# Patient Record
Sex: MALE | Race: WHITE | Employment: FULL TIME | URBAN - METROPOLITAN AREA
[De-identification: names, ages, dates, MRNs, and addresses within clinical notes are randomized per-mention and may not be internally consistent; named-entity substitution may affect disease eponyms.]

---

## 2017-01-04 ENCOUNTER — CARE COORDINATION (OUTPATIENT)
Dept: INTERNAL MEDICINE | Age: 72
End: 2017-01-04

## 2017-02-03 RX ORDER — RAMIPRIL 5 MG/1
CAPSULE ORAL
Qty: 90 CAPSULE | Refills: 3 | Status: SHIPPED | OUTPATIENT
Start: 2017-02-03 | End: 2017-12-12 | Stop reason: SDUPTHER

## 2017-02-03 RX ORDER — ATORVASTATIN CALCIUM 80 MG/1
TABLET, FILM COATED ORAL
Qty: 90 TABLET | Refills: 3 | Status: SHIPPED | OUTPATIENT
Start: 2017-02-03 | End: 2017-12-12 | Stop reason: SDUPTHER

## 2017-02-28 ENCOUNTER — CARE COORDINATION (OUTPATIENT)
Dept: INTERNAL MEDICINE | Age: 72
End: 2017-02-28

## 2017-04-05 ENCOUNTER — CARE COORDINATION (OUTPATIENT)
Dept: INTERNAL MEDICINE | Age: 72
End: 2017-04-05

## 2017-05-30 ENCOUNTER — CARE COORDINATION (OUTPATIENT)
Dept: INTERNAL MEDICINE | Age: 72
End: 2017-05-30

## 2017-06-12 ENCOUNTER — OFFICE VISIT (OUTPATIENT)
Dept: INTERNAL MEDICINE | Age: 72
End: 2017-06-12

## 2017-06-12 VITALS
DIASTOLIC BLOOD PRESSURE: 70 MMHG | SYSTOLIC BLOOD PRESSURE: 130 MMHG | WEIGHT: 201 LBS | BODY MASS INDEX: 26.64 KG/M2 | HEIGHT: 73 IN

## 2017-06-12 DIAGNOSIS — K63.5 COLON POLYPS: ICD-10-CM

## 2017-06-12 DIAGNOSIS — Z12.5 PROSTATE CANCER SCREENING: ICD-10-CM

## 2017-06-12 DIAGNOSIS — I25.10 CORONARY ARTERY DISEASE INVOLVING NATIVE CORONARY ARTERY OF NATIVE HEART WITHOUT ANGINA PECTORIS: ICD-10-CM

## 2017-06-12 DIAGNOSIS — I10 ESSENTIAL HYPERTENSION, BENIGN: Primary | ICD-10-CM

## 2017-06-12 DIAGNOSIS — L57.0 ACTINIC KERATOSIS: ICD-10-CM

## 2017-06-12 DIAGNOSIS — E78.2 MIXED HYPERLIPIDEMIA: ICD-10-CM

## 2017-06-12 DIAGNOSIS — R73.9 HYPERGLYCEMIA: ICD-10-CM

## 2017-06-12 PROCEDURE — 99214 OFFICE O/P EST MOD 30 MIN: CPT | Performed by: HOSPITALIST

## 2017-06-14 LAB
A/G RATIO: 1.6 (ref 1.1–2.2)
ALBUMIN SERPL-MCNC: 4.2 G/DL (ref 3.4–5)
ALP BLD-CCNC: 102 U/L (ref 40–129)
ALT SERPL-CCNC: 29 U/L (ref 10–40)
ANION GAP SERPL CALCULATED.3IONS-SCNC: 10 MMOL/L (ref 3–16)
AST SERPL-CCNC: 20 U/L (ref 15–37)
BASOPHILS ABSOLUTE: 0 K/UL (ref 0–0.2)
BASOPHILS RELATIVE PERCENT: 0.4 %
BILIRUB SERPL-MCNC: 0.5 MG/DL (ref 0–1)
BUN BLDV-MCNC: 15 MG/DL (ref 7–20)
CALCIUM SERPL-MCNC: 9.7 MG/DL (ref 8.3–10.6)
CHLORIDE BLD-SCNC: 99 MMOL/L (ref 99–110)
CHOLESTEROL, TOTAL: 217 MG/DL (ref 0–199)
CO2: 30 MMOL/L (ref 21–32)
CREAT SERPL-MCNC: 0.7 MG/DL (ref 0.8–1.3)
EOSINOPHILS ABSOLUTE: 0 K/UL (ref 0–0.6)
EOSINOPHILS RELATIVE PERCENT: 0.1 %
GFR AFRICAN AMERICAN: >60
GFR NON-AFRICAN AMERICAN: >60
GLOBULIN: 2.7 G/DL
GLUCOSE BLD-MCNC: 165 MG/DL (ref 70–99)
HCT VFR BLD CALC: 43.1 % (ref 40.5–52.5)
HDLC SERPL-MCNC: 46 MG/DL (ref 40–60)
HEMOGLOBIN: 14 G/DL (ref 13.5–17.5)
LDL CHOLESTEROL CALCULATED: ABNORMAL MG/DL
LDL CHOLESTEROL DIRECT: 111 MG/DL
LYMPHOCYTES ABSOLUTE: 2.2 K/UL (ref 1–5.1)
LYMPHOCYTES RELATIVE PERCENT: 34.7 %
MCH RBC QN AUTO: 30.2 PG (ref 26–34)
MCHC RBC AUTO-ENTMCNC: 32.4 G/DL (ref 31–36)
MCV RBC AUTO: 93.1 FL (ref 80–100)
MONOCYTES ABSOLUTE: 0.5 K/UL (ref 0–1.3)
MONOCYTES RELATIVE PERCENT: 7.1 %
NEUTROPHILS ABSOLUTE: 3.7 K/UL (ref 1.7–7.7)
NEUTROPHILS RELATIVE PERCENT: 57.7 %
PDW BLD-RTO: 14.1 % (ref 12.4–15.4)
PLATELET # BLD: 273 K/UL (ref 135–450)
PMV BLD AUTO: 9.2 FL (ref 5–10.5)
POTASSIUM SERPL-SCNC: 5.5 MMOL/L (ref 3.5–5.1)
PROSTATE SPECIFIC ANTIGEN: 0.65 NG/ML (ref 0–4)
RBC # BLD: 4.63 M/UL (ref 4.2–5.9)
SODIUM BLD-SCNC: 139 MMOL/L (ref 136–145)
TOTAL PROTEIN: 6.9 G/DL (ref 6.4–8.2)
TRIGL SERPL-MCNC: 377 MG/DL (ref 0–150)
VLDLC SERPL CALC-MCNC: ABNORMAL MG/DL
WBC # BLD: 6.3 K/UL (ref 4–11)

## 2017-06-15 LAB
ESTIMATED AVERAGE GLUCOSE: 191.5 MG/DL
HBA1C MFR BLD: 8.3 %

## 2017-06-18 ENCOUNTER — TELEPHONE (OUTPATIENT)
Dept: INTERNAL MEDICINE | Age: 72
End: 2017-06-18

## 2017-06-18 DIAGNOSIS — E11.9 TYPE 2 DIABETES MELLITUS WITHOUT COMPLICATION, WITHOUT LONG-TERM CURRENT USE OF INSULIN (HCC): Primary | ICD-10-CM

## 2017-06-19 ENCOUNTER — CARE COORDINATION (OUTPATIENT)
Dept: INTERNAL MEDICINE | Age: 72
End: 2017-06-19

## 2017-06-26 ENCOUNTER — CARE COORDINATION (OUTPATIENT)
Dept: INTERNAL MEDICINE | Age: 72
End: 2017-06-26

## 2017-06-28 ENCOUNTER — OFFICE VISIT (OUTPATIENT)
Dept: CARDIOLOGY CLINIC | Age: 72
End: 2017-06-28

## 2017-06-28 VITALS
HEART RATE: 84 BPM | WEIGHT: 195.4 LBS | BODY MASS INDEX: 25.78 KG/M2 | DIASTOLIC BLOOD PRESSURE: 60 MMHG | SYSTOLIC BLOOD PRESSURE: 140 MMHG

## 2017-06-28 DIAGNOSIS — I25.10 CAD IN NATIVE ARTERY: Primary | ICD-10-CM

## 2017-06-28 DIAGNOSIS — I25.2 MI, OLD: ICD-10-CM

## 2017-06-28 DIAGNOSIS — I10 ESSENTIAL HYPERTENSION: ICD-10-CM

## 2017-06-28 PROCEDURE — 99214 OFFICE O/P EST MOD 30 MIN: CPT | Performed by: INTERNAL MEDICINE

## 2017-07-20 ENCOUNTER — CARE COORDINATION (OUTPATIENT)
Dept: INTERNAL MEDICINE | Age: 72
End: 2017-07-20

## 2017-07-20 ENCOUNTER — CARE COORDINATOR VISIT (OUTPATIENT)
Dept: CARE COORDINATION | Age: 72
End: 2017-07-20

## 2017-08-21 ENCOUNTER — CARE COORDINATION (OUTPATIENT)
Dept: INTERNAL MEDICINE | Age: 72
End: 2017-08-21

## 2017-09-20 ENCOUNTER — OFFICE VISIT (OUTPATIENT)
Dept: DERMATOLOGY | Age: 72
End: 2017-09-20

## 2017-09-20 DIAGNOSIS — L82.1 SK (SEBORRHEIC KERATOSIS): Primary | ICD-10-CM

## 2017-09-20 DIAGNOSIS — Z86.006 HISTORY OF MELANOMA IN SITU: ICD-10-CM

## 2017-09-20 DIAGNOSIS — L57.0 AK (ACTINIC KERATOSIS): ICD-10-CM

## 2017-09-20 PROCEDURE — 99213 OFFICE O/P EST LOW 20 MIN: CPT | Performed by: DERMATOLOGY

## 2017-09-29 ENCOUNTER — CARE COORDINATION (OUTPATIENT)
Dept: INTERNAL MEDICINE | Age: 72
End: 2017-09-29

## 2017-10-06 NOTE — PATIENT INSTRUCTIONS
Anxiety Disorder: Care Instructions  Your Care Instructions  Anxiety is a normal reaction to stress. Difficult situations can cause you to have symptoms such as sweaty palms and a nervous feeling. In an anxiety disorder, the symptoms are far more severe. Constant worry, muscle tension, trouble sleeping, nausea and diarrhea, and other symptoms can make normal daily activities difficult or impossible. These symptoms may occur for no reason, and they can affect your work, school, or social life. Medicines, counseling, and self-care can all help. Follow-up care is a key part of your treatment and safety. Be sure to make and go to all appointments, and call your doctor if you are having problems. It's also a good idea to know your test results and keep a list of the medicines you take. How can you care for yourself at home? · Take medicines exactly as directed. Call your doctor if you think you are having a problem with your medicine. · Go to your counseling sessions and follow-up appointments. · Recognize and accept your anxiety. Then, when you are in a situation that makes you anxious, say to yourself, \"This is not an emergency. I feel uncomfortable, but I am not in danger. I can keep going even if I feel anxious. \"  · Be kind to your body:  ¨ Relieve tension with exercise or a massage. ¨ Get enough rest.  ¨ Avoid alcohol, caffeine, nicotine, and illegal drugs. They can increase your anxiety level and cause sleep problems. ¨ Learn and do relaxation techniques. See below for more about these techniques. · Engage your mind. Get out and do something you enjoy. Go to a funny movie, or take a walk or hike. Plan your day. Having too much or too little to do can make you anxious. · Keep a record of your symptoms. Discuss your fears with a good friend or family member, or join a support group for people with similar problems. Talking to others sometimes relieves stress.   · Get involved in social groups, or tape while you drive a car. When should you call for help? Call 911 anytime you think you may need emergency care. For example, call if:  · You feel you cannot stop from hurting yourself or someone else. Keep the numbers for these national suicide hotlines: 5-850-450-TALK (3-520.687.1339) and 4-425-BDXGONZ (0-115.639.1413). If you or someone you know talks about suicide or feeling hopeless, get help right away. Watch closely for changes in your health, and be sure to contact your doctor if:  · You have anxiety or fear that affects your life. · You have symptoms of anxiety that are new or different from those you had before. Where can you learn more? Go to https://[email or web address].ChosenList.com. org and sign in to your [employer] account. Enter P754 in the EDITD box to learn more about \"Anxiety Disorder: Care Instructions. \"     If you do not have an account, please click on the \"Sign Up Now\" link. Current as of: July 26, 2016  Content Version: 11.3  © 2409-1578 [employer]. Care instructions adapted under license by [facility] ([facility]). If you have questions about a medical condition or this instruction, always ask your healthcare professional. [address] any warranty or liability for your use of this information.

## 2017-10-06 NOTE — CARE COORDINATION
be more proactive with preventive check ups  Patient is able to discuss self-management of condition(s):  Pt demonstrates adherence to medications  Pt demonstrates understanding of self-monitoring  Patient is able to identify Red Flags:  Alert to potential adverse drug reactions(s) or side effects and actions to take should they arise  Discuss target symptoms and actions to take should they arise  Identify problems that require immediate PCP or specialist visit  Patient demonstrates understanding of access to PCP/Specialist:  Understands about scheduling routine Follow Up appointments   Understands about sick day appointment options for worsening of symptoms/progression (Same Day, E Visits)       Exercise 3x per week (30 min per time)   On track (9/29/2017)             Care Coordination Goal:  Exercise  Goal: Type: walking     Barriers:  lack of motivation  Plan for overcoming my barriers: Pt stated he has just started walking. Confidence: 7/10       Nutrition Plan   On track (9/29/2017)             I will learn more about a Diabetic Diet and carb counting. Barriers: stress and lack of education  Plan for overcoming my barriers: Diabeitc Ed with the [address] and Dietitian. Confidence: 8/10  Anticipated Goal Completion Date: 09/19/17            Prior to Admission medications    Medication Sig Start Date End Date Taking? Authorizing Provider   glucose blood VI test strips (ASCENSIA AUTODISC VI;ONE TOUCH ULTRA TEST VI) strip 1 each by In Vitro route daily As needed.  7/26/17   Adrianna Alexandre MD   metFORMIN (GLUCOPHAGE) 500 MG tablet Take 1 tablet by mouth 2 times daily (with meals) 6/18/17   Adrianna Alexandre MD   atorvastatin (LIPITOR) 80 MG tablet TAKE 1 TABLET DAILY 2/3/17   Margarito Pettit MD   metoprolol tartrate (LOPRESSOR) 25 MG tablet TAKE ONE-HALF TABLET BY MOUTH TWICE DAILY 2/3/17   Margarito Pettit MD   ramipril (ALTACE) 5 MG capsule TAKE 1 CAPSULE BY MOUTH DAILY 2/3/17   Margarito Pettit MD   nitroGLYCERIN

## 2017-10-14 DIAGNOSIS — E11.9 TYPE 2 DIABETES MELLITUS WITHOUT COMPLICATION, WITHOUT LONG-TERM CURRENT USE OF INSULIN (HCC): ICD-10-CM

## 2017-11-22 ENCOUNTER — CARE COORDINATION (OUTPATIENT)
Dept: CARE COORDINATION | Age: 72
End: 2017-11-22

## 2017-12-01 NOTE — CARE COORDINATION
Phone Note:  The pt's wife stated the pt was out of town. The wife stated she would have the pt call the nurse back next week. RNCC called to check on the pt's BS's.
(What steps will patient take to achieve goal?): Pt will be more proactive with preventive check ups  Patient is able to discuss self-management of condition(s):  Pt demonstrates adherence to medications  Pt demonstrates understanding of self-monitoring  Patient is able to identify Red Flags:  Alert to potential adverse drug reactions(s) or side effects and actions to take should they arise  Discuss target symptoms and actions to take should they arise  Identify problems that require immediate PCP or specialist visit  Patient demonstrates understanding of access to PCP/Specialist:  Understands about scheduling routine Follow Up appointments   Understands about sick day appointment options for worsening of symptoms/progression (Same Day, E Visits)       Exercise 3x per week (30 min per time)   On track (11/22/2017)             Care Coordination Goal:  Exercise  Goal: Type: walking     Barriers:  lack of motivation  Plan for overcoming my barriers: Pt stated he has just started walking. Confidence: 7/10       Nutrition Plan   On track (11/22/2017)             I will learn more about a Diabetic Diet and carb counting. Barriers: stress and lack of education  Plan for overcoming my barriers: Diabeitc Ed with the [facility] and Dietitian. Confidence: 8/10  Anticipated Goal Completion Date: 09/19/17            Prior to Admission medications    Medication Sig Start Date End Date Taking? Authorizing Provider   metFORMIN (GLUCOPHAGE) 500 MG tablet TAKE 1 TABLET BY MOUTH 2 TIMES DAILY (WITH MEALS) 10/16/17   Mirza Campuzano MD   glucose blood VI test strips (ASCENSIA AUTODISC VI;ONE TOUCH ULTRA TEST VI) strip 1 each by In Vitro route daily As needed.  7/26/17   Mirza Campuzano MD   atorvastatin (LIPITOR) 80 MG tablet TAKE 1 TABLET DAILY 2/3/17   Edson Urbano MD   metoprolol tartrate (LOPRESSOR) 25 MG tablet TAKE ONE-HALF TABLET BY MOUTH TWICE DAILY 2/3/17   Edson Urbano MD   ramipril (ALTACE) 5 MG capsule TAKE 1 CAPSULE BY

## 2017-12-14 RX ORDER — RAMIPRIL 5 MG/1
CAPSULE ORAL
Qty: 90 CAPSULE | Refills: 3 | Status: SHIPPED | OUTPATIENT
Start: 2017-12-14 | End: 2019-01-03 | Stop reason: SDUPTHER

## 2017-12-14 RX ORDER — ATORVASTATIN CALCIUM 80 MG/1
TABLET, FILM COATED ORAL
Qty: 90 TABLET | Refills: 3 | Status: SHIPPED | OUTPATIENT
Start: 2017-12-14 | End: 2019-01-03 | Stop reason: SDUPTHER

## 2017-12-20 ENCOUNTER — OFFICE VISIT (OUTPATIENT)
Dept: CARDIOLOGY CLINIC | Age: 72
End: 2017-12-20

## 2017-12-20 VITALS
HEART RATE: 72 BPM | DIASTOLIC BLOOD PRESSURE: 70 MMHG | SYSTOLIC BLOOD PRESSURE: 150 MMHG | BODY MASS INDEX: 25.07 KG/M2 | WEIGHT: 190 LBS

## 2017-12-20 DIAGNOSIS — I10 ESSENTIAL HYPERTENSION: ICD-10-CM

## 2017-12-20 DIAGNOSIS — I25.10 CAD IN NATIVE ARTERY: Primary | ICD-10-CM

## 2017-12-20 DIAGNOSIS — I25.2 MI, OLD: ICD-10-CM

## 2017-12-20 PROCEDURE — G8484 FLU IMMUNIZE NO ADMIN: HCPCS | Performed by: INTERNAL MEDICINE

## 2017-12-20 PROCEDURE — 3017F COLORECTAL CA SCREEN DOC REV: CPT | Performed by: INTERNAL MEDICINE

## 2017-12-20 PROCEDURE — 1123F ACP DISCUSS/DSCN MKR DOCD: CPT | Performed by: INTERNAL MEDICINE

## 2017-12-20 PROCEDURE — 4040F PNEUMOC VAC/ADMIN/RCVD: CPT | Performed by: INTERNAL MEDICINE

## 2017-12-20 PROCEDURE — 99214 OFFICE O/P EST MOD 30 MIN: CPT | Performed by: INTERNAL MEDICINE

## 2017-12-20 PROCEDURE — 1036F TOBACCO NON-USER: CPT | Performed by: INTERNAL MEDICINE

## 2017-12-20 PROCEDURE — G8417 CALC BMI ABV UP PARAM F/U: HCPCS | Performed by: INTERNAL MEDICINE

## 2017-12-20 PROCEDURE — G8598 ASA/ANTIPLAT THER USED: HCPCS | Performed by: INTERNAL MEDICINE

## 2017-12-20 PROCEDURE — G8427 DOCREV CUR MEDS BY ELIG CLIN: HCPCS | Performed by: INTERNAL MEDICINE

## 2017-12-20 NOTE — PROGRESS NOTES
Subjective:      Patient ID: Corky Rogers is a 67 y.o. male. HPI Renato Mitchell is here today for 6 month follow up CAD/HTN/MI. Continues to walk 3mi for 3-4x per wk. No angina. No sob. No pnd or orthopnea. No edema. Wt down. Feeling good. Past Medical History:   Diagnosis Date    CAD (coronary artery disease)     Hyperlipidemia     Hypertension     Keratosis, seborrheic     Lower GI bleed     Myocardial infarction     Sensorineural hearing loss of right ear     Shingles      Past Surgical History:   Procedure Laterality Date    CORONARY ANGIOPLASTY WITH STENT PLACEMENT      UPPER GASTROINTESTINAL ENDOSCOPY      received clipping for ania rice tear       Social History     Social History    Marital status:      Spouse name: N/A    Number of children: 2    Years of education: N/A     Occupational History          retired     Social History Main Topics    Smoking status: Never Smoker    Smokeless tobacco: Former User    Alcohol use No    Drug use: No    Sexual activity: Not on file     Other Topics Concern    Not on file     Social History Narrative    No narrative on file     FH reviewed    Review of Systems   Constitutional: Negative for activity change and fatigue. Respiratory: Negative for apnea, cough, choking, chest tightness and shortness of breath. Cardiovascular: Negative for chest pain, palpitations and leg swelling. No PND or orthopnea. No tachycardia. Gastrointestinal: Negative for abdominal distention. Musculoskeletal: Negative for myalgias. Neurological: Negative for dizziness and syncope. Psychiatric/Behavioral: Negative for behavioral problems, confusion and agitation. Other systems reviewed negative as done      Vitals:    12/20/17 1415   BP: (!) 150/70   Pulse:          Objective:   Physical Exam   Constitutional: He is oriented to person, place, and time. He appears well-developed and well-nourished. No distress.    HENT: Head: Normocephalic and atraumatic. Eyes: Conjunctivae and EOM are normal. Right eye exhibits no discharge. Left eye exhibits no discharge. Neck: Normal range of motion. Neck supple. No JVD present. Cardiovascular: Normal rate, regular rhythm, S1 normal and S2 normal.  Exam reveals no gallop. No murmur heard. Pulses:       Radial pulses are 2+ on the right side, and 2+ on the left side. Pulmonary/Chest: Breath sounds normal. No respiratory distress. He has no wheezes. He has no rales. Abdominal: Soft. Bowel sounds are normal. No tenderness. Musculoskeletal: Normal range of motion. He exhibits no edema. Neurological: He is alert and oriented to person, place, and time. Skin: Skin is warm and dry. Psychiatric: He has a normal mood and affect. His behavior is normal. Thought content normal.       Assessment:      1. CAD in native artery     2. Essential hypertension     3. MI, old               Plan:      CV stable. No chest pain. Continues to exercise. BP is good at home 110-130/70s. Follows bp at home. Reviewed previous cardiac records and testing including myoview 11/14. Continue to monitor. Follow up 6 months. Lipids per PCP. Stress myoview recommended but wishes to put off again.

## 2017-12-27 ENCOUNTER — CARE COORDINATION (OUTPATIENT)
Dept: CARE COORDINATION | Age: 72
End: 2017-12-27

## 2018-01-29 ENCOUNTER — CARE COORDINATION (OUTPATIENT)
Dept: CARE COORDINATION | Age: 73
End: 2018-01-29

## 2018-01-30 NOTE — CARE COORDINATION
Ambulatory Care Coordination Note  1/29/2018  CM Risk Score: 2  Precious Mortality Risk Score: 3.69    ACC: Madeline Breaux RN     Hx: Hyperlipidemia, HTN, CAD, MI, Resp Failure, GI Bleed with Hemorrhagic Shock, Hyperglycemia       Summary Note: The pt stated he has been feeling good. The pt denied any chest pain, SOB or palpitations. The pt stated he walks 2.5 miles daily. The pt stated he did not get a Flu vaccine this year. The pt stated the last time he got a Flu vaccine he felt bad for weeks. The pt stated he had fatigue, dizziness and palpitations. The [facility] discuss the experience further with the pt and the pt remember the fatigue, dizzines and palpitations started before he had his GI bleed. The pt stated he realizes now the GI bleed had caused his symptoms. The pt stated he was taken off Plavix and his Aspirin was decreased to 81 mg at that time. Plan:  The [facility] encouraged the pt to consider a Flu vaccine. The pt stated he will be sure to get a Flu vaccine for next year. RNCC reviewed the signs and symptoms of Flu, when to report the symptoms and the treatment. Handout mailed to the pt: Influenza (Flu): Care Instructions      Care Coordination Interventions    Program Enrollment:  Rising Risk  Referral from Primary Care Provider:  Yes  Suggested Interventions and Community Resources  Diabetes Education: In Process  Fall Risk Prevention: In Process  Life Skills Coaching: In Process  Zone Management Tools:   In Process  Other Services or Interventions:  Influenza education         Goals Addressed             Most Recent     Care Coordination Self Management   On track (1/29/2018)             CC Self Management Goal: Monitor and Maintain his own health  Patient Goal (What steps will patient take to achieve goal?): Pt will be more proactive with preventive check ups  Patient is able to discuss self-management of condition(s):  Pt demonstrates adherence to medications  Pt demonstrates understanding of self-monitoring  Patient is able to identify Red Flags:  Alert to potential adverse drug reactions(s) or side effects and actions to take should they arise  Discuss target symptoms and actions to take should they arise  Identify problems that require immediate PCP or specialist visit  Patient demonstrates understanding of access to PCP/Specialist:  Understands about scheduling routine Follow Up appointments   Understands about sick day appointment options for worsening of symptoms/progression (Same Day, E Visits)       Exercise 3x per week (30 min per time)   On track (1/29/2018)             Care Coordination Goal:  Exercise  Goal: Type: walking     Barriers:  lack of motivation  Plan for overcoming my barriers: Pt stated he has just started walking. Confidence: 7/10            Prior to Admission medications    Medication Sig Start Date End Date Taking? Authorizing Provider   ramipril (ALTACE) 5 MG capsule TAKE 1 CAPSULE EVERY DAY 12/14/17   Dionna Mathur MD   atorvastatin (LIPITOR) 80 MG tablet TAKE 1 TABLET EVERY DAY 12/14/17   Dionna Mathur MD   metoprolol tartrate (LOPRESSOR) 25 MG tablet TAKE 1/2 TABLET TWICE DAILY 12/14/17   Dionna Mathur MD   metFORMIN (GLUCOPHAGE) 500 MG tablet TAKE 1 TABLET BY MOUTH 2 TIMES DAILY (WITH MEALS) 10/16/17   Hollie Boswell MD   glucose blood VI test strips (ASCENSIA AUTODISC VI;ONE TOUCH ULTRA TEST VI) strip 1 each by In Vitro route daily As needed.  7/26/17   Hollie Boswell MD   nitroGLYCERIN (NITROSTAT) 0.4 MG SL tablet Place 1 tablet under the tongue every 5 minutes as needed (PRN) 12/21/16   Dionna Mathur MD   esomeprazole (NEXIUM) 20 MG capsule Take 20 mg by mouth every morning (before breakfast)    Historical Provider, MD   aspirin 81 MG EC tablet 81 mg Take 2 tablets daily    Historical Provider, MD       Future Appointments  Date Time Provider Monisha Yeung   6/20/2018 2:00 PM MD Sebastien Paniagua   9/26/2018 9:15 AM MD Doreen Del Real

## 2018-02-07 DIAGNOSIS — E11.9 TYPE 2 DIABETES MELLITUS WITHOUT COMPLICATION, WITHOUT LONG-TERM CURRENT USE OF INSULIN (HCC): ICD-10-CM

## 2018-03-09 ENCOUNTER — CARE COORDINATION (OUTPATIENT)
Dept: CARE COORDINATION | Age: 73
End: 2018-03-09

## 2018-03-09 NOTE — CARE COORDINATION
Ambulatory Care Coordination Note  3/9/2018  CM Risk Score: 2  Precious Mortality Risk Score: 3.69    ACC: Allie Carver RN     Hx: Hyperlipidemia, HTN, CAD, MI, Resp Failure, GI Bleed with Hemorrhagic Shock, Hyperglycemia    Summary Note: The pt denied any chest pain, SOB or swelling in his lower extremities. The pt stated he does not check his blood sugars every day but at least every 2-3 days. The pt stated his FBS's have been running 106-126. The pt stated he goes for a walk 3 times a week and walks on his treadmill twice a week. The [address] informed the pt that his last A1C was in 06/2017 before he started on the Metformin. The pt stated his B/P has been running 120-126/60 and his HR 58. Plan:  RNCC reviewed glucose monitoring and the importance of checking daily. The [address] informed the pt that the PCP likes to follow up with the pt every 3-4 months. The [address] informed the pt he needed to have his A1C checked. The [address] explained that the A1C would let the PCP know if the Metformin is working well. The pt will make an appointment. The [address] will watch to make sure the pt follows up with the PCP. Care Coordination Interventions    Program Enrollment:  Rising Risk  Referral from Primary Care Provider:  Yes  Suggested Interventions and Community Resources  Diabetes Education: In Process  Fall Risk Prevention: In Process  Life Skills Coaching: In Process  Zone Management Tools:   In Process  Other Services or Interventions:  Influenza education         Goals Addressed             Most Recent     Care Coordination Self Management   Improving (3/9/2018)             CC Self Management Goal: Monitor and Maintain his own health  Patient Goal (What steps will patient take to achieve goal?): Pt will be more proactive with preventive check ups  Patient is able to discuss self-management of condition(s):  Pt demonstrates adherence to medications  Pt demonstrates understanding of self-monitoring  Patient is Provider, MD   aspirin 81 MG EC tablet 81 mg Take 2 tablets daily    Historical Provider, MD       Future Appointments  Date Time Provider Monisha Cheni   6/20/2018 2:00 PM MD Sebastien Sandra   9/26/2018 9:15 AM MD Adenike Sue     ,   Diabetes Assessment    Medic Alert ID:  No  Meal Planning:  None   How often do you test your blood sugar?:  Daily   Do you have barriers with adherence to non-pharmacologic self-management interventions?  (Nutrition/Exercise/Self-Monitoring):  No   Have you ever had to go to the ED for symptoms of low blood sugar?:  No       No patient-reported symptoms       and   General Assessment    Do you have any symptoms that are causing concern?:  No

## 2018-04-26 ENCOUNTER — OFFICE VISIT (OUTPATIENT)
Dept: INTERNAL MEDICINE | Age: 73
End: 2018-04-26

## 2018-04-26 VITALS
HEIGHT: 73 IN | BODY MASS INDEX: 26.11 KG/M2 | SYSTOLIC BLOOD PRESSURE: 118 MMHG | WEIGHT: 197 LBS | DIASTOLIC BLOOD PRESSURE: 70 MMHG

## 2018-04-26 DIAGNOSIS — Z12.5 PROSTATE CANCER SCREENING: ICD-10-CM

## 2018-04-26 DIAGNOSIS — I10 ESSENTIAL HYPERTENSION, BENIGN: ICD-10-CM

## 2018-04-26 DIAGNOSIS — Z23 NEED FOR PROPHYLACTIC VACCINATION AGAINST STREPTOCOCCUS PNEUMONIAE (PNEUMOCOCCUS): ICD-10-CM

## 2018-04-26 DIAGNOSIS — Z12.11 COLON CANCER SCREENING: ICD-10-CM

## 2018-04-26 DIAGNOSIS — Z23 NEED FOR PROPHYLACTIC VACCINATION AND INOCULATION AGAINST VARICELLA: ICD-10-CM

## 2018-04-26 DIAGNOSIS — E78.2 MIXED HYPERLIPIDEMIA: ICD-10-CM

## 2018-04-26 DIAGNOSIS — Z00.00 ROUTINE GENERAL MEDICAL EXAMINATION AT A HEALTH CARE FACILITY: Primary | ICD-10-CM

## 2018-04-26 DIAGNOSIS — E11.9 DIABETES MELLITUS TYPE 2 IN NONOBESE (HCC): ICD-10-CM

## 2018-04-26 PROCEDURE — 4040F PNEUMOC VAC/ADMIN/RCVD: CPT | Performed by: HOSPITALIST

## 2018-04-26 PROCEDURE — 1123F ACP DISCUSS/DSCN MKR DOCD: CPT | Performed by: HOSPITALIST

## 2018-04-26 PROCEDURE — 3017F COLORECTAL CA SCREEN DOC REV: CPT | Performed by: HOSPITALIST

## 2018-04-26 PROCEDURE — G0009 ADMIN PNEUMOCOCCAL VACCINE: HCPCS | Performed by: HOSPITALIST

## 2018-04-26 PROCEDURE — G0438 PPPS, INITIAL VISIT: HCPCS | Performed by: HOSPITALIST

## 2018-04-26 PROCEDURE — G8598 ASA/ANTIPLAT THER USED: HCPCS | Performed by: HOSPITALIST

## 2018-04-26 PROCEDURE — 99214 OFFICE O/P EST MOD 30 MIN: CPT | Performed by: HOSPITALIST

## 2018-04-26 PROCEDURE — 1036F TOBACCO NON-USER: CPT | Performed by: HOSPITALIST

## 2018-04-26 PROCEDURE — 2022F DILAT RTA XM EVC RTNOPTHY: CPT | Performed by: HOSPITALIST

## 2018-04-26 PROCEDURE — G8427 DOCREV CUR MEDS BY ELIG CLIN: HCPCS | Performed by: HOSPITALIST

## 2018-04-26 PROCEDURE — 3046F HEMOGLOBIN A1C LEVEL >9.0%: CPT | Performed by: HOSPITALIST

## 2018-04-26 PROCEDURE — G8417 CALC BMI ABV UP PARAM F/U: HCPCS | Performed by: HOSPITALIST

## 2018-04-26 PROCEDURE — 90732 PPSV23 VACC 2 YRS+ SUBQ/IM: CPT | Performed by: HOSPITALIST

## 2018-04-26 ASSESSMENT — ANXIETY QUESTIONNAIRES: GAD7 TOTAL SCORE: 0

## 2018-04-26 ASSESSMENT — PATIENT HEALTH QUESTIONNAIRE - PHQ9: SUM OF ALL RESPONSES TO PHQ QUESTIONS 1-9: 0

## 2018-04-27 ENCOUNTER — CARE COORDINATION (OUTPATIENT)
Dept: CARE COORDINATION | Age: 73
End: 2018-04-27

## 2018-04-27 DIAGNOSIS — I10 ESSENTIAL HYPERTENSION, BENIGN: ICD-10-CM

## 2018-04-27 DIAGNOSIS — Z12.5 PROSTATE CANCER SCREENING: ICD-10-CM

## 2018-04-27 DIAGNOSIS — Z00.00 ROUTINE GENERAL MEDICAL EXAMINATION AT A HEALTH CARE FACILITY: ICD-10-CM

## 2018-04-27 DIAGNOSIS — E11.9 DIABETES MELLITUS TYPE 2 IN NONOBESE (HCC): ICD-10-CM

## 2018-04-27 LAB
A/G RATIO: 2 (ref 1.1–2.2)
ALBUMIN SERPL-MCNC: 4.3 G/DL (ref 3.4–5)
ALP BLD-CCNC: 93 U/L (ref 40–129)
ALT SERPL-CCNC: 32 U/L (ref 10–40)
ANION GAP SERPL CALCULATED.3IONS-SCNC: 12 MMOL/L (ref 3–16)
AST SERPL-CCNC: 20 U/L (ref 15–37)
BASOPHILS ABSOLUTE: 0 K/UL (ref 0–0.2)
BASOPHILS RELATIVE PERCENT: 0.2 %
BILIRUB SERPL-MCNC: 0.5 MG/DL (ref 0–1)
BUN BLDV-MCNC: 17 MG/DL (ref 7–20)
CALCIUM SERPL-MCNC: 9.3 MG/DL (ref 8.3–10.6)
CHLORIDE BLD-SCNC: 99 MMOL/L (ref 99–110)
CHOLESTEROL, TOTAL: 167 MG/DL (ref 0–199)
CO2: 30 MMOL/L (ref 21–32)
CREAT SERPL-MCNC: 0.7 MG/DL (ref 0.8–1.3)
CREATININE URINE: 60.6 MG/DL (ref 39–259)
EOSINOPHILS ABSOLUTE: 0 K/UL (ref 0–0.6)
EOSINOPHILS RELATIVE PERCENT: 0 %
GFR AFRICAN AMERICAN: >60
GFR NON-AFRICAN AMERICAN: >60
GLOBULIN: 2.1 G/DL
GLUCOSE BLD-MCNC: 132 MG/DL (ref 70–99)
HCT VFR BLD CALC: 42.1 % (ref 40.5–52.5)
HDLC SERPL-MCNC: 50 MG/DL (ref 40–60)
HEMOGLOBIN: 14.1 G/DL (ref 13.5–17.5)
LDL CHOLESTEROL CALCULATED: 82 MG/DL
LYMPHOCYTES ABSOLUTE: 2.6 K/UL (ref 1–5.1)
LYMPHOCYTES RELATIVE PERCENT: 25.4 %
MCH RBC QN AUTO: 31.6 PG (ref 26–34)
MCHC RBC AUTO-ENTMCNC: 33.4 G/DL (ref 31–36)
MCV RBC AUTO: 94.4 FL (ref 80–100)
MICROALBUMIN UR-MCNC: <1.2 MG/DL
MICROALBUMIN/CREAT UR-RTO: NORMAL MG/G (ref 0–30)
MONOCYTES ABSOLUTE: 0.8 K/UL (ref 0–1.3)
MONOCYTES RELATIVE PERCENT: 8.2 %
NEUTROPHILS ABSOLUTE: 6.7 K/UL (ref 1.7–7.7)
NEUTROPHILS RELATIVE PERCENT: 66.2 %
PDW BLD-RTO: 13.9 % (ref 12.4–15.4)
PLATELET # BLD: 257 K/UL (ref 135–450)
PMV BLD AUTO: 8.7 FL (ref 5–10.5)
POTASSIUM SERPL-SCNC: 4.6 MMOL/L (ref 3.5–5.1)
PROSTATE SPECIFIC ANTIGEN: 0.55 NG/ML (ref 0–4)
RBC # BLD: 4.45 M/UL (ref 4.2–5.9)
SODIUM BLD-SCNC: 141 MMOL/L (ref 136–145)
TOTAL PROTEIN: 6.4 G/DL (ref 6.4–8.2)
TRIGL SERPL-MCNC: 177 MG/DL (ref 0–150)
VLDLC SERPL CALC-MCNC: 35 MG/DL
WBC # BLD: 10.1 K/UL (ref 4–11)

## 2018-04-28 LAB
ESTIMATED AVERAGE GLUCOSE: 142.7 MG/DL
HBA1C MFR BLD: 6.6 %

## 2018-06-04 DIAGNOSIS — E11.9 TYPE 2 DIABETES MELLITUS WITHOUT COMPLICATION, WITHOUT LONG-TERM CURRENT USE OF INSULIN (HCC): ICD-10-CM

## 2018-06-22 ENCOUNTER — CARE COORDINATION (OUTPATIENT)
Dept: CARE COORDINATION | Age: 73
End: 2018-06-22

## 2018-07-02 DIAGNOSIS — E11.9 TYPE 2 DIABETES MELLITUS WITHOUT COMPLICATION, WITHOUT LONG-TERM CURRENT USE OF INSULIN (HCC): ICD-10-CM

## 2018-07-25 ENCOUNTER — CARE COORDINATION (OUTPATIENT)
Dept: CARE COORDINATION | Age: 73
End: 2018-07-25

## 2018-08-06 ENCOUNTER — OFFICE VISIT (OUTPATIENT)
Dept: CARDIOLOGY CLINIC | Age: 73
End: 2018-08-06

## 2018-08-06 VITALS
BODY MASS INDEX: 25.86 KG/M2 | OXYGEN SATURATION: 95 % | SYSTOLIC BLOOD PRESSURE: 138 MMHG | DIASTOLIC BLOOD PRESSURE: 70 MMHG | HEART RATE: 77 BPM | WEIGHT: 196 LBS

## 2018-08-06 DIAGNOSIS — I25.10 CAD IN NATIVE ARTERY: Primary | ICD-10-CM

## 2018-08-06 DIAGNOSIS — I25.2 MI, OLD: ICD-10-CM

## 2018-08-06 DIAGNOSIS — I10 ESSENTIAL HYPERTENSION: ICD-10-CM

## 2018-08-06 PROCEDURE — 1036F TOBACCO NON-USER: CPT | Performed by: INTERNAL MEDICINE

## 2018-08-06 PROCEDURE — 4040F PNEUMOC VAC/ADMIN/RCVD: CPT | Performed by: INTERNAL MEDICINE

## 2018-08-06 PROCEDURE — G8417 CALC BMI ABV UP PARAM F/U: HCPCS | Performed by: INTERNAL MEDICINE

## 2018-08-06 PROCEDURE — G8598 ASA/ANTIPLAT THER USED: HCPCS | Performed by: INTERNAL MEDICINE

## 2018-08-06 PROCEDURE — 99214 OFFICE O/P EST MOD 30 MIN: CPT | Performed by: INTERNAL MEDICINE

## 2018-08-06 PROCEDURE — 3017F COLORECTAL CA SCREEN DOC REV: CPT | Performed by: INTERNAL MEDICINE

## 2018-08-06 PROCEDURE — 1123F ACP DISCUSS/DSCN MKR DOCD: CPT | Performed by: INTERNAL MEDICINE

## 2018-08-06 PROCEDURE — 1101F PT FALLS ASSESS-DOCD LE1/YR: CPT | Performed by: INTERNAL MEDICINE

## 2018-08-06 PROCEDURE — G8427 DOCREV CUR MEDS BY ELIG CLIN: HCPCS | Performed by: INTERNAL MEDICINE

## 2018-08-07 RX ORDER — NITROGLYCERIN 0.4 MG/1
0.4 TABLET SUBLINGUAL EVERY 5 MIN PRN
Qty: 25 TABLET | Refills: 5 | Status: SHIPPED | OUTPATIENT
Start: 2018-08-07 | End: 2021-12-29 | Stop reason: SDUPTHER

## 2018-08-09 RX ORDER — NITROGLYCERIN 0.4 MG/1
TABLET SUBLINGUAL
Qty: 25 TABLET | Refills: 2 | Status: SHIPPED | OUTPATIENT
Start: 2018-08-09 | End: 2020-09-28

## 2018-09-05 ENCOUNTER — CARE COORDINATION (OUTPATIENT)
Dept: CARE COORDINATION | Age: 73
End: 2018-09-05

## 2018-09-06 NOTE — CARE COORDINATION
of education  Plan for overcoming my barriers: Provide education and encouragement  Confidence: 7/10  Anticipated Goal Completion Date: 08/27/18         COMPLETED: Nutrition Plan   On track (7/25/2018)             I will learn more about a Diabetic Diet and carb counting. Barriers: stress and lack of education  Plan for overcoming my barriers: Diabeitc Ed with the [address] and Dietitian. Confidence: 8/10  Anticipated Goal Completion Date: 08/19/18            Prior to Admission medications    Medication Sig Start Date End Date Taking? Authorizing Provider   nitroGLYCERIN (NITROSTAT) 0.4 MG SL tablet PLACE 1 TABLET UNDER THE TONGUE EVERY 5 MINUTES AS NEEDED 8/9/18   Carlos A Andersen MD   nitroGLYCERIN (NITROSTAT) 0.4 MG SL tablet Place 1 tablet under the tongue every 5 minutes as needed (PRN) 8/7/18   Carlos A Andersen MD   metFORMIN (GLUCOPHAGE) 500 MG tablet Take 1 tablet by mouth 2 times daily (with meals) 7/2/18   Maida Ortiz MD   ramipril (ALTACE) 5 MG capsule TAKE 1 CAPSULE EVERY DAY 12/14/17   Carlos A Andersen MD   atorvastatin (LIPITOR) 80 MG tablet TAKE 1 TABLET EVERY DAY 12/14/17   Carlos A Andersen MD   metoprolol tartrate (LOPRESSOR) 25 MG tablet TAKE 1/2 TABLET TWICE DAILY 12/14/17   Carlos A Andersen MD   esomeprazole (NEXIUM) 20 MG capsule Take 20 mg by mouth every morning (before breakfast)    Historical Provider, MD   aspirin 81 MG EC tablet 81 mg Take 2 tablets daily    Historical Provider, MD       Future Appointments  Date Time Provider Monisha Yeung   9/26/2018 9:15 AM MD Kristina Lyon   2/11/2019 2:00 PM MD Sebastien Segovia [facility]     ,   Diabetes Assessment    Medic Alert ID:  No  Meal Planning:  None   How often do you test your blood sugar?:  Daily   Do you have barriers with adherence to non-pharmacologic self-management interventions?  (Nutrition/Exercise/Self-Monitoring):  No   Have you ever had to go to the ED for symptoms of low blood sugar?:  No           and   General Assessment    Do you have any symptoms that are causing concern?:  No

## 2018-09-25 DIAGNOSIS — E11.9 TYPE 2 DIABETES MELLITUS WITHOUT COMPLICATION, WITHOUT LONG-TERM CURRENT USE OF INSULIN (HCC): ICD-10-CM

## 2018-09-26 ENCOUNTER — OFFICE VISIT (OUTPATIENT)
Dept: DERMATOLOGY | Age: 73
End: 2018-09-26
Payer: MEDICARE

## 2018-09-26 DIAGNOSIS — B35.1 ONYCHOMYCOSIS: ICD-10-CM

## 2018-09-26 DIAGNOSIS — L82.1 SK (SEBORRHEIC KERATOSIS): Primary | ICD-10-CM

## 2018-09-26 DIAGNOSIS — Z86.006 HISTORY OF MELANOMA IN SITU: ICD-10-CM

## 2018-09-26 PROCEDURE — G8427 DOCREV CUR MEDS BY ELIG CLIN: HCPCS | Performed by: DERMATOLOGY

## 2018-09-26 PROCEDURE — 4040F PNEUMOC VAC/ADMIN/RCVD: CPT | Performed by: DERMATOLOGY

## 2018-09-26 PROCEDURE — 1101F PT FALLS ASSESS-DOCD LE1/YR: CPT | Performed by: DERMATOLOGY

## 2018-09-26 PROCEDURE — 99213 OFFICE O/P EST LOW 20 MIN: CPT | Performed by: DERMATOLOGY

## 2018-09-26 PROCEDURE — 3017F COLORECTAL CA SCREEN DOC REV: CPT | Performed by: DERMATOLOGY

## 2018-09-26 PROCEDURE — 1123F ACP DISCUSS/DSCN MKR DOCD: CPT | Performed by: DERMATOLOGY

## 2018-09-26 PROCEDURE — 1036F TOBACCO NON-USER: CPT | Performed by: DERMATOLOGY

## 2018-09-26 PROCEDURE — G8598 ASA/ANTIPLAT THER USED: HCPCS | Performed by: DERMATOLOGY

## 2018-09-26 PROCEDURE — G8417 CALC BMI ABV UP PARAM F/U: HCPCS | Performed by: DERMATOLOGY

## 2018-09-26 NOTE — PROGRESS NOTES
[facility]  Roberto Conklin MD  [phone]      Nathan German  1945    68 y.o. male     Date of Visit: 9/26/2018    Chief Complaint: skin lesions    History of Present Illness:    1. He presents today for multiple growths on the sides of the cheeks and also on the back. 2.  He also complains of chronic toenail dystrophynot bothersome. 3.  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 He denies any signs of recurrence. Review of Systems:  Skin: No new or changing moles. Past Medical History, Family History, Surgical History, Medications and Allergies reviewed.     Past Medical History:   Diagnosis Date    CAD (coronary artery disease)     Hyperlipidemia     Hypertension     Keratosis, seborrheic     Lower GI bleed     Myocardial infarction (HCC)     Sensorineural hearing loss of right ear     Shingles      Past Surgical History:   Procedure Laterality Date    CORONARY ANGIOPLASTY WITH STENT PLACEMENT      UPPER GASTROINTESTINAL ENDOSCOPY      received clipping for ania rice tear       Allergies   Allergen Reactions    Codeine     Sulfa Antibiotics Nausea And Vomiting     Outpatient Prescriptions Marked as Taking for the 9/26/18 encounter (Office Visit) with Carmelo Merritt MD   Medication Sig Dispense Refill    metFORMIN (GLUCOPHAGE) 500 MG tablet TAKE 1 TABLET TWICE DAILY WITH MEALS 180 tablet 0    nitroGLYCERIN (NITROSTAT) 0.4 MG SL tablet PLACE 1 TABLET UNDER THE TONGUE EVERY 5 MINUTES AS NEEDED 25 tablet 2    nitroGLYCERIN (NITROSTAT) 0.4 MG SL tablet Place 1 tablet under the tongue every 5 minutes as needed (PRN) 25 tablet 5    ramipril (ALTACE) 5 MG capsule TAKE 1 CAPSULE EVERY DAY 90 capsule 3    atorvastatin (LIPITOR) 80 MG tablet TAKE 1 TABLET EVERY DAY 90 tablet 3    metoprolol tartrate (LOPRESSOR) 25 MG tablet TAKE 1/2 TABLET TWICE DAILY 90 tablet 3    esomeprazole (NEXIUM) 20 MG capsule Take 20 mg by mouth every morning (before breakfast)      aspirin 81 MG EC tablet 81 mg Take 2 tablets daily         Physical Examination       The following were examined and determined to be normal: Psych/Neuro, Scalp/hair, Head/face, Conjunctivae/eyelids, Gums/teeth/lips, Neck, Breast/axilla/chest, Abdomen, Back, RUE, LUE, RLE and LLE. The following were examined and determined to be abnormal: Nails/digits. Well-appearing. 1.  Back, shoulders, focally on the abdomen and lateral cheeks are multiple stuck on appearing verrucous brown papules and plaques. 2.  All toenails with onycholysis and subungual hyperkeratosis. 3.  Left lateral cheek with a linear surgical scar. Assessment and Plan     1. SK (seborrheic keratosis) - multiple     Reassurance. 2. Onychomycosis - asymptomatic and not bothersome    Reassurance. 3. 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 He was encouraged to perform monthly self skin exams and to call with any new or concerning lesions. Sun protective behaviors were encouraged. Follow up for full skin exam in 1 year. Return in about 1 year (around 9/26/2019).

## 2019-01-04 RX ORDER — RAMIPRIL 5 MG/1
CAPSULE ORAL
Qty: 90 CAPSULE | Refills: 0 | Status: SHIPPED | OUTPATIENT
Start: 2019-01-04 | End: 2019-04-16 | Stop reason: SDUPTHER

## 2019-01-04 RX ORDER — ATORVASTATIN CALCIUM 80 MG/1
TABLET, FILM COATED ORAL
Qty: 90 TABLET | Refills: 0 | Status: SHIPPED | OUTPATIENT
Start: 2019-01-04 | End: 2019-04-16 | Stop reason: SDUPTHER

## 2019-01-11 DIAGNOSIS — E11.9 TYPE 2 DIABETES MELLITUS WITHOUT COMPLICATION, WITHOUT LONG-TERM CURRENT USE OF INSULIN (HCC): ICD-10-CM

## 2019-02-20 DIAGNOSIS — E11.9 TYPE 2 DIABETES MELLITUS WITHOUT COMPLICATION, WITHOUT LONG-TERM CURRENT USE OF INSULIN (HCC): ICD-10-CM

## 2019-03-11 ENCOUNTER — OFFICE VISIT (OUTPATIENT)
Dept: CARDIOLOGY CLINIC | Age: 74
End: 2019-03-11
Payer: MEDICARE

## 2019-03-11 VITALS
HEIGHT: 73 IN | SYSTOLIC BLOOD PRESSURE: 120 MMHG | HEART RATE: 70 BPM | BODY MASS INDEX: 25.45 KG/M2 | WEIGHT: 192 LBS | OXYGEN SATURATION: 95 % | DIASTOLIC BLOOD PRESSURE: 64 MMHG

## 2019-03-11 DIAGNOSIS — I25.10 CAD IN NATIVE ARTERY: Primary | ICD-10-CM

## 2019-03-11 DIAGNOSIS — I25.2 MI, OLD: ICD-10-CM

## 2019-03-11 DIAGNOSIS — I10 ESSENTIAL HYPERTENSION: ICD-10-CM

## 2019-03-11 PROCEDURE — G8427 DOCREV CUR MEDS BY ELIG CLIN: HCPCS | Performed by: INTERNAL MEDICINE

## 2019-03-11 PROCEDURE — 1036F TOBACCO NON-USER: CPT | Performed by: INTERNAL MEDICINE

## 2019-03-11 PROCEDURE — 1101F PT FALLS ASSESS-DOCD LE1/YR: CPT | Performed by: INTERNAL MEDICINE

## 2019-03-11 PROCEDURE — 3017F COLORECTAL CA SCREEN DOC REV: CPT | Performed by: INTERNAL MEDICINE

## 2019-03-11 PROCEDURE — G8417 CALC BMI ABV UP PARAM F/U: HCPCS | Performed by: INTERNAL MEDICINE

## 2019-03-11 PROCEDURE — 4040F PNEUMOC VAC/ADMIN/RCVD: CPT | Performed by: INTERNAL MEDICINE

## 2019-03-11 PROCEDURE — 1123F ACP DISCUSS/DSCN MKR DOCD: CPT | Performed by: INTERNAL MEDICINE

## 2019-03-11 PROCEDURE — G8484 FLU IMMUNIZE NO ADMIN: HCPCS | Performed by: INTERNAL MEDICINE

## 2019-03-11 PROCEDURE — 99214 OFFICE O/P EST MOD 30 MIN: CPT | Performed by: INTERNAL MEDICINE

## 2019-03-11 PROCEDURE — G8598 ASA/ANTIPLAT THER USED: HCPCS | Performed by: INTERNAL MEDICINE

## 2019-04-19 RX ORDER — RAMIPRIL 5 MG/1
CAPSULE ORAL
Qty: 90 CAPSULE | Refills: 3 | Status: SHIPPED | OUTPATIENT
Start: 2019-04-19 | End: 2019-04-23 | Stop reason: SDUPTHER

## 2019-04-19 RX ORDER — ATORVASTATIN CALCIUM 80 MG/1
TABLET, FILM COATED ORAL
Qty: 90 TABLET | Refills: 3 | Status: SHIPPED | OUTPATIENT
Start: 2019-04-19 | End: 2020-03-04

## 2019-04-23 RX ORDER — RAMIPRIL 5 MG/1
CAPSULE ORAL
Qty: 90 CAPSULE | Refills: 1 | Status: SHIPPED | OUTPATIENT
Start: 2019-04-23 | End: 2020-06-11

## 2019-08-28 DIAGNOSIS — E11.9 TYPE 2 DIABETES MELLITUS WITHOUT COMPLICATION, WITHOUT LONG-TERM CURRENT USE OF INSULIN (HCC): ICD-10-CM

## 2019-09-11 ENCOUNTER — OFFICE VISIT (OUTPATIENT)
Dept: CARDIOLOGY CLINIC | Age: 74
End: 2019-09-11
Payer: MEDICARE

## 2019-09-11 VITALS
BODY MASS INDEX: 25.6 KG/M2 | WEIGHT: 194 LBS | HEART RATE: 68 BPM | DIASTOLIC BLOOD PRESSURE: 62 MMHG | SYSTOLIC BLOOD PRESSURE: 125 MMHG

## 2019-09-11 DIAGNOSIS — I25.10 CAD IN NATIVE ARTERY: Primary | ICD-10-CM

## 2019-09-11 DIAGNOSIS — I10 ESSENTIAL HYPERTENSION: ICD-10-CM

## 2019-09-11 DIAGNOSIS — I25.2 MI, OLD: ICD-10-CM

## 2019-09-11 PROCEDURE — G8598 ASA/ANTIPLAT THER USED: HCPCS | Performed by: INTERNAL MEDICINE

## 2019-09-11 PROCEDURE — 3017F COLORECTAL CA SCREEN DOC REV: CPT | Performed by: INTERNAL MEDICINE

## 2019-09-11 PROCEDURE — G8427 DOCREV CUR MEDS BY ELIG CLIN: HCPCS | Performed by: INTERNAL MEDICINE

## 2019-09-11 PROCEDURE — 99214 OFFICE O/P EST MOD 30 MIN: CPT | Performed by: INTERNAL MEDICINE

## 2019-09-11 PROCEDURE — 1123F ACP DISCUSS/DSCN MKR DOCD: CPT | Performed by: INTERNAL MEDICINE

## 2019-09-11 PROCEDURE — 4040F PNEUMOC VAC/ADMIN/RCVD: CPT | Performed by: INTERNAL MEDICINE

## 2019-09-11 PROCEDURE — 1036F TOBACCO NON-USER: CPT | Performed by: INTERNAL MEDICINE

## 2019-09-11 PROCEDURE — G8417 CALC BMI ABV UP PARAM F/U: HCPCS | Performed by: INTERNAL MEDICINE

## 2019-09-30 ENCOUNTER — OFFICE VISIT (OUTPATIENT)
Dept: DERMATOLOGY | Age: 74
End: 2019-09-30
Payer: MEDICARE

## 2019-09-30 DIAGNOSIS — Z86.006 HISTORY OF MELANOMA IN SITU: ICD-10-CM

## 2019-09-30 DIAGNOSIS — L72.0 EPIDERMOID CYST: ICD-10-CM

## 2019-09-30 DIAGNOSIS — L82.1 SK (SEBORRHEIC KERATOSIS): Primary | ICD-10-CM

## 2019-09-30 PROCEDURE — G8598 ASA/ANTIPLAT THER USED: HCPCS | Performed by: DERMATOLOGY

## 2019-09-30 PROCEDURE — 1123F ACP DISCUSS/DSCN MKR DOCD: CPT | Performed by: DERMATOLOGY

## 2019-09-30 PROCEDURE — G8417 CALC BMI ABV UP PARAM F/U: HCPCS | Performed by: DERMATOLOGY

## 2019-09-30 PROCEDURE — 3017F COLORECTAL CA SCREEN DOC REV: CPT | Performed by: DERMATOLOGY

## 2019-09-30 PROCEDURE — 4040F PNEUMOC VAC/ADMIN/RCVD: CPT | Performed by: DERMATOLOGY

## 2019-09-30 PROCEDURE — G8427 DOCREV CUR MEDS BY ELIG CLIN: HCPCS | Performed by: DERMATOLOGY

## 2019-09-30 PROCEDURE — 1036F TOBACCO NON-USER: CPT | Performed by: DERMATOLOGY

## 2019-09-30 PROCEDURE — 99213 OFFICE O/P EST LOW 20 MIN: CPT | Performed by: DERMATOLOGY

## 2019-09-30 NOTE — PROGRESS NOTES
[facility]  Suzan Hidalgo MD  [phone]      Sha Rivera  1945    76 y.o. male     Date of Visit: 9/30/2019    Chief Complaint: skin lesions    History of Present Illness:    1. He complains of several growths on the hands and back. 2.  He complains of a painless white lesion on the R clavicular region. 3.  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 He denies any signs of recurrence. Review of Systems:  Skin: No new or changing moles. Past Medical History, Family History, Surgical History, Medications and Allergies reviewed.     Past Medical History:   Diagnosis Date    CAD (coronary artery disease)     Hyperlipidemia     Hypertension     Keratosis, seborrheic     Lower GI bleed     Myocardial infarction (HCC)     Sensorineural hearing loss of right ear     Shingles      Past Surgical History:   Procedure Laterality Date    CORONARY ANGIOPLASTY WITH STENT PLACEMENT      UPPER GASTROINTESTINAL ENDOSCOPY      received clipping for ania rice tear       Allergies   Allergen Reactions    Codeine     Sulfa Antibiotics Nausea And Vomiting     Outpatient Medications Marked as Taking for the 9/30/19 encounter (Office Visit) with Aparna Ochoa MD   Medication Sig Dispense Refill    metFORMIN (GLUCOPHAGE) 500 MG tablet TAKE 1 TABLET TWICE DAILY WITH MEALS 180 tablet 1    ramipril (ALTACE) 5 MG capsule TAKE 1 CAPSULE EVERY DAY 90 capsule 1    atorvastatin (LIPITOR) 80 MG tablet TAKE 1 TABLET EVERY DAY 90 tablet 3    metoprolol tartrate (LOPRESSOR) 25 MG tablet TAKE 1/2 TABLET TWICE DAILY 90 tablet 3    nitroGLYCERIN (NITROSTAT) 0.4 MG SL tablet PLACE 1 TABLET UNDER THE TONGUE EVERY 5 MINUTES AS NEEDED 25 tablet 2    nitroGLYCERIN (NITROSTAT) 0.4 MG SL tablet Place 1 tablet under the tongue every 5 minutes as needed (PRN) 25 tablet 5    esomeprazole (NEXIUM) 20 MG capsule Take 20 mg by mouth every morning (before breakfast)      aspirin 81 MG EC tablet 81 mg Take 2 tablets daily           Physical Examination       The following were examined and determined to be normal: Psych/Neuro, Scalp/hair, Head/face, Conjunctivae/eyelids, Gums/teeth/lips, Neck, Breast/axilla/chest, Abdomen, Back, RUE, LUE, RLE, LLE and Nails/digits. The following were examined and determined to be abnormal: None. Well appearing. 1.  Dorsum of the hands - few round verrucous tan grey papules. Clavicular region, back: stuck-on appearing tan-brown verrucous papules and plaques. 2.  Right medial clavicular region - oval shaped white papule. 3.  Left cheek - curvelinear surgical scar. Assessment and Plan     1. SK (seborrheic keratosis)     Reassurance. 2. Epidermoid cyst - small and asymptomatic    Reassurance. 3. 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 Sun protective behaviors and self skin examinations were encouraged. Call for any new or concerning lesions. Return in about 1 year (around 9/30/2020).

## 2020-03-04 NOTE — TELEPHONE ENCOUNTER
Requested Prescriptions     Pending Prescriptions Disp Refills    metoprolol tartrate (LOPRESSOR) 25 MG tablet [Pharmacy Med Name: METOPROLOL TARTRATE 25 MG Tablet] 90 tablet 3     Sig: TAKE 1/2 TABLET TWICE DAILY    atorvastatin (LIPITOR) 80 MG tablet [Pharmacy Med Name: ATORVASTATIN CALCIUM 80 MG Tablet] 90 tablet 3     Sig: TAKE 1 TABLET EVERY DAY                   Last Office Visit: 9/11/19    Next Office Visit: 3/11/20

## 2020-03-10 RX ORDER — ATORVASTATIN CALCIUM 80 MG/1
TABLET, FILM COATED ORAL
Qty: 90 TABLET | Refills: 3 | Status: SHIPPED | OUTPATIENT
Start: 2020-03-10 | End: 2021-02-10

## 2020-03-11 ENCOUNTER — OFFICE VISIT (OUTPATIENT)
Dept: CARDIOLOGY CLINIC | Age: 75
End: 2020-03-11
Payer: MEDICARE

## 2020-03-11 VITALS
HEART RATE: 68 BPM | BODY MASS INDEX: 26.25 KG/M2 | SYSTOLIC BLOOD PRESSURE: 130 MMHG | DIASTOLIC BLOOD PRESSURE: 80 MMHG | WEIGHT: 199 LBS

## 2020-03-11 PROCEDURE — 99214 OFFICE O/P EST MOD 30 MIN: CPT | Performed by: INTERNAL MEDICINE

## 2020-03-11 PROCEDURE — 3017F COLORECTAL CA SCREEN DOC REV: CPT | Performed by: INTERNAL MEDICINE

## 2020-03-11 PROCEDURE — 1123F ACP DISCUSS/DSCN MKR DOCD: CPT | Performed by: INTERNAL MEDICINE

## 2020-03-11 PROCEDURE — G8427 DOCREV CUR MEDS BY ELIG CLIN: HCPCS | Performed by: INTERNAL MEDICINE

## 2020-03-11 PROCEDURE — G8417 CALC BMI ABV UP PARAM F/U: HCPCS | Performed by: INTERNAL MEDICINE

## 2020-03-11 PROCEDURE — 1036F TOBACCO NON-USER: CPT | Performed by: INTERNAL MEDICINE

## 2020-03-11 PROCEDURE — G8484 FLU IMMUNIZE NO ADMIN: HCPCS | Performed by: INTERNAL MEDICINE

## 2020-03-11 PROCEDURE — 4040F PNEUMOC VAC/ADMIN/RCVD: CPT | Performed by: INTERNAL MEDICINE

## 2020-03-11 NOTE — PROGRESS NOTES
Subjective:      Patient ID: Chase Yan is a 76 y.o. male. HPI Cheryl Harrell is here today for 6 month follow up CAD/HTN/MI. Continues to walk Very active. No angina. No sob. No pnd or orthopnea. No edema. Wt down. No tachy/syncope. BP good. Walking 5 miles a day 3x per wk.   Feeling good       Past Medical History:   Diagnosis Date    CAD (coronary artery disease)     Hyperlipidemia     Hypertension     Keratosis, seborrheic     Lower GI bleed     Myocardial infarction (HCC)     Sensorineural hearing loss of right ear     Shingles      Past Surgical History:   Procedure Laterality Date    CORONARY ANGIOPLASTY WITH STENT PLACEMENT      UPPER GASTROINTESTINAL ENDOSCOPY      received clipping for ania rice tear       Social History     Socioeconomic History    Marital status:      Spouse name: Not on file    Number of children: 2    Years of education: Not on file    Highest education level: Not on file   Occupational History    Occupation:      Comment: retired   Social Needs    Financial resource strain: Not on file    Food insecurity     Worry: Not on file     Inability: Not on file   [employer] needs     Medical: Not on file     Non-medical: Not on file   Tobacco Use    Smoking status: Never Smoker    Smokeless tobacco: Former User   Substance and Sexual Activity    Alcohol use: No    Drug use: No    Sexual activity: Not on file   Lifestyle    Physical activity     Days per week: Not on file     Minutes per session: Not on file    Stress: Not on file   Relationships    Social connections     Talks on phone: Not on file     Gets together: Not on file     Attends Mosque service: Not on file     Active member of club or organization: Not on file     Attends meetings of clubs or organizations: Not on file     Relationship status: Not on file    Intimate partner violence     Fear of current or ex partner: Not on file     Emotionally abused: Not on file

## 2020-06-12 RX ORDER — RAMIPRIL 5 MG/1
CAPSULE ORAL
Qty: 90 CAPSULE | Refills: 3 | Status: SHIPPED | OUTPATIENT
Start: 2020-06-12 | End: 2021-04-21

## 2020-09-15 ENCOUNTER — OFFICE VISIT (OUTPATIENT)
Dept: CARDIOLOGY CLINIC | Age: 75
End: 2020-09-15
Payer: MEDICARE

## 2020-09-15 VITALS
TEMPERATURE: 97.7 F | HEIGHT: 73 IN | HEART RATE: 91 BPM | WEIGHT: 202 LBS | SYSTOLIC BLOOD PRESSURE: 126 MMHG | BODY MASS INDEX: 26.77 KG/M2 | DIASTOLIC BLOOD PRESSURE: 78 MMHG

## 2020-09-15 PROCEDURE — G8417 CALC BMI ABV UP PARAM F/U: HCPCS | Performed by: INTERNAL MEDICINE

## 2020-09-15 PROCEDURE — 1123F ACP DISCUSS/DSCN MKR DOCD: CPT | Performed by: INTERNAL MEDICINE

## 2020-09-15 PROCEDURE — 99214 OFFICE O/P EST MOD 30 MIN: CPT | Performed by: INTERNAL MEDICINE

## 2020-09-15 PROCEDURE — 1036F TOBACCO NON-USER: CPT | Performed by: INTERNAL MEDICINE

## 2020-09-15 PROCEDURE — 3017F COLORECTAL CA SCREEN DOC REV: CPT | Performed by: INTERNAL MEDICINE

## 2020-09-15 PROCEDURE — G8427 DOCREV CUR MEDS BY ELIG CLIN: HCPCS | Performed by: INTERNAL MEDICINE

## 2020-09-15 PROCEDURE — 4040F PNEUMOC VAC/ADMIN/RCVD: CPT | Performed by: INTERNAL MEDICINE

## 2020-09-15 NOTE — PROGRESS NOTES
Subjective:      Patient ID: Dean Schwab is a 76 y.o. male. HPI Ramesh Verma is here today for follow up CAD/HTN/MI. Continues to walk 5x per wk. Very active. No angina. No sob. No pnd or orthopnea. No edema. Wt down. No tachy/syncope. BP good. Walking 5 miles a day.   Feeling good       Past Medical History:   Diagnosis Date    CAD (coronary artery disease)     Hyperlipidemia     Hypertension     Keratosis, seborrheic     Lower GI bleed     Myocardial infarction (HCC)     Sensorineural hearing loss of right ear     Shingles      Past Surgical History:   Procedure Laterality Date    CORONARY ANGIOPLASTY WITH STENT PLACEMENT      UPPER GASTROINTESTINAL ENDOSCOPY      received clipping for ania rice tear       Social History     Socioeconomic History    Marital status:      Spouse name: Not on file    Number of children: 2    Years of education: Not on file    Highest education level: Not on file   Occupational History    Occupation: spray painter     Comment: retired   Social Needs    Financial resource strain: Not on file    Food insecurity     Worry: Not on file     Inability: Not on file   [employer] needs     Medical: Not on file     Non-medical: Not on file   Tobacco Use    Smoking status: Never Smoker    Smokeless tobacco: Former User   Substance and Sexual Activity    Alcohol use: No    Drug use: No    Sexual activity: Not on file   Lifestyle    Physical activity     Days per week: Not on file     Minutes per session: Not on file    Stress: Not on file   Relationships    Social connections     Talks on phone: Not on file     Gets together: Not on file     Attends [employer] service: Not on file     Active member of club or organization: Not on file     Attends meetings of clubs or organizations: Not on file     Relationship status: Not on file    Intimate partner violence     Fear of current or ex partner: Not on file     Emotionally abused: Not on file old               Plan:      CV stable. No chest pain. Continues to exercise. BP is good. No exertional sx. Reviewed previous cardiac records and testing including myoview 11/14. Continue to monitor. Follow up 6 months. Lipids per PCP. Continues wishes to be  followed clinically.

## 2020-09-28 ENCOUNTER — OFFICE VISIT (OUTPATIENT)
Dept: DERMATOLOGY | Age: 75
End: 2020-09-28
Payer: MEDICARE

## 2020-09-28 VITALS — TEMPERATURE: 98.4 F

## 2020-09-28 PROCEDURE — 99213 OFFICE O/P EST LOW 20 MIN: CPT | Performed by: DERMATOLOGY

## 2020-09-28 PROCEDURE — 1123F ACP DISCUSS/DSCN MKR DOCD: CPT | Performed by: DERMATOLOGY

## 2020-09-28 PROCEDURE — 1036F TOBACCO NON-USER: CPT | Performed by: DERMATOLOGY

## 2020-09-28 PROCEDURE — 3017F COLORECTAL CA SCREEN DOC REV: CPT | Performed by: DERMATOLOGY

## 2020-09-28 PROCEDURE — G8427 DOCREV CUR MEDS BY ELIG CLIN: HCPCS | Performed by: DERMATOLOGY

## 2020-09-28 PROCEDURE — 4040F PNEUMOC VAC/ADMIN/RCVD: CPT | Performed by: DERMATOLOGY

## 2020-09-28 PROCEDURE — G8417 CALC BMI ABV UP PARAM F/U: HCPCS | Performed by: DERMATOLOGY

## 2020-09-28 NOTE — PROGRESS NOTES
[facility]  Js Huff MD  [phone]      Karan Avendañoantony  1945    76 y.o. male     Date of Visit: 9/28/2020    Chief Complaint: skin lesions    History of Present Illness:    1. He complains of multiple asymptomatic growths on the back. 2.  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  He denies any signs of recurrence. Review of Systems:  Skin: No new or changing moles. Past Medical History, Family History, Surgical History, Medications and Allergies reviewed.     Past Medical History:   Diagnosis Date    CAD (coronary artery disease)     Hyperlipidemia     Hypertension     Keratosis, seborrheic     Lower GI bleed     Myocardial infarction (HCC)     Sensorineural hearing loss of right ear     Shingles      Past Surgical History:   Procedure Laterality Date    CORONARY ANGIOPLASTY WITH STENT PLACEMENT      UPPER GASTROINTESTINAL ENDOSCOPY      received clipping for ania rice tear       Allergies   Allergen Reactions    Codeine     Sulfa Antibiotics Nausea And Vomiting     Outpatient Medications Marked as Taking for the 9/28/20 encounter (Office Visit) with Todd Hooker MD   Medication Sig Dispense Refill    metFORMIN (GLUCOPHAGE) 500 MG tablet TAKE 1 TABLET TWICE DAILY WITH MEALS 180 tablet 0    ramipril (ALTACE) 5 MG capsule TAKE 1 CAPSULE EVERY DAY 90 capsule 3    metoprolol tartrate (LOPRESSOR) 25 MG tablet TAKE 1/2 TABLET TWICE DAILY 90 tablet 3    atorvastatin (LIPITOR) 80 MG tablet TAKE 1 TABLET EVERY DAY 90 tablet 3    nitroGLYCERIN (NITROSTAT) 0.4 MG SL tablet Place 1 tablet under the tongue every 5 minutes as needed (PRN) 25 tablet 5    esomeprazole (NEXIUM) 20 MG capsule Take 20 mg by mouth every morning (before breakfast)      aspirin 81 MG EC tablet 81 mg Take 2 tablets daily         Physical Examination       The following were examined and determined to be normal: Psych/Neuro, Scalp/hair, Head/face, Conjunctivae/eyelids, Gums/teeth/lips, Neck, Breast/axilla/chest, Abdomen, Back, RUE, LUE, RLE, LLE and Nails/digits. The following were examined and determined to be abnormal: None. Well-appearing. 1.  Supraclavicular region and back with multiple stuck on appearing verrucous brown papules and plaques. 2.  Left cheek with a linear surgical scar. Assessment and Plan     1. SK (seborrheic keratosis) - multiple     Reassurance. 2. 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 Sun protective behaviors and self skin examinations were encouraged. Call for any new or concerning lesions. Return in about 1 year (around 9/28/2021).

## 2020-11-24 ENCOUNTER — HOSPITAL ENCOUNTER (EMERGENCY)
Age: 75
Discharge: HOME | End: 2020-11-24
Attending: EMERGENCY MEDICINE
Payer: MEDICARE

## 2020-11-24 VITALS
TEMPERATURE: 97.9 F | SYSTOLIC BLOOD PRESSURE: 135 MMHG | HEART RATE: 76 BPM | BODY MASS INDEX: 24.78 KG/M2 | DIASTOLIC BLOOD PRESSURE: 72 MMHG | RESPIRATION RATE: 18 BRPM | HEIGHT: 73 IN | WEIGHT: 187 LBS | OXYGEN SATURATION: 99 %

## 2020-11-24 LAB
RAPID INFLUENZA  B AGN: NEGATIVE
RAPID INFLUENZA A AGN: NEGATIVE

## 2020-11-24 PROCEDURE — U0003 INFECTIOUS AGENT DETECTION BY NUCLEIC ACID (DNA OR RNA); SEVERE ACUTE RESPIRATORY SYNDROME CORONAVIRUS 2 (SARS-COV-2) (CORONAVIRUS DISEASE [COVID-19]), AMPLIFIED PROBE TECHNIQUE, MAKING USE OF HIGH THROUGHPUT TECHNOLOGIES AS DESCRIBED BY CMS-2020-01-R: HCPCS

## 2020-11-24 PROCEDURE — 87804 INFLUENZA ASSAY W/OPTIC: CPT

## 2020-11-24 PROCEDURE — 99283 EMERGENCY DEPT VISIT LOW MDM: CPT

## 2020-11-24 PROCEDURE — U0002 COVID-19 LAB TEST NON-CDC: HCPCS

## 2020-11-24 NOTE — ED NOTES
Pt is concerned he has COVID-19. Pt states he has lost his sense of taste and smell x a week. Pt reports that he went to a testing facility yesterday, but the wait was too long so he left. Pt states he may have been exposed to [place] via [employer], but doesn't know for sure. He reports being cautious when he is out due to the virus. Vital signs are stable. Pt ambulates to room with easy WOB and unlabored respirations.       Angela Velasco RN  11/24/20 [id number]

## 2020-11-24 NOTE — ED PROVIDER NOTES
[facility]          ATTENDING PHYSICIAN NOTE       Date of evaluation: 11/24/2020    Chief Complaint     Concern For COVID-19 (Pt reports loss of taste and smell x a week, concerned he has Covid)      History of Present Illness     Teresa Ruiz is a 76 y.o. male who presents not feeling well, loss of taste. Patient reports feeling somewhat off about 1 week ago with occasional aches and decreased energy. About 3 days ago he noticed that his taste was not working well and thinks maybe had some fevers yesterday. He has had decreased energy and is not walking as often for the past week. No nausea or vomiting. No loss of smell. No shortness of breath. No productive cough or sore throat. No known exposures, the patient reports he is around his wife that is really the only people he is frequently around. He has no known Covid exposures. No abdominal pain. Review of Systems     Review of Systems negative for shortness of breath. Questionable fevers yesterday. No nausea or vomiting. No chest pain. Positive for loss of taste, intact sense of smell. Past Medical, Surgical, Family, and Social History     He has a past medical history of CAD (coronary artery disease), Hyperlipidemia, Hypertension, Keratosis, seborrheic, Lower GI bleed, Myocardial infarction ([address] Sensorineural hearing loss of right ear, and Shingles. He has a past surgical history that includes Coronary angioplasty with stent and Upper gastrointestinal endoscopy. His family history includes Cancer in his brother and brother; Diabetes in his father; Heart Disease in his brother, brother, maternal grandfather, and mother; Heart Failure in his father and mother; Hypertension in his mother. He reports that he has never smoked. He has quit using smokeless tobacco. He reports that he does not drink alcohol or use drugs.     Medications     Previous Medications    ASPIRIN 81 MG EC TABLET    81 mg Take 2 tablets daily    ATORVASTATIN (LIPITOR) 80 MG TABLET    TAKE 1 TABLET EVERY DAY    ESOMEPRAZOLE (NEXIUM) 20 MG CAPSULE    Take 20 mg by mouth every morning (before breakfast)    METFORMIN (GLUCOPHAGE) 500 MG TABLET    TAKE 1 TABLET TWICE DAILY WITH MEALS (LAST REFILL, APPOINTMENT IS NEEDED)    METOPROLOL TARTRATE (LOPRESSOR) 25 MG TABLET    TAKE 1/2 TABLET TWICE DAILY    NITROGLYCERIN (NITROSTAT) 0.4 MG SL TABLET    Place 1 tablet under the tongue every 5 minutes as needed (PRN)    RAMIPRIL (ALTACE) 5 MG CAPSULE    TAKE 1 CAPSULE EVERY DAY       Allergies     He is allergic to codeine and sulfa antibiotics. Physical Exam     INITIAL VITALS: BP: (!) 168/62, Temp: 97.9 °F (36.6 °C), Pulse: 76, Resp: 18, SpO2: 98 %   Physical Exam  General--sitting up in bed, alert, no distress  HEENT--no signs of trauma, pupils are equal round react to light, EOMI, no facial asymmetry,  Neck--trachea midline, good range of motion  Chest--equal and clear breath sounds bilaterally  Heart--regular rate and rhythm, no appreciable murmurs  Abdomen--soft, nontender, bowel sounds are present  Extremities--good pulses throughout, no unusual swelling  Neuro--awake, alert, speech is clear, no facial asymmetry, no focal weakness  Psych--appropriate affect and mood  Diagnostic Results         RADIOLOGY:  No orders to display       LABS:   No results found for this visit on 11/24/20. RECENT VITALS:  BP: 135/72,Temp: 97.9 °F (36.6 °C), Pulse: 76, Resp: 18, SpO2: 99 %     Procedures         ED Course     Nursing Notes, Past Medical Hx, Past Surgical Hx, Social Hx,Allergies, and Family Hx were reviewed. patient was given the following medications:  No orders of the defined types were placed in this encounter. CONSULTS:  None    MEDICAL DECISIONMAKING / ASSESSMENT / Mabel Jones is a 76 y.o. male comes in with the above-mentioned symptoms with concern for Covid virus.   He is awake, alert, not hypoxic, not febrile, looks otherwise well. The patient does not have any admission criteria at this time, we will test him for Covid as well as influenza, he did not get a flu shot this year. The patient knows to return for any sudden change in symptoms such as worsening shortness of breath. Otherwise he knows to go home, wear his mask, wash his hands and avoid contact with other people till he has his test results back. Patient understands this, and is stable for discharge. .        Clinical Impression     Loss of sense of taste    Viral syndrome    Suspicion for Covid virus    Disposition     PATIENT REFERRED TO:  No follow-up provider specified.     DISCHARGE MEDICATIONS:  New Prescriptions    No medications on file       [address], MD  11/24/20 [id number]

## 2020-11-24 NOTE — ED NOTES
Pt discharged to home. Pt verbalized understanding of discharged instructions. Pt left ED ambulatory without incident.         Shereen Dangelo RN  11/24/20 [id number]

## 2020-11-25 ENCOUNTER — CARE COORDINATION (OUTPATIENT)
Dept: CARE COORDINATION | Age: 75
End: 2020-11-25

## 2020-11-25 ENCOUNTER — TELEPHONE (OUTPATIENT)
Dept: INTERNAL MEDICINE CLINIC | Age: 75
End: 2020-11-25

## 2020-11-25 LAB — SARS-COV-2, PCR: DETECTED

## 2020-11-25 RX ORDER — AMOXICILLIN 500 MG/1
500 CAPSULE ORAL 3 TIMES DAILY
Qty: 21 CAPSULE | Refills: 0 | Status: SHIPPED | OUTPATIENT
Start: 2020-11-25 | End: 2020-12-02

## 2020-11-25 NOTE — CARE COORDINATION
Patient contacted regarding [id number] diagnosis\". Discussed COVID-19 related testing which was available at this time. Test results were positive. Patient informed of results, if available? Yes    Care Transition Nurse/ Ambulatory Care Manager contacted the patient by telephone to perform post discharge assessment. Call within 2 business days of discharge: Yes. Verified name and  with patient as identifiers. Provided introduction to self, and explanation of the CTN/ACM role, and reason for call due to risk factors for infection and/or exposure to COVID-19. Symptoms reviewed with patient who verbalized the following symptoms: fever, fatigue and nausea. Due to no new or worsening symptoms encounter was not routed to provider for escalation. Discussed follow-up appointments. If no appointment was previously scheduled, appointment scheduling offered: yes  [facility] follow up appointment(s):   Future Appointments   Date Time Provider Monisha Yeung   3/19/2021 10:15 AM MD Sebastien Andrade   2021 10:15 AM MD Kasandra Enamorado [facility]     Non-[facility] follow up appointment(s): none    Non-face-to-face services provided:  Reviewed and followed up on pending diagnostic tests and treatments-for COVID 19  Education of patient/family/caregiver/guardian to support self-management-for COVID 19     Advance Care Planning:   Does patient have an Advance Directive:  not on file; education provided. Patient has following risk factors of: had a heart attack 15 years ago. CTN/ACM reviewed discharge instructions, medical action plan and red flags such as increased shortness of breath, increasing fever and signs of decompensation with patient who verbalized understanding. Discussed exposure protocols and quarantine with CDC Guidelines What to do if you are sick with coronavirus disease .  Patient was given an opportunity for questions and concerns.  The patient agrees to contact the [facility] exposure line [phone], local [facility] department [place] [facility]: ([phone]) and PCP office for questions related to their healthcare. CTN/ACM provided contact information for future needs. Reviewed and educated patient on any new and changed medications related to discharge diagnosis     Patient/family/caregiver given information for [facility] Loop and agrees to enroll no  Patient's preferred e-mail: declined   Patient's preferred phone number: declined  Based on Loop alert triggers, patient will be contacted by nurse care manager for worsening symptoms. Plan for follow-up call in 3-5 days based on severity of symptoms and risk factors.

## 2020-11-25 NOTE — TELEPHONE ENCOUNTER
The patient is reporting positive COVID and has infected front lower teeth. He was asking for an antibiotic. Patient was advised Dr. Moni Reyes will be out of office

## 2020-11-27 ENCOUNTER — CARE COORDINATION (OUTPATIENT)
Dept: CARE COORDINATION | Age: 75
End: 2020-11-27

## 2020-11-27 NOTE — CARE COORDINATION
Follow up call made to patient. States he is feeling better. His PCP is out of town but was able to have an antibiotic called in for infected teeth. Patient reported that his wife Felicita Chadwick tested positive for COVID 19 as well. COVID 19 education reinforced. Patient states no further needs at this time.

## 2020-12-04 ENCOUNTER — CARE COORDINATION (OUTPATIENT)
Dept: CARE COORDINATION | Age: 75
End: 2020-12-04

## 2020-12-04 NOTE — CARE COORDINATION
Patient contacted regarding COVID-19 risk and screening. Discussed COVID-19 related testing which was available at this time. Test results were positive. Patient informed of results, if available? Yes. Care Transition Nurse/ Ambulatory Care Manager contacted the patient by telephone to perform follow-up assessment. Verified name and  with patient as identifiers. Patient has following risk factors of: no known risk factors. Symptoms reviewed with patient who verbalized the following symptoms: patient states he is syptom free at this time. Due to no new or worsening symptoms encounter was not routed to provider for escalation. Education provided regarding infection prevention, and signs and symptoms of COVID-19 and when to seek medical attention with patient who verbalized understanding. Discussed exposure protocols and quarantine from [address] you at higher risk for severe illness  and given an opportunity for questions and concerns. The patient agrees to contact the COVID-19 hotline [phone] or PCP office for questions related to their healthcare. CTN/ACM provided contact information for future reference. From CDC: Are you at higher risk for severe illness?  Wash your hands often.  Avoid close contact (6 feet, which is about two arm lengths) with people who are sick.  Put distance between yourself and other people if COVID-19 is spreading in your community.  Clean and disinfect frequently touched surfaces.  Avoid all cruise travel and non-essential air travel.  Call your healthcare professional if you have concerns about COVID-19 and your underlying condition or if you are sick. For more information on steps you can take to protect yourself, see CDC's How to [place] for follow-up call in 3-5 days based on severity of symptoms and risk factors.

## 2020-12-15 ENCOUNTER — OFFICE VISIT (OUTPATIENT)
Dept: PRIMARY CARE CLINIC | Age: 75
End: 2020-12-15
Payer: MEDICARE

## 2020-12-15 PROCEDURE — G8428 CUR MEDS NOT DOCUMENT: HCPCS | Performed by: NURSE PRACTITIONER

## 2020-12-15 PROCEDURE — G8420 CALC BMI NORM PARAMETERS: HCPCS | Performed by: NURSE PRACTITIONER

## 2020-12-15 PROCEDURE — 99211 OFF/OP EST MAY X REQ PHY/QHP: CPT | Performed by: NURSE PRACTITIONER

## 2020-12-15 NOTE — PROGRESS NOTES
Moises Maldonado received a viral test for COVID-19. They were educated on isolation and quarantine as appropriate. For any symptoms, they were directed to seek care from their PCP, given contact information to establish with a doctor, directed to an urgent care or the emergency room.

## 2020-12-17 LAB — SARS-COV-2, NAA: NOT DETECTED

## 2020-12-18 ENCOUNTER — TELEPHONE (OUTPATIENT)
Dept: INTERNAL MEDICINE CLINIC | Age: 75
End: 2020-12-18

## 2021-01-07 DIAGNOSIS — E11.9 TYPE 2 DIABETES MELLITUS WITHOUT COMPLICATION, WITHOUT LONG-TERM CURRENT USE OF INSULIN (HCC): ICD-10-CM

## 2021-01-22 ENCOUNTER — OFFICE VISIT (OUTPATIENT)
Dept: INTERNAL MEDICINE CLINIC | Age: 76
End: 2021-01-22
Payer: MEDICARE

## 2021-01-22 VITALS
TEMPERATURE: 98 F | SYSTOLIC BLOOD PRESSURE: 177 MMHG | BODY MASS INDEX: 26.9 KG/M2 | WEIGHT: 203 LBS | HEIGHT: 73 IN | DIASTOLIC BLOOD PRESSURE: 80 MMHG

## 2021-01-22 DIAGNOSIS — Z11.59 ENCOUNTER FOR HCV SCREENING TEST FOR LOW RISK PATIENT: ICD-10-CM

## 2021-01-22 DIAGNOSIS — Z00.00 ROUTINE GENERAL MEDICAL EXAMINATION AT A HEALTH CARE FACILITY: Primary | ICD-10-CM

## 2021-01-22 DIAGNOSIS — I10 ESSENTIAL HYPERTENSION: ICD-10-CM

## 2021-01-22 DIAGNOSIS — E11.9 TYPE 2 DIABETES MELLITUS WITHOUT COMPLICATION, WITHOUT LONG-TERM CURRENT USE OF INSULIN (HCC): ICD-10-CM

## 2021-01-22 DIAGNOSIS — I25.10 CORONARY ARTERY DISEASE DUE TO LIPID RICH PLAQUE: ICD-10-CM

## 2021-01-22 DIAGNOSIS — E78.5 DYSLIPIDEMIA: ICD-10-CM

## 2021-01-22 DIAGNOSIS — I25.83 CORONARY ARTERY DISEASE DUE TO LIPID RICH PLAQUE: ICD-10-CM

## 2021-01-22 LAB
A/G RATIO: 1.7 (ref 1.1–2.2)
ALBUMIN SERPL-MCNC: 4.3 G/DL (ref 3.4–5)
ALP BLD-CCNC: 131 U/L (ref 40–129)
ALT SERPL-CCNC: 17 U/L (ref 10–40)
ANION GAP SERPL CALCULATED.3IONS-SCNC: 10 MMOL/L (ref 3–16)
AST SERPL-CCNC: 24 U/L (ref 15–37)
BASOPHILS ABSOLUTE: 0 K/UL (ref 0–0.2)
BASOPHILS RELATIVE PERCENT: 0.7 %
BILIRUB SERPL-MCNC: 0.3 MG/DL (ref 0–1)
BUN BLDV-MCNC: 18 MG/DL (ref 7–20)
CALCIUM SERPL-MCNC: 9.7 MG/DL (ref 8.3–10.6)
CHLORIDE BLD-SCNC: 98 MMOL/L (ref 99–110)
CHOLESTEROL, TOTAL: 192 MG/DL (ref 0–199)
CO2: 27 MMOL/L (ref 21–32)
CREAT SERPL-MCNC: 0.8 MG/DL (ref 0.8–1.3)
CREATININE URINE: 135.9 MG/DL (ref 39–259)
EOSINOPHILS ABSOLUTE: 0 K/UL (ref 0–0.6)
EOSINOPHILS RELATIVE PERCENT: 0 %
GFR AFRICAN AMERICAN: >60
GFR NON-AFRICAN AMERICAN: >60
GLOBULIN: 2.5 G/DL
GLUCOSE BLD-MCNC: 143 MG/DL (ref 70–99)
HCT VFR BLD CALC: 34.8 % (ref 40.5–52.5)
HDLC SERPL-MCNC: 40 MG/DL (ref 40–60)
HEMOGLOBIN: 11.2 G/DL (ref 13.5–17.5)
HEPATITIS C ANTIBODY INTERPRETATION: NORMAL
LDL CHOLESTEROL CALCULATED: 96 MG/DL
LYMPHOCYTES ABSOLUTE: 1.8 K/UL (ref 1–5.1)
LYMPHOCYTES RELATIVE PERCENT: 34.4 %
MCH RBC QN AUTO: 26.1 PG (ref 26–34)
MCHC RBC AUTO-ENTMCNC: 32.1 G/DL (ref 31–36)
MCV RBC AUTO: 81.3 FL (ref 80–100)
MICROALBUMIN UR-MCNC: <1.2 MG/DL
MICROALBUMIN/CREAT UR-RTO: NORMAL MG/G (ref 0–30)
MONOCYTES ABSOLUTE: 0.5 K/UL (ref 0–1.3)
MONOCYTES RELATIVE PERCENT: 9.7 %
NEUTROPHILS ABSOLUTE: 2.9 K/UL (ref 1.7–7.7)
NEUTROPHILS RELATIVE PERCENT: 55.2 %
PDW BLD-RTO: 17.3 % (ref 12.4–15.4)
PLATELET # BLD: 304 K/UL (ref 135–450)
PMV BLD AUTO: 9.2 FL (ref 5–10.5)
POTASSIUM SERPL-SCNC: 5.2 MMOL/L (ref 3.5–5.1)
RBC # BLD: 4.27 M/UL (ref 4.2–5.9)
SODIUM BLD-SCNC: 135 MMOL/L (ref 136–145)
TOTAL PROTEIN: 6.8 G/DL (ref 6.4–8.2)
TRIGL SERPL-MCNC: 278 MG/DL (ref 0–150)
TSH SERPL DL<=0.05 MIU/L-ACNC: 4 UIU/ML (ref 0.27–4.2)
VLDLC SERPL CALC-MCNC: 56 MG/DL
WBC # BLD: 5.2 K/UL (ref 4–11)

## 2021-01-22 PROCEDURE — 3017F COLORECTAL CA SCREEN DOC REV: CPT | Performed by: HOSPITALIST

## 2021-01-22 PROCEDURE — 4040F PNEUMOC VAC/ADMIN/RCVD: CPT | Performed by: HOSPITALIST

## 2021-01-22 PROCEDURE — G8484 FLU IMMUNIZE NO ADMIN: HCPCS | Performed by: HOSPITALIST

## 2021-01-22 PROCEDURE — G0439 PPPS, SUBSEQ VISIT: HCPCS | Performed by: HOSPITALIST

## 2021-01-22 PROCEDURE — 1123F ACP DISCUSS/DSCN MKR DOCD: CPT | Performed by: HOSPITALIST

## 2021-01-22 PROCEDURE — 3046F HEMOGLOBIN A1C LEVEL >9.0%: CPT | Performed by: HOSPITALIST

## 2021-01-22 ASSESSMENT — PATIENT HEALTH QUESTIONNAIRE - PHQ9
SUM OF ALL RESPONSES TO PHQ9 QUESTIONS 1 & 2: 0
SUM OF ALL RESPONSES TO PHQ QUESTIONS 1-9: 0
2. FEELING DOWN, DEPRESSED OR HOPELESS: 0

## 2021-01-22 ASSESSMENT — LIFESTYLE VARIABLES: HOW OFTEN DO YOU HAVE A DRINK CONTAINING ALCOHOL: 0

## 2021-01-22 NOTE — TELEPHONE ENCOUNTER
Had an appt 01/22/2021. Pt wife states Dr. Maude Adams was supposed to increase pt med Metformin but pharmacy was not updated during visit and per pt Dr. Maude Adams did not specify change in medication. Pharmacy has been changed to [facility].     Please advise

## 2021-01-22 NOTE — PROGRESS NOTES
Medicare Annual Wellness Visit  Name: Dorothy Medina Date: 2021   MRN: <[id number]> Sex: Male   Age: 76 y.o. Ethnicity: Non-/Non    : 1945 Race: Micki Zheng is here for Medicare AWV    Screenings for behavioral, psychosocial and functional/safety risks, and cognitive dysfunction are all negative except as indicated below. These results, as well as other patient data from the [address] form, are documented in Flowsheets linked to this Encounter. Allergies   Allergen Reactions    Codeine     Sulfa Antibiotics Nausea And Vomiting         Prior to Visit Medications    Medication Sig Taking?  Authorizing Provider   metFORMIN (GLUCOPHAGE) 500 MG tablet Take 1 tablet by mouth 2 times daily (with meals) Yes Yang Johnson MD   ramipril (ALTACE) 5 MG capsule TAKE 1 CAPSULE EVERY DAY Yes Trey Cai MD   metoprolol tartrate (LOPRESSOR) 25 MG tablet TAKE 1/2 TABLET TWICE DAILY Yes Trey Cai MD   atorvastatin (LIPITOR) 80 MG tablet TAKE 1 TABLET EVERY DAY Yes Trey Cai MD   nitroGLYCERIN (NITROSTAT) 0.4 MG SL tablet Place 1 tablet under the tongue every 5 minutes as needed (PRN) Yes Trey Cai MD   esomeprazole (NEXIUM) 20 MG capsule Take 20 mg by mouth every morning (before breakfast) Yes Historical Provider, MD   aspirin 81 MG EC tablet 81 mg Take 2 tablets daily Yes Historical Provider, MD         Past Medical History:   Diagnosis Date    CAD (coronary artery disease)     Hyperlipidemia     Hypertension     Keratosis, seborrheic     Lower GI bleed     Myocardial infarction ([address]     Sensorineural hearing loss of right ear     Shingles        Past Surgical History:   Procedure Laterality Date    CORONARY ANGIOPLASTY WITH STENT PLACEMENT      UPPER GASTROINTESTINAL ENDOSCOPY      received clipping for ania rice tear         Family History   Problem Relation Age of Onset    Heart Disease Mother     Heart Failure Mother  Hypertension Mother     Diabetes Father     Heart Failure Father     Cancer Brother     Heart Disease Brother     Heart Disease Maternal Grandfather     Cancer Brother     Heart Disease Brother     Anemia Neg Hx     Arrhythmia Neg Hx     Asthma Neg Hx     Clotting Disorder Neg Hx     Fainting Neg Hx     Heart Attack Neg Hx     Heart Surgery Neg Hx     High Cholesterol Neg Hx     Pacemaker Neg Hx        CareTeam (Including outside providers/suppliers regularly involved in providing care):   Patient Care Team:  Jolene Rahman MD as PCP - General (Internal Medicine)  Jolene Rahman MD as PCP - [facility] Empaneled Provider    Wt Readings from Last 3 Encounters:   01/22/21 203 lb (92.1 kg)   11/24/20 187 lb (84.8 kg)   09/15/20 202 lb (91.6 kg)     Vitals:    01/22/21 0921 01/22/21 0931   BP: (!) 166/78 (!) 177/80   Temp: 98 °F (36.7 °C)    Weight: 203 lb (92.1 kg)    Height: 6' 1\" (1.854 m)      Body mass index is 26.78 kg/m². Based upon direct observation of the patient, evaluation of cognition reveals recent and remote memory intact.     General Appearance: alert and oriented to person, place and time, well developed and well- nourished, in no acute distress  Skin: warm and dry, no rash or erythema  Head: normocephalic and atraumatic  Eyes: pupils equal, round, and reactive to light, extraocular eye movements intact, conjunctivae normal  ENT: tympanic membrane, external ear and ear canal normal bilaterally, nose without deformity, nasal mucosa and turbinates normal without polyps  Neck: supple and non-tender without mass, no thyromegaly or thyroid nodules, no cervical lymphadenopathy  Pulmonary/Chest: clear to auscultation bilaterally- no wheezes, rales or rhonchi, normal air movement, no respiratory distress  Cardiovascular: normal rate, regular rhythm, normal S1 and S2, no murmurs, rubs, clicks, or gallops, distal pulses intact, no carotid bruits Abdomen: soft, non-tender, non-distended, normal bowel sounds, no masses or organomegaly  Extremities: no cyanosis, clubbing or edema  Musculoskeletal: normal range of motion, no joint swelling, deformity or tenderness  Neurologic: reflexes normal and symmetric, no cranial nerve deficit, gait, coordination and speech normal    Patient's complete Health Risk Assessment and screening values have been reviewed and are found in Flowsheets. The following problems were reviewed today and where indicated follow up appointments were made and/or referrals ordered. Positive Risk Factor Screenings with Interventions:            General Health and ACP:  General  In general, how would you say your health is?: Good  In the past 7 days, have you experienced any of the following?  New or Increased Pain, New or Increased Fatigue, Loneliness, Social Isolation, Stress or Anger?: None of These  Do you get the social and emotional support that you need?: Yes  Do you have a Living Will?: (!) No  Advance Directives     Power of [facility] Will ACP-Advance Directive ACP-Power of     Not on File Not on File Not on File Not on File      General Health Risk Interventions:  · No Living Will: Advance Care Planning addressed with patient today   · Appropriate paperwork was provided    Health Habits/Nutrition:  Health Habits/Nutrition  Do you exercise for at least 20 minutes 2-3 times per week?: Yes  Have you lost any weight without trying in the past 3 months?: No  Do you eat fewer than 2 meals per day?: No  Have you seen a dentist within the past year?: (!) No  Body mass index: (!) 26.78  Health Habits/Nutrition Interventions:  · Dental exam overdue:  patient encouraged to make appointment with his/her dentist     Hearing/Vision:  No exam data present  Hearing/Vision  Do you or your family notice any trouble with your hearing?: (!) Yes Do you have difficulty driving, watching TV, or doing any of your daily activities because of your eyesight?: No  Have you had an eye exam within the past year?: (!) No  Hearing/Vision Interventions:  · Hearing concerns:  patient declines any further evaluation/treatment for hearing issues   Had traumatic hearing loss in his R ear. Hear very well with his left one      Personalized Preventive Plan   Current Health Maintenance Status  Immunization History   Administered Date(s) Administered    Pneumococcal Conjugate 13-valent ([id number]) 10/01/2015    Pneumococcal Polysaccharide ([id number]) 04/26/2018        Health Maintenance   Topic Date Due    Hepatitis C screen  1945    Diabetic foot exam  03/22/1955    Diabetic retinal exam  03/22/1955    DTaP/Tdap/Td vaccine (1 - Tdap) 03/22/1964    A1C test (Diabetic or Prediabetic)  04/27/2019    Lipid screen  04/27/2019    Potassium monitoring  04/27/2019    Creatinine monitoring  04/27/2019    Annual Wellness Visit (AWV)  06/19/2019    Flu vaccine (1) 09/01/2020    Colon cancer screen colonoscopy  06/17/2025    Shingles Vaccine  Completed    Pneumococcal 65+ years Vaccine  Completed    Hepatitis A vaccine  Aged Out    Hepatitis B vaccine  Aged Out    Hib vaccine  Aged Out    Meningococcal (ACWY) vaccine  Aged Out     Recommendations for [employer] Due: see orders and patient instructions/AVS.  . Recommended screening schedule for the next 5-10 years is provided to the patient in written form: see Patient Jeff Arshad was seen today for medicare awv. Diagnoses and all orders for this visit:    Routine general medical examination at a health care facility    Type 2 diabetes mellitus without complication, without long-term current use of insulin ([facility] [address]  -     Hemoglobin A1C; Future  -     Microalbumin / Creatinine Urine Ratio; Future  -     Lipid Panel; Future  -     Comprehensive Metabolic Panel;  Future -     metFORMIN (GLUCOPHAGE) 500 MG tablet; Take 1 tablet by mouth 2 times daily (with meals)    Essential hypertension  -     Lipid Panel; Future  -     TSH without Reflex; Future  -     CBC Auto Differential; Future  -     Comprehensive Metabolic Panel; Future  -     Continue current management  -     The patient reports to me that his systolic blood pressure usually under 140 mmHg when checked at home. Dyslipidemia  -     Lipid Panel; Future  -     Continue atorvastatin 80 mg nightly    Coronary artery disease due to lipid rich plaque        -     Stable; follows up with Dr. Dhaval Varghese, cardiology  No recent episodes of chest pain. Continue current medications, including ramipril 5 mg daily metoprolol tartrate 25 mg daily aspirin 81 mg daily and atorvastatin 80 mg nightly    Encounter for HCV screening test for low risk patient  -     HEPATITIS C ANTIBODY;  Future      Follow-up in 6 months    Jose Lopes MD

## 2021-01-22 NOTE — PATIENT INSTRUCTIONS
Personalized Preventive Plan for Pat Burkitt - 1/22/2021  Medicare offers a range of preventive health benefits. Some of the tests and screenings are paid in full while other may be subject to a deductible, co-insurance, and/or copay. Some of these benefits include a comprehensive review of your medical history including lifestyle, illnesses that may run in your family, and various assessments and screenings as appropriate. After reviewing your medical record and screening and assessments performed today your provider may have ordered immunizations, labs, imaging, and/or referrals for you. A list of these orders (if applicable) as well as your Preventive Care list are included within your After Visit Summary for your review. Other Preventive Recommendations:    · A preventive eye exam performed by an eye specialist is recommended every 1-2 years to screen for glaucoma; cataracts, macular degeneration, and other eye disorders. · A preventive dental visit is recommended every 6 months. · Try to get at least 150 minutes of exercise per week or 10,000 steps per day on a pedometer . · Order or download the FREE \"Exercise & Physical Activity: Your Everyday Guide\" from [employer]. Call 1-379.630.3249 or search [employer] online. · You need 9114-9466 mg of calcium and 3257-0113 IU of vitamin D per day. It is possible to meet your calcium requirement with diet alone, but a vitamin D supplement is usually necessary to meet this goal.  · When exposed to the sun, use a sunscreen that protects against both UVA and UVB radiation with an SPF of 30 or greater. Reapply every 2 to 3 hours or after sweating, drying off with a towel, or swimming. · Always wear a seat belt when traveling in a car. Always wear a helmet when riding a bicycle or motorcycle.

## 2021-01-23 ENCOUNTER — TELEPHONE (OUTPATIENT)
Dept: INTERNAL MEDICINE CLINIC | Age: 76
End: 2021-01-23

## 2021-01-23 DIAGNOSIS — E11.9 TYPE 2 DIABETES MELLITUS WITHOUT COMPLICATION, WITHOUT LONG-TERM CURRENT USE OF INSULIN (HCC): ICD-10-CM

## 2021-01-23 DIAGNOSIS — D64.9 NORMOCYTIC ANEMIA: Primary | ICD-10-CM

## 2021-01-23 LAB
ESTIMATED AVERAGE GLUCOSE: 194.4 MG/DL
HBA1C MFR BLD: 8.4 %

## 2021-01-25 DIAGNOSIS — D64.9 NORMOCYTIC ANEMIA: ICD-10-CM

## 2021-01-25 LAB
FERRITIN: 12.1 NG/ML (ref 30–400)
FOLATE: >20 NG/ML (ref 4.78–24.2)
IRON SATURATION: 9 % (ref 20–50)
IRON: 37 UG/DL (ref 59–158)
TOTAL IRON BINDING CAPACITY: 423 UG/DL (ref 260–445)
VITAMIN B-12: 612 PG/ML (ref 211–911)

## 2021-01-26 DIAGNOSIS — D50.8 OTHER IRON DEFICIENCY ANEMIA: Primary | ICD-10-CM

## 2021-01-26 RX ORDER — FERROUS SULFATE 325(65) MG
325 TABLET ORAL DAILY
Qty: 30 TABLET | Refills: 3 | Status: SHIPPED | OUTPATIENT
Start: 2021-01-26 | End: 2021-02-25 | Stop reason: SDUPTHER

## 2021-01-26 NOTE — PROGRESS NOTES
The patient was notified that he has iron deficiency anemia. Again, I encouraged him to make an appointment with his gastroenterologist, Bernardo Daugherty MD, for further evaluation and treatment. At this point, the patient is not interested in undergoing colonoscopy/EGD. Wants to proceed with iron sulfate supplementation for couple of months first.  Iron sulfate 325 mg orally daily has been prescribed.

## 2021-02-10 NOTE — TELEPHONE ENCOUNTER
Requested Prescriptions     Pending Prescriptions Disp Refills    atorvastatin (LIPITOR) 80 MG tablet [Pharmacy Med Name: ATORVASTATIN CALCIUM 80 MG Tablet] 90 tablet 3     Sig: TAKE 1 TABLET EVERY DAY    metoprolol tartrate (LOPRESSOR) 25 MG tablet [Pharmacy Med Name: METOPROLOL TARTRATE 25 MG Tablet] 90 tablet 3     Sig: TAKE 1/2 TABLET TWICE DAILY                  Last Office Visit: 9/15/2020     Next Office Visit: 3/19/2021

## 2021-02-12 RX ORDER — ATORVASTATIN CALCIUM 80 MG/1
TABLET, FILM COATED ORAL
Qty: 90 TABLET | Refills: 3 | Status: SHIPPED | OUTPATIENT
Start: 2021-02-12 | End: 2022-01-28

## 2021-02-25 ENCOUNTER — TELEPHONE (OUTPATIENT)
Dept: INTERNAL MEDICINE CLINIC | Age: 76
End: 2021-02-25

## 2021-02-25 DIAGNOSIS — D50.8 OTHER IRON DEFICIENCY ANEMIA: ICD-10-CM

## 2021-02-25 RX ORDER — FERROUS SULFATE 325(65) MG
325 TABLET ORAL DAILY
Qty: 30 TABLET | Refills: 0 | Status: SHIPPED | OUTPATIENT
Start: 2021-02-25 | End: 2021-04-21

## 2021-02-25 NOTE — TELEPHONE ENCOUNTER
Patients wife called Robyn Nava called since she hadn't received a call yet. Please call and advise.

## 2021-02-25 NOTE — TELEPHONE ENCOUNTER
Patient called wanting to know if you still want him to take the ferrous sulfate (STEPHANIE-VERO) 325 (65 Fe) MG tablet [[id number] ? He states he needs a refill but before he gets it he wants to know if you need him to get blood work or just stay on the medication? Please call to advise.

## 2021-03-25 ENCOUNTER — TELEPHONE (OUTPATIENT)
Dept: INTERNAL MEDICINE CLINIC | Age: 76
End: 2021-03-25

## 2021-03-25 DIAGNOSIS — D50.8 OTHER IRON DEFICIENCY ANEMIA: Primary | ICD-10-CM

## 2021-03-25 NOTE — TELEPHONE ENCOUNTER
Pt called and wanting to know if it is time to repeat his labs since he has been taking Iron tablets x 1 month. Per note repeat labs in 1 month. Orders are placed in Epic and Patient advised.

## 2021-03-26 DIAGNOSIS — D50.8 OTHER IRON DEFICIENCY ANEMIA: ICD-10-CM

## 2021-03-26 LAB
FERRITIN: 26 NG/ML (ref 30–400)
FOLATE: >20 NG/ML (ref 4.78–24.2)
IRON SATURATION: 20 % (ref 20–50)
IRON: 63 UG/DL (ref 59–158)
TOTAL IRON BINDING CAPACITY: 316 UG/DL (ref 260–445)
VITAMIN B-12: 526 PG/ML (ref 211–911)

## 2021-04-13 ENCOUNTER — OFFICE VISIT (OUTPATIENT)
Dept: CARDIOLOGY CLINIC | Age: 76
End: 2021-04-13
Payer: MEDICARE

## 2021-04-13 VITALS
HEART RATE: 69 BPM | BODY MASS INDEX: 25.63 KG/M2 | WEIGHT: 193.4 LBS | HEIGHT: 73 IN | SYSTOLIC BLOOD PRESSURE: 134 MMHG | DIASTOLIC BLOOD PRESSURE: 74 MMHG

## 2021-04-13 DIAGNOSIS — I25.10 CAD IN NATIVE ARTERY: Primary | ICD-10-CM

## 2021-04-13 DIAGNOSIS — I10 ESSENTIAL HYPERTENSION: ICD-10-CM

## 2021-04-13 DIAGNOSIS — I25.2 MI, OLD: ICD-10-CM

## 2021-04-13 PROCEDURE — 4040F PNEUMOC VAC/ADMIN/RCVD: CPT | Performed by: INTERNAL MEDICINE

## 2021-04-13 PROCEDURE — G8417 CALC BMI ABV UP PARAM F/U: HCPCS | Performed by: INTERNAL MEDICINE

## 2021-04-13 PROCEDURE — G8427 DOCREV CUR MEDS BY ELIG CLIN: HCPCS | Performed by: INTERNAL MEDICINE

## 2021-04-13 PROCEDURE — 99213 OFFICE O/P EST LOW 20 MIN: CPT | Performed by: INTERNAL MEDICINE

## 2021-04-13 PROCEDURE — 1123F ACP DISCUSS/DSCN MKR DOCD: CPT | Performed by: INTERNAL MEDICINE

## 2021-04-13 PROCEDURE — 1036F TOBACCO NON-USER: CPT | Performed by: INTERNAL MEDICINE

## 2021-04-13 NOTE — PROGRESS NOTES
Subjective:      Patient ID: Mo Saleem is a 68 y.o. male. HPI Shyann Coughlin is here today for follow up CAD/HTN/MI. Continues to walk 5x per wk. Very active. No angina. No sob. No pnd or orthopnea. No edema. Wt down. No tachy/syncope. BP good. Walking 5 miles a day.   Feeling good       Past Medical History:   Diagnosis Date    CAD (coronary artery disease)     Hyperlipidemia     Hypertension     Keratosis, seborrheic     Lower GI bleed     Myocardial infarction (HCC)     Sensorineural hearing loss of right ear     Shingles      Past Surgical History:   Procedure Laterality Date    CORONARY ANGIOPLASTY WITH STENT PLACEMENT      UPPER GASTROINTESTINAL ENDOSCOPY      received clipping for ania rice tear       Social History     Socioeconomic History    Marital status:      Spouse name: Not on file    Number of children: 2    Years of education: Not on file    Highest education level: Not on file   Occupational History    Occupation:      Comment: retired   Social Needs    Financial resource strain: Not on file    Food insecurity     Worry: Not on file     Inability: Not on file   [employer] needs     Medical: Not on file     Non-medical: Not on file   Tobacco Use    Smoking status: Never Smoker    Smokeless tobacco: Former User   Substance and Sexual Activity    Alcohol use: No    Drug use: No    Sexual activity: Not on file   Lifestyle    Physical activity     Days per week: Not on file     Minutes per session: Not on file    Stress: Not on file   Relationships    Social connections     Talks on phone: Not on file     Gets together: Not on file     Attends Evangelical service: Not on file     Active member of club or organization: Not on file     Attends meetings of clubs or organizations: Not on file     Relationship status: Not on file    Intimate partner violence     Fear of current or ex partner: Not on file     Emotionally abused: Not on file Physically abused: Not on file     Forced sexual activity: Not on file   Other Topics Concern    Not on file   Social History Narrative    Not on file     FH reviewed, denies FH cardiac issues    Vitals:    04/13/21 1335   BP: 134/74   Pulse: 69       Wt 202    Review of Systems   Constitutional: Negative for activity change and fatigue. Respiratory: Negative for apnea, cough, choking, chest tightness and shortness of breath. Cardiovascular: Negative for chest pain, palpitations and leg swelling. No PND or orthopnea. No tachycardia. Gastrointestinal: Negative for abdominal distention. Musculoskeletal: Negative for myalgias. Neurological: Negative for dizziness and syncope. Psychiatric/Behavioral: Negative for behavioral problems, confusion and agitation. All other systems reviewed negative as done    Objective:   Physical Exam   Constitutional: He is oriented to person, place, and time. He appears well-developed and well-nourished. No distress. HENT:   Head: Normocephalic and atraumatic. Eyes: Conjunctivae and EOM are normal. Right eye exhibits no discharge. Left eye exhibits no discharge. Neck: Normal range of motion. Neck supple. No JVD present. Cardiovascular: Normal rate, regular rhythm, S1 normal and S2 normal.  Exam reveals no gallop. No murmur heard. Pulses:       Radial pulses are 2+ on the right side, and 2+ on the left side. Pulmonary/Chest: Breath sounds normal. No respiratory distress. He has no wheezes. He has no rales. Abdominal: Soft. Bowel sounds are normal. No tenderness. Musculoskeletal: Normal range of motion. He exhibits no edema. Neurological: He is alert and oriented to person, place, and time. Skin: Skin is warm and dry. Psychiatric: He has a normal mood and affect. His behavior is normal. Thought content normal.       Assessment:       Diagnosis Orders   1. CAD in native artery     2.  Essential hypertension     3. MI, old               Plan: CV stable. No chest pain. Continues to exercise. BP is good. No exertional sx. Reviewed previous cardiac records and testing including myoview 11/14. Continue to monitor. Follow up 6 months. Lipids per PCP. Continues wishes to be  followed clinically.

## 2021-04-14 ENCOUNTER — IMMUNIZATION (OUTPATIENT)
Dept: FAMILY MEDICINE CLINIC | Age: 76
End: 2021-04-14
Payer: MEDICARE

## 2021-04-14 PROCEDURE — 0001A COVID-19, PFIZER VACCINE 30MCG/0.3ML DOSE: CPT | Performed by: FAMILY MEDICINE

## 2021-04-14 PROCEDURE — 91300 COVID-19, PFIZER VACCINE 30MCG/0.3ML DOSE: CPT | Performed by: FAMILY MEDICINE

## 2021-04-21 DIAGNOSIS — D50.8 OTHER IRON DEFICIENCY ANEMIA: ICD-10-CM

## 2021-04-21 RX ORDER — FERROUS SULFATE 325(65) MG
TABLET ORAL
Qty: 30 TABLET | Refills: 2 | Status: SHIPPED | OUTPATIENT
Start: 2021-04-21 | End: 2021-05-12 | Stop reason: SDUPTHER

## 2021-04-21 RX ORDER — RAMIPRIL 5 MG/1
CAPSULE ORAL
Qty: 90 CAPSULE | Refills: 3 | Status: SHIPPED | OUTPATIENT
Start: 2021-04-21 | End: 2022-04-15 | Stop reason: SDUPTHER

## 2021-04-21 NOTE — TELEPHONE ENCOUNTER
Requested Prescriptions     Pending Prescriptions Disp Refills    ramipril (ALTACE) 5 MG capsule [Pharmacy Med Name: RAMIPRIL 5 MG Capsule] 90 capsule 3     Sig: TAKE 1 CAPSULE EVERY DAY                Last Office Visit: 4/13/2021     Next Office Visit: 10/12/2021

## 2021-05-05 ENCOUNTER — IMMUNIZATION (OUTPATIENT)
Dept: FAMILY MEDICINE CLINIC | Age: 76
End: 2021-05-05
Payer: MEDICARE

## 2021-05-05 PROCEDURE — 0002A COVID-19, PFIZER VACCINE 30MCG/0.3ML DOSE: CPT | Performed by: FAMILY MEDICINE

## 2021-05-05 PROCEDURE — 91300 COVID-19, PFIZER VACCINE 30MCG/0.3ML DOSE: CPT | Performed by: FAMILY MEDICINE

## 2021-05-11 ENCOUNTER — TELEPHONE (OUTPATIENT)
Dept: INTERNAL MEDICINE CLINIC | Age: 76
End: 2021-05-11

## 2021-05-11 DIAGNOSIS — D50.8 OTHER IRON DEFICIENCY ANEMIA: ICD-10-CM

## 2021-05-11 NOTE — TELEPHONE ENCOUNTER
Patient wants to know if he should still take the ferrous sulfate (IRON 325) 325 (65 Fe) MG tablet [[id number], if so he will need a refill or does he need a blood test?     Please to advise.

## 2021-05-12 RX ORDER — FERROUS SULFATE 325(65) MG
TABLET ORAL
Qty: 30 TABLET | Refills: 2 | Status: SHIPPED | OUTPATIENT
Start: 2021-05-12

## 2021-09-27 ENCOUNTER — OFFICE VISIT (OUTPATIENT)
Dept: DERMATOLOGY | Age: 76
End: 2021-09-27
Payer: MEDICARE

## 2021-09-27 VITALS — TEMPERATURE: 98.1 F

## 2021-09-27 DIAGNOSIS — Z86.006 HISTORY OF MELANOMA IN SITU: ICD-10-CM

## 2021-09-27 DIAGNOSIS — L57.0 ACTINIC KERATOSIS: ICD-10-CM

## 2021-09-27 DIAGNOSIS — L72.0 EPIDERMOID CYST: ICD-10-CM

## 2021-09-27 DIAGNOSIS — L82.1 SK (SEBORRHEIC KERATOSIS): Primary | ICD-10-CM

## 2021-09-27 PROCEDURE — G8417 CALC BMI ABV UP PARAM F/U: HCPCS | Performed by: DERMATOLOGY

## 2021-09-27 PROCEDURE — 1036F TOBACCO NON-USER: CPT | Performed by: DERMATOLOGY

## 2021-09-27 PROCEDURE — 4040F PNEUMOC VAC/ADMIN/RCVD: CPT | Performed by: DERMATOLOGY

## 2021-09-27 PROCEDURE — 1123F ACP DISCUSS/DSCN MKR DOCD: CPT | Performed by: DERMATOLOGY

## 2021-09-27 PROCEDURE — G8427 DOCREV CUR MEDS BY ELIG CLIN: HCPCS | Performed by: DERMATOLOGY

## 2021-09-27 PROCEDURE — 99213 OFFICE O/P EST LOW 20 MIN: CPT | Performed by: DERMATOLOGY

## 2021-09-27 NOTE — PROGRESS NOTES
[facility]  Jonh Lopes MD  [phone]      Radha Chavez  1945    68 y.o. male     Date of Visit: 9/27/2021    Chief Complaint: skin lesions    History of Present Illness:    1. He complains of multiple asymptomatic growths on the back. 2.  He also has an asymptomatic lesion on the right upper chest.    3.  He has a painless scaly lesion of the right lower cheek. 4.  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       Review of Systems:  Gen: Feels well, good sense of health. Past Medical History, Family History, Surgical History, Medications and Allergies reviewed.     Past Medical History:   Diagnosis Date    CAD (coronary artery disease)     Hyperlipidemia     Hypertension     Keratosis, seborrheic     Lower GI bleed     Myocardial infarction (HCC)     Sensorineural hearing loss of right ear     Shingles      Past Surgical History:   Procedure Laterality Date    CORONARY ANGIOPLASTY WITH STENT PLACEMENT      UPPER GASTROINTESTINAL ENDOSCOPY      received clipping for ania rice tear       Allergies   Allergen Reactions    Codeine     Sulfa Antibiotics Nausea And Vomiting     Outpatient Medications Marked as Taking for the 9/27/21 encounter (Office Visit) with Bailey Velasquez MD   Medication Sig Dispense Refill    ferrous sulfate (IRON 325) 325 (65 Fe) MG tablet TAKE 1 TABLET BY MOUTH EVERY DAY 30 tablet 2    ramipril (ALTACE) 5 MG capsule TAKE 1 CAPSULE EVERY DAY 90 capsule 3    atorvastatin (LIPITOR) 80 MG tablet TAKE 1 TABLET EVERY DAY 90 tablet 3    metoprolol tartrate (LOPRESSOR) 25 MG tablet TAKE 1/2 TABLET TWICE DAILY 90 tablet 3    metFORMIN (GLUCOPHAGE) 1000 MG tablet Take 1 tablet by mouth 2 times daily (with meals) 180 tablet 3    nitroGLYCERIN (NITROSTAT) 0.4 MG SL tablet Place 1 tablet under the tongue every 5 minutes as needed (PRN) 25 tablet 5    esomeprazole (NEXIUM) 20 MG capsule Take 20 mg by mouth every morning (before breakfast)      aspirin 81 MG EC tablet 81 mg Take 2 tablets daily           Physical Examination       The following were examined and determined to be normal: Psych/Neuro, Scalp/hair, Conjunctivae/eyelids, Gums/teeth/lips, Neck, Breast/axilla/chest, Abdomen, Back, RUE, LUE, RLE, LLE and Nails/digits. The following were examined and determined to be abnormal: Head/face. Well appearing. 1.  Dorsum of the hands - few stuck on appearing verrucous grey papules. Upper chest and back with stuck-on appearing tan-brown verrucous papules and plaques. 2.  Right upper chest - oval shaped whitish nodule with overlying punctum. 3.  Right lower cheek - small skin colored scaly macule. 4.  Left lower cheek - well healed linear surgical scar. Assessment and Plan     1. SK (seborrheic keratosis) - multiple    Reassurance. 2. Epidermoid cyst - small and asymptomatic    Observe. 3. Actinic keratosis - few, small and asymptomatic    Observe. Counseling regarding sun protective behaviors was performed including sun avoidance, hats and use of at least SPF 30 sunscreen. 4. History of melanoma in situ - clear    Sun protective behaviors, including use of at least SPF 30 sunscreen, and self skin examinations were encouraged. Call for any new or concerning lesions. Return in about 1 year (around 9/27/2022).     --Vilma Hernández MD

## 2021-10-12 ENCOUNTER — OFFICE VISIT (OUTPATIENT)
Dept: CARDIOLOGY CLINIC | Age: 76
End: 2021-10-12
Payer: MEDICARE

## 2021-10-12 VITALS
DIASTOLIC BLOOD PRESSURE: 70 MMHG | WEIGHT: 196 LBS | HEART RATE: 72 BPM | SYSTOLIC BLOOD PRESSURE: 120 MMHG | BODY MASS INDEX: 25.86 KG/M2

## 2021-10-12 DIAGNOSIS — I25.10 CAD IN NATIVE ARTERY: Primary | ICD-10-CM

## 2021-10-12 DIAGNOSIS — I10 PRIMARY HYPERTENSION: ICD-10-CM

## 2021-10-12 DIAGNOSIS — I25.2 MI, OLD: ICD-10-CM

## 2021-10-12 PROCEDURE — 1123F ACP DISCUSS/DSCN MKR DOCD: CPT | Performed by: INTERNAL MEDICINE

## 2021-10-12 PROCEDURE — 99214 OFFICE O/P EST MOD 30 MIN: CPT | Performed by: INTERNAL MEDICINE

## 2021-10-12 PROCEDURE — G8427 DOCREV CUR MEDS BY ELIG CLIN: HCPCS | Performed by: INTERNAL MEDICINE

## 2021-10-12 PROCEDURE — G8417 CALC BMI ABV UP PARAM F/U: HCPCS | Performed by: INTERNAL MEDICINE

## 2021-10-12 PROCEDURE — G8484 FLU IMMUNIZE NO ADMIN: HCPCS | Performed by: INTERNAL MEDICINE

## 2021-10-12 PROCEDURE — 1036F TOBACCO NON-USER: CPT | Performed by: INTERNAL MEDICINE

## 2021-10-12 PROCEDURE — 4040F PNEUMOC VAC/ADMIN/RCVD: CPT | Performed by: INTERNAL MEDICINE

## 2021-10-12 NOTE — PROGRESS NOTES
Subjective:      Patient ID: Melanie Irving is a 68 y.o. male. HPI Darlene Rain is here today for follow up CAD/HTN/MI. Continues to walk 5x per wk. Very active. No angina. No sob. No pnd or orthopnea. No edema. Wt down. No tachy/syncope. BP good. Walking 5 miles a day. Feeling good       Past Medical History:   Diagnosis Date    CAD (coronary artery disease)     Hyperlipidemia     Hypertension     Keratosis, seborrheic     Lower GI bleed     Myocardial infarction (HCC)     Sensorineural hearing loss of right ear     Shingles      Past Surgical History:   Procedure Laterality Date    CORONARY ANGIOPLASTY WITH STENT PLACEMENT      UPPER GASTROINTESTINAL ENDOSCOPY      received clipping for ania rice tear       Social History     Socioeconomic History    Marital status:      Spouse name: Not on file    Number of children: 2    Years of education: Not on file    Highest education level: Not on file   Occupational History    Occupation:      Comment: retired   Tobacco Use    Smoking status: Never Smoker    Smokeless tobacco: Former User   Substance and Sexual Activity    Alcohol use: No    Drug use: No    Sexual activity: Not on file   Other Topics Concern    Not on file   Social History Narrative    Not on file     Social Determinants of Health     Financial Resource Strain:     Difficulty of Paying Living Expenses:    Food Insecurity:     Worried About Running Out of Food in the Last Year:     Ran Out of Food in the Last Year:    Transportation Needs:     Lack of Transportation (Medical):      Lack of Transportation (Non-Medical):    Physical Activity:     Days of Exercise per Week:     Minutes of Exercise per Session:    Stress:     Feeling of Stress :    Social Connections:     Frequency of Communication with Friends and Family:     Frequency of Social Gatherings with Friends and Family:     Attends Anglican Services:     Active Member of Clubs or Organizations:     Attends Club or Organization Meetings:     Marital Status:    Intimate Partner Violence:     Fear of Current or Ex-Partner:     Emotionally Abused:     Physically Abused:     Sexually Abused:      FH reviewed, denies FH cardiac issues    Vitals:    10/12/21 1314   BP: 120/70   Pulse: 72       Wt 196    Review of Systems   Constitutional: Negative for activity change and fatigue. Respiratory: Negative for apnea, cough, choking, chest tightness and shortness of breath. Cardiovascular: Negative for chest pain, palpitations and leg swelling. No PND or orthopnea. No tachycardia. Gastrointestinal: Negative for abdominal distention. Musculoskeletal: Negative for myalgias. Neurological: Negative for dizziness and syncope. Psychiatric/Behavioral: Negative for behavioral problems, confusion and agitation. All other systems reviewed negative as done    Objective:   Physical Exam   Constitutional: He is oriented to person, place, and time. He appears well-developed and well-nourished. No distress. HENT:   Head: Normocephalic and atraumatic. Eyes: Conjunctivae and EOM are normal. Right eye exhibits no discharge. Left eye exhibits no discharge. Neck: Normal range of motion. Neck supple. No JVD present. Cardiovascular: Normal rate, regular rhythm, S1 normal and S2 normal.  Exam reveals no gallop. No murmur heard. Pulses:       Radial pulses are 2+ on the right side, and 2+ on the left side. Pulmonary/Chest: Breath sounds normal. No respiratory distress. He has no wheezes. He has no rales. Abdominal: Soft. Bowel sounds are normal. No tenderness. Musculoskeletal: Normal range of motion. He exhibits no edema. Neurological: He is alert and oriented to person, place, and time. Skin: Skin is warm and dry. Psychiatric: He has a normal mood and affect. His behavior is normal. Thought content normal.       Assessment:       Diagnosis Orders   1.  CAD in native artery 2. Primary hypertension     3. MI, old             Plan:      CV stable. No chest pain. Continues to exercise. BP is good. No exertional sx. Will continue ASA/ramipril/lopressor/lipitor for CAD/HTN. Reviewed previous cardiac records and testing including myoview 11/14. Continue to monitor. Follow up 6 months. Lipids per PCP. Continues wishes to be  followed clinically.

## 2021-12-28 ENCOUNTER — TELEPHONE (OUTPATIENT)
Dept: CARDIOLOGY CLINIC | Age: 76
End: 2021-12-28

## 2021-12-29 DIAGNOSIS — E11.9 TYPE 2 DIABETES MELLITUS WITHOUT COMPLICATION, WITHOUT LONG-TERM CURRENT USE OF INSULIN (HCC): ICD-10-CM

## 2021-12-29 RX ORDER — NITROGLYCERIN 0.4 MG/1
0.4 TABLET SUBLINGUAL EVERY 5 MIN PRN
Qty: 25 TABLET | Refills: 5 | Status: SHIPPED | OUTPATIENT
Start: 2021-12-29

## 2022-01-28 RX ORDER — ATORVASTATIN CALCIUM 80 MG/1
TABLET, FILM COATED ORAL
Qty: 90 TABLET | Refills: 3 | Status: SHIPPED | OUTPATIENT
Start: 2022-01-28

## 2022-01-28 NOTE — TELEPHONE ENCOUNTER
Requested Prescriptions     Pending Prescriptions Disp Refills    metoprolol tartrate (LOPRESSOR) 25 MG tablet [Pharmacy Med Name: METOPROLOL TARTRATE 25 MG Tablet] 90 tablet 3     Sig: TAKE 1/2 TABLET TWICE DAILY    atorvastatin (LIPITOR) 80 MG tablet [Pharmacy Med Name: ATORVASTATIN CALCIUM 80 MG Tablet] 90 tablet 3     Sig: TAKE 1 TABLET EVERY DAY              Last Office Visit: 10/12/2021     Next Office Visit: 4/22/2022

## 2022-04-15 RX ORDER — RAMIPRIL 5 MG/1
5 CAPSULE ORAL DAILY
Qty: 90 CAPSULE | Refills: 3 | Status: SHIPPED | OUTPATIENT
Start: 2022-04-15 | End: 2022-05-02

## 2022-04-15 NOTE — TELEPHONE ENCOUNTER
I Medication Refills:    ramipril (ALTACE) 5 MG capsule [[id number]]     Order Details  Dose, Route, Frequency: As Directed   Dispense Quantity: 90 capsule Refills: 3          Sig: TAKE 1 CAPSULE EVERY DAY         Start Date: 04/21/21 End Date: --   Written Date: 04/21/21 Expiration Date: 04/21/22   Original Order:  ramipril (ALTACE) 5 MG capsule [[id number]]     Pharmacy    [facility] [id number] [place] [address] [phone] - F [phone]   [address] [place] [address]   Phone:  [phone]  Fax:  [phone]         Last Office Visit: 10/12/21    Next Office Visit: 05/12/22    Last Refill: 04/21/21    Last Labs: 03/26/21

## 2022-05-02 RX ORDER — RAMIPRIL 5 MG/1
CAPSULE ORAL
Qty: 90 CAPSULE | Refills: 3 | Status: SHIPPED | OUTPATIENT
Start: 2022-05-02

## 2022-05-02 NOTE — TELEPHONE ENCOUNTER
Requested Prescriptions     Pending Prescriptions Disp Refills    ramipril (ALTACE) 5 MG capsule [Pharmacy Med Name: RAMIPRIL 5 MG Capsule] 90 capsule 3     Sig: TAKE 1 CAPSULE EVERY DAY              Last Office Visit: 10/12/2021     Next Office Visit: 05/04/2022

## 2022-05-04 ENCOUNTER — OFFICE VISIT (OUTPATIENT)
Dept: CARDIOLOGY CLINIC | Age: 77
End: 2022-05-04
Payer: MEDICARE

## 2022-05-04 VITALS
SYSTOLIC BLOOD PRESSURE: 132 MMHG | BODY MASS INDEX: 25.99 KG/M2 | HEART RATE: 76 BPM | WEIGHT: 197 LBS | DIASTOLIC BLOOD PRESSURE: 74 MMHG

## 2022-05-04 DIAGNOSIS — I10 ESSENTIAL HYPERTENSION: ICD-10-CM

## 2022-05-04 DIAGNOSIS — I25.2 MI, OLD: ICD-10-CM

## 2022-05-04 DIAGNOSIS — I25.10 CAD IN NATIVE ARTERY: Primary | ICD-10-CM

## 2022-05-04 PROCEDURE — 1123F ACP DISCUSS/DSCN MKR DOCD: CPT | Performed by: INTERNAL MEDICINE

## 2022-05-04 PROCEDURE — 1036F TOBACCO NON-USER: CPT | Performed by: INTERNAL MEDICINE

## 2022-05-04 PROCEDURE — 4040F PNEUMOC VAC/ADMIN/RCVD: CPT | Performed by: INTERNAL MEDICINE

## 2022-05-04 PROCEDURE — 99214 OFFICE O/P EST MOD 30 MIN: CPT | Performed by: INTERNAL MEDICINE

## 2022-05-04 PROCEDURE — G8427 DOCREV CUR MEDS BY ELIG CLIN: HCPCS | Performed by: INTERNAL MEDICINE

## 2022-05-04 PROCEDURE — G8417 CALC BMI ABV UP PARAM F/U: HCPCS | Performed by: INTERNAL MEDICINE

## 2022-05-04 NOTE — PROGRESS NOTES
Subjective:      Patient ID: Teresa Ruiz is a 68 y.o. male. HPI Kassie Mercedes is here today for follow up CAD/HTN/MI. Continues to walk 5x per wk. Very active. No angina. No sob. No pnd or orthopnea. No edema. Wt stable. No tachy/syncope. BP good. Walking 5 miles a day. Feeling good       Past Medical History:   Diagnosis Date    CAD (coronary artery disease)     Hyperlipidemia     Hypertension     Keratosis, seborrheic     Lower GI bleed     Myocardial infarction (HCC)     Sensorineural hearing loss of right ear     Shingles      Past Surgical History:   Procedure Laterality Date    CORONARY ANGIOPLASTY WITH STENT PLACEMENT      UPPER GASTROINTESTINAL ENDOSCOPY      received clipping for ania rice tear       Social History     Socioeconomic History    Marital status:      Spouse name: Not on file    Number of children: 2    Years of education: Not on file    Highest education level: Not on file   Occupational History    Occupation:      Comment: retired   Tobacco Use    Smoking status: Never Smoker    Smokeless tobacco: Former User   Substance and Sexual Activity    Alcohol use: No    Drug use: No    Sexual activity: Not on file   Other Topics Concern    Not on file   Social History Narrative    Not on file     Social Determinants of Health     Financial Resource Strain:     Difficulty of Paying Living Expenses: Not on file   Food Insecurity:     Worried About Running Out of Food in the Last Year: Not on file    Mena of Food in the Last Year: Not on file   Transportation Needs:     Lack of Transportation (Medical): Not on file    Lack of Transportation (Non-Medical):  Not on file   Physical Activity:     Days of Exercise per Week: Not on file    Minutes of Exercise per Session: Not on file   Stress:     Feeling of Stress : Not on file   Social Connections:     Frequency of Communication with Friends and Family: Not on file    Frequency of Social Gatherings with Friends and Family: Not on file    Attends Rastafarian Services: Not on file    Active Member of Clubs or Organizations: Not on file    Attends Club or Organization Meetings: Not on file    Marital Status: Not on file   Intimate Partner Violence:     Fear of Current or Ex-Partner: Not on file    Emotionally Abused: Not on file    Physically Abused: Not on file    Sexually Abused: Not on file   Housing Stability:     Unable to Pay for Housing in the Last Year: Not on file    Number of [place] in the Last Year: Not on file    Unstable Housing in the Last Year: Not on file     FH reviewed, denies FH cardiac issues    Vitals:    05/04/22 1446   BP: 132/74   Pulse: 76         Wt 197    Review of Systems   Constitutional: Negative for activity change and fatigue. Respiratory: Negative for apnea, cough, choking, chest tightness and shortness of breath. Cardiovascular: Negative for chest pain, palpitations and leg swelling. No PND or orthopnea. No tachycardia. Gastrointestinal: Negative for abdominal distention. Musculoskeletal: Negative for myalgias. Neurological: Negative for dizziness and syncope. Psychiatric/Behavioral: Negative for behavioral problems, confusion and agitation. All other systems reviewed negative as done    Objective:   Physical Exam   Constitutional: He is oriented to person, place, and time. He appears well-developed and well-nourished. No distress. HENT:   Head: Normocephalic and atraumatic. Eyes: Conjunctivae and EOM are normal. Right eye exhibits no discharge. Left eye exhibits no discharge. Neck: Normal range of motion. Neck supple. No JVD present. Cardiovascular: Normal rate, regular rhythm, S1 normal and S2 normal.  Exam reveals no gallop. No murmur heard. Pulses:       Radial pulses are 2+ on the right side, and 2+ on the left side. Pulmonary/Chest: Breath sounds normal. No respiratory distress. He has no wheezes.  He has no rales.   Abdominal: Soft. Bowel sounds are normal. No tenderness. Musculoskeletal: Normal range of motion. He exhibits no edema. Neurological: He is alert and oriented to person, place, and time. Skin: Skin is warm and dry. Psychiatric: He has a normal mood and affect. His behavior is normal. Thought content normal.       Assessment:       Diagnosis Orders   1. CAD in native artery     2. Essential hypertension     3. MI, old           Plan:      CV stable. No chest pain. Continues to exercise with no issues. .  BP is good. No exertional sx. Will continue ASA/ramipril/lopressor/lipitor for CAD/HTN. Reviewed previous cardiac records and testing including myoview 11/14. Continue to monitor. Follow up 6 months. Lipids per PCP. Continues wishes to be followed clinically.

## 2022-11-02 ENCOUNTER — OFFICE VISIT (OUTPATIENT)
Dept: CARDIOLOGY CLINIC | Age: 77
End: 2022-11-02
Payer: MEDICARE

## 2022-11-02 VITALS
SYSTOLIC BLOOD PRESSURE: 174 MMHG | HEIGHT: 73 IN | DIASTOLIC BLOOD PRESSURE: 92 MMHG | WEIGHT: 192.8 LBS | OXYGEN SATURATION: 96 % | HEART RATE: 70 BPM | BODY MASS INDEX: 25.55 KG/M2

## 2022-11-02 DIAGNOSIS — I25.10 CAD IN NATIVE ARTERY: Primary | ICD-10-CM

## 2022-11-02 DIAGNOSIS — I25.2 MI, OLD: ICD-10-CM

## 2022-11-02 DIAGNOSIS — I10 ESSENTIAL HYPERTENSION: ICD-10-CM

## 2022-11-02 PROCEDURE — G8427 DOCREV CUR MEDS BY ELIG CLIN: HCPCS | Performed by: INTERNAL MEDICINE

## 2022-11-02 PROCEDURE — G8484 FLU IMMUNIZE NO ADMIN: HCPCS | Performed by: INTERNAL MEDICINE

## 2022-11-02 PROCEDURE — 99214 OFFICE O/P EST MOD 30 MIN: CPT | Performed by: INTERNAL MEDICINE

## 2022-11-02 PROCEDURE — G8417 CALC BMI ABV UP PARAM F/U: HCPCS | Performed by: INTERNAL MEDICINE

## 2022-11-02 PROCEDURE — 3074F SYST BP LT 130 MM HG: CPT | Performed by: INTERNAL MEDICINE

## 2022-11-02 PROCEDURE — 1036F TOBACCO NON-USER: CPT | Performed by: INTERNAL MEDICINE

## 2022-11-02 PROCEDURE — 3078F DIAST BP <80 MM HG: CPT | Performed by: INTERNAL MEDICINE

## 2022-11-02 PROCEDURE — 1123F ACP DISCUSS/DSCN MKR DOCD: CPT | Performed by: INTERNAL MEDICINE

## 2022-11-02 NOTE — PROGRESS NOTES
Subjective:      Patient ID: Willian Carpenter is a 68 y.o. male. HPI Nolan Powers is here today for follow up CAD/HTN/MI. Continues to walk 5x per wk. Very active. No angina. No sob. No pnd or orthopnea. No edema. Wt stable. No tachy/syncope. BP good at home. .  Feeling good       Past Medical History:   Diagnosis Date    CAD (coronary artery disease)     Hyperlipidemia     Hypertension     Keratosis, seborrheic     Lower GI bleed     Myocardial infarction (HCC)     Sensorineural hearing loss of right ear     Shingles      Past Surgical History:   Procedure Laterality Date    CORONARY ANGIOPLASTY WITH STENT PLACEMENT      UPPER GASTROINTESTINAL ENDOSCOPY      received clipping for ania rice tear       Social History     Socioeconomic History    Marital status:      Spouse name: Not on file    Number of children: 2    Years of education: Not on file    Highest education level: Not on file   Occupational History    Occupation:      Comment: retired   Tobacco Use    Smoking status: Never    Smokeless tobacco: Former   Substance and Sexual Activity    Alcohol use: No    Drug use: No    Sexual activity: Not on file   Other Topics Concern    Not on file   Social History Narrative    Not on file     Social Determinants of Health     Financial Resource Strain: Not on file   Food Insecurity: Not on file   Transportation Needs: Not on file   Physical Activity: Not on file   Stress: Not on file   Social Connections: Not on file   Intimate Partner Violence: Not on file   Housing Stability: Not on file     FH reviewed, denies FH cardiac issues    Vitals:    11/02/22 1343   BP: (!) 174/92   Pulse: 70   SpO2:          Wt 197    Review of Systems   Constitutional: Negative for activity change and fatigue. Respiratory: Negative for apnea, cough, choking, chest tightness and shortness of breath. Cardiovascular: Negative for chest pain, palpitations and leg swelling. No PND or orthopnea.   No tachycardia. Gastrointestinal: Negative for abdominal distention. Musculoskeletal: Negative for myalgias. Neurological: Negative for dizziness and syncope. Psychiatric/Behavioral: Negative for behavioral problems, confusion and agitation. All other systems reviewed negative as done    Objective:   Physical Exam   Constitutional: He is oriented to person, place, and time. He appears well-developed and well-nourished. No distress. HENT:   Head: Normocephalic and atraumatic. Eyes: Conjunctivae and EOM are normal. Right eye exhibits no discharge. Left eye exhibits no discharge. Neck: Normal range of motion. Neck supple. No JVD present. Cardiovascular: Normal rate, regular rhythm, S1 normal and S2 normal.  Exam reveals no gallop. No murmur heard. Pulses:       Radial pulses are 2+ on the right side, and 2+ on the left side. Pulmonary/Chest: Breath sounds normal. No respiratory distress. He has no wheezes. He has no rales. Abdominal: Soft. Bowel sounds are normal. No tenderness. Musculoskeletal: Normal range of motion. He exhibits no edema. Neurological: He is alert and oriented to person, place, and time. Skin: Skin is warm and dry. Psychiatric: He has a normal mood and affect. His behavior is normal. Thought content normal.       Assessment:       Diagnosis Orders   1. CAD in native artery        2. Essential hypertension        3. MI, old                  Plan:      CV stable. No chest pain. Continues to exercise with no issues. .  BP is good at home. Recheck by me was 144/80. Jens Cesia No exertional sx. Will continue ASA/ramipril/lopressor/lipitor for CAD/HTN. Reviewed previous cardiac records and testing including myoview 11/14. Continue to monitor. Follow up 6 months. Lipids per PCP. Continues wishes to be followed clinically.

## 2022-11-29 RX ORDER — ATORVASTATIN CALCIUM 80 MG/1
TABLET, FILM COATED ORAL
Qty: 90 TABLET | Refills: 3 | Status: SHIPPED | OUTPATIENT
Start: 2022-11-29

## 2022-11-29 NOTE — TELEPHONE ENCOUNTER
Requested Prescriptions     Pending Prescriptions Disp Refills    metoprolol tartrate (LOPRESSOR) 25 MG tablet [Pharmacy Med Name: METOPROLOL TARTRATE 25 MG Tablet] 90 tablet 3     Sig: TAKE 1/2 TABLET TWICE DAILY    atorvastatin (LIPITOR) 80 MG tablet [Pharmacy Med Name: ATORVASTATIN CALCIUM 80 MG Tablet] 90 tablet 3     Sig: TAKE 1 TABLET EVERY DAY              Last Office Visit: 11.02.2022    Next Office Visit: [phone] /63

## 2022-12-19 DIAGNOSIS — E11.9 TYPE 2 DIABETES MELLITUS WITHOUT COMPLICATION, WITHOUT LONG-TERM CURRENT USE OF INSULIN (HCC): ICD-10-CM

## 2023-02-07 ENCOUNTER — HOSPITAL ENCOUNTER (INPATIENT)
Age: 78
LOS: 3 days | Discharge: HOME | DRG: 872 | End: 2023-02-11
Attending: EMERGENCY MEDICINE | Admitting: HOSPITALIST
Payer: MEDICARE

## 2023-02-07 DIAGNOSIS — R11.2 NAUSEA AND VOMITING, UNSPECIFIED VOMITING TYPE: ICD-10-CM

## 2023-02-07 DIAGNOSIS — R79.89 LFT ELEVATION: Primary | ICD-10-CM

## 2023-02-07 DIAGNOSIS — R10.13 ABDOMINAL PAIN, EPIGASTRIC: ICD-10-CM

## 2023-02-07 PROCEDURE — 93005 ELECTROCARDIOGRAM TRACING: CPT | Performed by: EMERGENCY MEDICINE

## 2023-02-07 PROCEDURE — 99285 EMERGENCY DEPT VISIT HI MDM: CPT

## 2023-02-07 PROCEDURE — 83690 ASSAY OF LIPASE: CPT

## 2023-02-07 PROCEDURE — 81003 URINALYSIS AUTO W/O SCOPE: CPT

## 2023-02-07 PROCEDURE — 96365 THER/PROPH/DIAG IV INF INIT: CPT

## 2023-02-07 PROCEDURE — 80076 HEPATIC FUNCTION PANEL: CPT

## 2023-02-07 PROCEDURE — 83605 ASSAY OF LACTIC ACID: CPT

## 2023-02-07 PROCEDURE — 96375 TX/PRO/DX INJ NEW DRUG ADDON: CPT

## 2023-02-07 PROCEDURE — 85025 COMPLETE CBC W/AUTO DIFF WBC: CPT

## 2023-02-07 PROCEDURE — 80048 BASIC METABOLIC PNL TOTAL CA: CPT

## 2023-02-07 RX ORDER — ONDANSETRON 2 MG/ML
4 INJECTION INTRAMUSCULAR; INTRAVENOUS ONCE
Status: COMPLETED | OUTPATIENT
Start: 2023-02-07 | End: 2023-02-08

## 2023-02-07 ASSESSMENT — ENCOUNTER SYMPTOMS
NAUSEA: 1
ABDOMINAL PAIN: 1
VOMITING: 1
RESPIRATORY NEGATIVE: 1
EYES NEGATIVE: 1

## 2023-02-07 ASSESSMENT — PAIN SCALES - GENERAL: PAINLEVEL_OUTOF10: 4

## 2023-02-07 ASSESSMENT — PAIN - FUNCTIONAL ASSESSMENT: PAIN_FUNCTIONAL_ASSESSMENT: 0-10

## 2023-02-08 ENCOUNTER — APPOINTMENT (OUTPATIENT)
Dept: GENERAL RADIOLOGY | Age: 78
DRG: 872 | End: 2023-02-08
Payer: MEDICARE

## 2023-02-08 ENCOUNTER — APPOINTMENT (OUTPATIENT)
Dept: CT IMAGING | Age: 78
DRG: 872 | End: 2023-02-08
Payer: MEDICARE

## 2023-02-08 ENCOUNTER — APPOINTMENT (OUTPATIENT)
Dept: ULTRASOUND IMAGING | Age: 78
DRG: 872 | End: 2023-02-08
Payer: MEDICARE

## 2023-02-08 ENCOUNTER — APPOINTMENT (OUTPATIENT)
Dept: MRI IMAGING | Age: 78
DRG: 872 | End: 2023-02-08
Payer: MEDICARE

## 2023-02-08 PROBLEM — R79.89 LFT ELEVATION: Status: ACTIVE | Noted: 2023-02-08

## 2023-02-08 PROBLEM — R74.8 ELEVATED LIVER ENZYMES: Status: ACTIVE | Noted: 2023-02-08

## 2023-02-08 PROBLEM — A41.9 SEPSIS WITHOUT ACUTE ORGAN DYSFUNCTION (HCC): Status: ACTIVE | Noted: 2023-02-08

## 2023-02-08 PROBLEM — E87.20 LACTIC ACIDOSIS: Status: ACTIVE | Noted: 2023-02-08

## 2023-02-08 PROBLEM — R11.2 NAUSEA AND VOMITING: Status: ACTIVE | Noted: 2023-02-08

## 2023-02-08 PROBLEM — R10.13 ABDOMINAL PAIN, EPIGASTRIC: Status: ACTIVE | Noted: 2023-02-08

## 2023-02-08 LAB
A/G RATIO: 2 (ref 1.1–2.2)
ALBUMIN SERPL-MCNC: 4 G/DL (ref 3.4–5)
ALBUMIN SERPL-MCNC: 4.3 G/DL (ref 3.4–5)
ALP BLD-CCNC: 213 U/L (ref 40–129)
ALP BLD-CCNC: 233 U/L (ref 40–129)
ALT SERPL-CCNC: 201 U/L (ref 10–40)
ALT SERPL-CCNC: 298 U/L (ref 10–40)
ANION GAP SERPL CALCULATED.3IONS-SCNC: 10 MMOL/L (ref 3–16)
ANION GAP SERPL CALCULATED.3IONS-SCNC: 17 MMOL/L (ref 3–16)
AST SERPL-CCNC: 403 U/L (ref 15–37)
AST SERPL-CCNC: 440 U/L (ref 15–37)
BASE EXCESS VENOUS: 2.7 MMOL/L (ref -2–3)
BASOPHILS ABSOLUTE: 0 K/UL (ref 0–0.2)
BASOPHILS ABSOLUTE: 0 K/UL (ref 0–0.2)
BASOPHILS RELATIVE PERCENT: 0 %
BASOPHILS RELATIVE PERCENT: 0.1 %
BILIRUB SERPL-MCNC: 0.9 MG/DL (ref 0–1)
BILIRUB SERPL-MCNC: 0.9 MG/DL (ref 0–1)
BILIRUB SERPL-MCNC: 1 MG/DL (ref 0–1)
BILIRUBIN DIRECT: 0.3 MG/DL (ref 0–0.3)
BILIRUBIN DIRECT: <0.2 MG/DL (ref 0–0.3)
BILIRUBIN URINE: NEGATIVE
BILIRUBIN, INDIRECT: 0.6 MG/DL (ref 0–1)
BILIRUBIN, INDIRECT: NORMAL MG/DL (ref 0–1)
BLOOD, URINE: NEGATIVE
BUN BLDV-MCNC: 20 MG/DL (ref 7–20)
BUN BLDV-MCNC: 20 MG/DL (ref 7–20)
C-REACTIVE PROTEIN: 3.9 MG/L (ref 0–5.1)
CALCIUM SERPL-MCNC: 9.4 MG/DL (ref 8.3–10.6)
CALCIUM SERPL-MCNC: 9.5 MG/DL (ref 8.3–10.6)
CARBOXYHEMOGLOBIN: 1.2 % (ref 0–1.5)
CHLORIDE BLD-SCNC: 100 MMOL/L (ref 99–110)
CHLORIDE BLD-SCNC: 96 MMOL/L (ref 99–110)
CLARITY: CLEAR
CO2: 23 MMOL/L (ref 21–32)
CO2: 29 MMOL/L (ref 21–32)
COLOR: YELLOW
CREAT SERPL-MCNC: 0.8 MG/DL (ref 0.8–1.3)
CREAT SERPL-MCNC: 0.8 MG/DL (ref 0.8–1.3)
EKG ATRIAL RATE: 79 BPM
EKG DIAGNOSIS: NORMAL
EKG P AXIS: 37 DEGREES
EKG P-R INTERVAL: 174 MS
EKG Q-T INTERVAL: 388 MS
EKG QRS DURATION: 110 MS
EKG QTC CALCULATION (BAZETT): 444 MS
EKG R AXIS: -32 DEGREES
EKG T AXIS: 41 DEGREES
EKG VENTRICULAR RATE: 79 BPM
EOSINOPHILS ABSOLUTE: 0 K/UL (ref 0–0.6)
EOSINOPHILS ABSOLUTE: 0 K/UL (ref 0–0.6)
EOSINOPHILS RELATIVE PERCENT: 0 %
EOSINOPHILS RELATIVE PERCENT: 0 %
ETHANOL: NORMAL MG/DL (ref 0–0.08)
GAMMA GLUTAMYL TRANSFERASE: 116 U/L (ref 8–61)
GFR SERPL CREATININE-BSD FRML MDRD: >60 ML/MIN/{1.73_M2}
GFR SERPL CREATININE-BSD FRML MDRD: >60 ML/MIN/{1.73_M2}
GLUCOSE BLD-MCNC: 141 MG/DL (ref 70–99)
GLUCOSE BLD-MCNC: 156 MG/DL (ref 70–99)
GLUCOSE URINE: NEGATIVE MG/DL
HAV IGM SER IA-ACNC: NORMAL
HCO3 VENOUS: 28.4 MMOL/L (ref 24–28)
HCT VFR BLD CALC: 36.8 % (ref 40.5–52.5)
HCT VFR BLD CALC: 37 % (ref 40.5–52.5)
HEMOGLOBIN, VEN, REDUCED: 31.6 %
HEMOGLOBIN: 12.4 G/DL (ref 13.5–17.5)
HEMOGLOBIN: 12.6 G/DL (ref 13.5–17.5)
HEPATITIS B CORE IGM ANTIBODY: NORMAL
HEPATITIS B SURFACE ANTIGEN INTERPRETATION: NORMAL
HEPATITIS C ANTIBODY INTERPRETATION: NORMAL
INFLUENZA A: NOT DETECTED
INFLUENZA B: NOT DETECTED
INR BLD: 1.04 (ref 0.87–1.14)
KETONES, URINE: ABNORMAL MG/DL
LACTIC ACID: 2 MMOL/L (ref 0.4–2)
LACTIC ACID: 2.2 MMOL/L (ref 0.4–2)
LACTIC ACID: 2.3 MMOL/L (ref 0.4–2)
LACTIC ACID: 4.4 MMOL/L (ref 0.4–2)
LEUKOCYTE ESTERASE, URINE: NEGATIVE
LIPASE: 15 U/L (ref 13–60)
LIPASE: 33 U/L (ref 13–60)
LYMPHOCYTES ABSOLUTE: 0.4 K/UL (ref 1–5.1)
LYMPHOCYTES ABSOLUTE: 0.6 K/UL (ref 1–5.1)
LYMPHOCYTES RELATIVE PERCENT: 2.7 %
LYMPHOCYTES RELATIVE PERCENT: 5.8 %
MAGNESIUM: 1.4 MG/DL (ref 1.8–2.4)
MCH RBC QN AUTO: 31.4 PG (ref 26–34)
MCH RBC QN AUTO: 31.6 PG (ref 26–34)
MCHC RBC AUTO-ENTMCNC: 33.7 G/DL (ref 31–36)
MCHC RBC AUTO-ENTMCNC: 34 G/DL (ref 31–36)
MCV RBC AUTO: 92.9 FL (ref 80–100)
MCV RBC AUTO: 93.3 FL (ref 80–100)
METHEMOGLOBIN VENOUS: 0.2 % (ref 0–1.5)
MICROSCOPIC EXAMINATION: ABNORMAL
MONOCYTES ABSOLUTE: 0.2 K/UL (ref 0–1.3)
MONOCYTES ABSOLUTE: 0.9 K/UL (ref 0–1.3)
MONOCYTES RELATIVE PERCENT: 2.3 %
MONOCYTES RELATIVE PERCENT: 6.5 %
NEUTROPHILS ABSOLUTE: 13.1 K/UL (ref 1.7–7.7)
NEUTROPHILS ABSOLUTE: 8.8 K/UL (ref 1.7–7.7)
NEUTROPHILS RELATIVE PERCENT: 90.7 %
NEUTROPHILS RELATIVE PERCENT: 91.9 %
NITRITE, URINE: NEGATIVE
O2 SAT, VEN: 68 %
PCO2, VEN: 46.6 MMHG (ref 41–51)
PDW BLD-RTO: 13.8 % (ref 12.4–15.4)
PDW BLD-RTO: 13.9 % (ref 12.4–15.4)
PH UA: 7.5 (ref 5–8)
PH VENOUS: 7.39 (ref 7.35–7.45)
PLATELET # BLD: 203 K/UL (ref 135–450)
PLATELET # BLD: 203 K/UL (ref 135–450)
PMV BLD AUTO: 8.5 FL (ref 5–10.5)
PMV BLD AUTO: 8.7 FL (ref 5–10.5)
PO2, VEN: 35 MMHG (ref 25–40)
POTASSIUM SERPL-SCNC: 4 MMOL/L (ref 3.5–5.1)
POTASSIUM SERPL-SCNC: 4.1 MMOL/L (ref 3.5–5.1)
PRO-BNP: 448 PG/ML (ref 0–449)
PROCALCITONIN: 5.9 NG/ML (ref 0–0.15)
PROTEIN UA: NEGATIVE MG/DL
PROTHROMBIN TIME: 13.5 SEC (ref 11.7–14.5)
RBC # BLD: 3.94 M/UL (ref 4.2–5.9)
RBC # BLD: 3.98 M/UL (ref 4.2–5.9)
SALICYLATE, SERUM: 0.6 MG/DL (ref 15–30)
SARS-COV-2 RNA, RT PCR: NOT DETECTED
SODIUM BLD-SCNC: 136 MMOL/L (ref 136–145)
SODIUM BLD-SCNC: 139 MMOL/L (ref 136–145)
SPECIFIC GRAVITY UA: 1.02 (ref 1–1.03)
TCO2 CALC VENOUS: 30 MMOL/L
TOTAL CK: 252 U/L (ref 39–308)
TOTAL PROTEIN: 6.1 G/DL (ref 6.4–8.2)
TOTAL PROTEIN: 6.5 G/DL (ref 6.4–8.2)
TROPONIN: <0.01 NG/ML
URINE TYPE: ABNORMAL
UROBILINOGEN, URINE: 1 E.U./DL
WBC # BLD: 14.5 K/UL (ref 4–11)
WBC # BLD: 9.6 K/UL (ref 4–11)

## 2023-02-08 PROCEDURE — 1200000000 HC SEMI PRIVATE

## 2023-02-08 PROCEDURE — C9113 INJ PANTOPRAZOLE SODIUM, VIA: HCPCS | Performed by: STUDENT IN AN ORGANIZED HEALTH CARE EDUCATION/TRAINING PROGRAM

## 2023-02-08 PROCEDURE — 87040 BLOOD CULTURE FOR BACTERIA: CPT

## 2023-02-08 PROCEDURE — 84145 PROCALCITONIN (PCT): CPT

## 2023-02-08 PROCEDURE — 96375 TX/PRO/DX INJ NEW DRUG ADDON: CPT

## 2023-02-08 PROCEDURE — 71045 X-RAY EXAM CHEST 1 VIEW: CPT

## 2023-02-08 PROCEDURE — 85025 COMPLETE CBC W/AUTO DIFF WBC: CPT

## 2023-02-08 PROCEDURE — 87186 SC STD MICRODIL/AGAR DIL: CPT

## 2023-02-08 PROCEDURE — 99223 1ST HOSP IP/OBS HIGH 75: CPT | Performed by: SURGERY

## 2023-02-08 PROCEDURE — 6360000002 HC RX W HCPCS: Performed by: STUDENT IN AN ORGANIZED HEALTH CARE EDUCATION/TRAINING PROGRAM

## 2023-02-08 PROCEDURE — 80074 ACUTE HEPATITIS PANEL: CPT

## 2023-02-08 PROCEDURE — 83735 ASSAY OF MAGNESIUM: CPT

## 2023-02-08 PROCEDURE — 99222 1ST HOSP IP/OBS MODERATE 55: CPT | Performed by: HOSPITALIST

## 2023-02-08 PROCEDURE — 80053 COMPREHEN METABOLIC PANEL: CPT

## 2023-02-08 PROCEDURE — 2580000003 HC RX 258: Performed by: PHYSICIAN ASSISTANT

## 2023-02-08 PROCEDURE — 86140 C-REACTIVE PROTEIN: CPT

## 2023-02-08 PROCEDURE — 96365 THER/PROPH/DIAG IV INF INIT: CPT

## 2023-02-08 PROCEDURE — 82550 ASSAY OF CK (CPK): CPT

## 2023-02-08 PROCEDURE — 6360000004 HC RX CONTRAST MEDICATION: Performed by: EMERGENCY MEDICINE

## 2023-02-08 PROCEDURE — 2580000003 HC RX 258: Performed by: EMERGENCY MEDICINE

## 2023-02-08 PROCEDURE — 76705 ECHO EXAM OF ABDOMEN: CPT

## 2023-02-08 PROCEDURE — 87636 SARSCOV2 & INF A&B AMP PRB: CPT

## 2023-02-08 PROCEDURE — 83880 ASSAY OF NATRIURETIC PEPTIDE: CPT

## 2023-02-08 PROCEDURE — 82803 BLOOD GASES ANY COMBINATION: CPT

## 2023-02-08 PROCEDURE — A9576 INJ PROHANCE MULTIPACK: HCPCS | Performed by: STUDENT IN AN ORGANIZED HEALTH CARE EDUCATION/TRAINING PROGRAM

## 2023-02-08 PROCEDURE — 82977 ASSAY OF GGT: CPT

## 2023-02-08 PROCEDURE — 6370000000 HC RX 637 (ALT 250 FOR IP): Performed by: STUDENT IN AN ORGANIZED HEALTH CARE EDUCATION/TRAINING PROGRAM

## 2023-02-08 PROCEDURE — 36415 COLL VENOUS BLD VENIPUNCTURE: CPT

## 2023-02-08 PROCEDURE — 74177 CT ABD & PELVIS W/CONTRAST: CPT

## 2023-02-08 PROCEDURE — 6360000002 HC RX W HCPCS: Performed by: EMERGENCY MEDICINE

## 2023-02-08 PROCEDURE — 6360000004 HC RX CONTRAST MEDICATION: Performed by: STUDENT IN AN ORGANIZED HEALTH CARE EDUCATION/TRAINING PROGRAM

## 2023-02-08 PROCEDURE — 82247 BILIRUBIN TOTAL: CPT

## 2023-02-08 PROCEDURE — 2580000003 HC RX 258: Performed by: STUDENT IN AN ORGANIZED HEALTH CARE EDUCATION/TRAINING PROGRAM

## 2023-02-08 PROCEDURE — 85610 PROTHROMBIN TIME: CPT

## 2023-02-08 PROCEDURE — 87150 DNA/RNA AMPLIFIED PROBE: CPT

## 2023-02-08 PROCEDURE — 74183 MRI ABD W/O CNTR FLWD CNTR: CPT

## 2023-02-08 PROCEDURE — 83605 ASSAY OF LACTIC ACID: CPT

## 2023-02-08 PROCEDURE — 80179 DRUG ASSAY SALICYLATE: CPT

## 2023-02-08 PROCEDURE — 84484 ASSAY OF TROPONIN QUANT: CPT

## 2023-02-08 PROCEDURE — 82248 BILIRUBIN DIRECT: CPT

## 2023-02-08 PROCEDURE — 83690 ASSAY OF LIPASE: CPT

## 2023-02-08 PROCEDURE — 82077 ASSAY SPEC XCP UR&BREATH IA: CPT

## 2023-02-08 RX ORDER — SODIUM CHLORIDE, SODIUM LACTATE, POTASSIUM CHLORIDE, AND CALCIUM CHLORIDE .6; .31; .03; .02 G/100ML; G/100ML; G/100ML; G/100ML
1000 INJECTION, SOLUTION INTRAVENOUS ONCE
Status: DISCONTINUED | OUTPATIENT
Start: 2023-02-08 | End: 2023-02-08

## 2023-02-08 RX ORDER — SODIUM CHLORIDE 9 MG/ML
INJECTION, SOLUTION INTRAVENOUS PRN
Status: DISCONTINUED | OUTPATIENT
Start: 2023-02-08 | End: 2023-02-11 | Stop reason: HOSPADM

## 2023-02-08 RX ORDER — SODIUM CHLORIDE 9 MG/ML
INJECTION, SOLUTION INTRAVENOUS CONTINUOUS
Status: DISCONTINUED | OUTPATIENT
Start: 2023-02-08 | End: 2023-02-11

## 2023-02-08 RX ORDER — SODIUM CHLORIDE 0.9 % (FLUSH) 0.9 %
5-40 SYRINGE (ML) INJECTION PRN
Status: DISCONTINUED | OUTPATIENT
Start: 2023-02-08 | End: 2023-02-11 | Stop reason: HOSPADM

## 2023-02-08 RX ORDER — ACETAMINOPHEN 325 MG/1
650 TABLET ORAL EVERY 6 HOURS PRN
Status: DISCONTINUED | OUTPATIENT
Start: 2023-02-08 | End: 2023-02-11 | Stop reason: HOSPADM

## 2023-02-08 RX ORDER — ENOXAPARIN SODIUM 100 MG/ML
40 INJECTION SUBCUTANEOUS DAILY
Status: DISCONTINUED | OUTPATIENT
Start: 2023-02-08 | End: 2023-02-11 | Stop reason: HOSPADM

## 2023-02-08 RX ORDER — SODIUM CHLORIDE, SODIUM LACTATE, POTASSIUM CHLORIDE, AND CALCIUM CHLORIDE .6; .31; .03; .02 G/100ML; G/100ML; G/100ML; G/100ML
1000 INJECTION, SOLUTION INTRAVENOUS ONCE
Status: COMPLETED | OUTPATIENT
Start: 2023-02-08 | End: 2023-02-08

## 2023-02-08 RX ORDER — PANTOPRAZOLE SODIUM 40 MG/10ML
40 INJECTION, POWDER, LYOPHILIZED, FOR SOLUTION INTRAVENOUS DAILY
Status: DISCONTINUED | OUTPATIENT
Start: 2023-02-08 | End: 2023-02-11 | Stop reason: HOSPADM

## 2023-02-08 RX ORDER — ONDANSETRON 2 MG/ML
4 INJECTION INTRAMUSCULAR; INTRAVENOUS EVERY 6 HOURS PRN
Status: DISCONTINUED | OUTPATIENT
Start: 2023-02-08 | End: 2023-02-11 | Stop reason: HOSPADM

## 2023-02-08 RX ORDER — HALOPERIDOL 5 MG/ML
2.5 INJECTION INTRAMUSCULAR ONCE
Status: COMPLETED | OUTPATIENT
Start: 2023-02-08 | End: 2023-02-08

## 2023-02-08 RX ORDER — RAMIPRIL 5 MG/1
5 CAPSULE ORAL DAILY
Status: DISCONTINUED | OUTPATIENT
Start: 2023-02-08 | End: 2023-02-08

## 2023-02-08 RX ORDER — ONDANSETRON 4 MG/1
4 TABLET, ORALLY DISINTEGRATING ORAL EVERY 8 HOURS PRN
Status: DISCONTINUED | OUTPATIENT
Start: 2023-02-08 | End: 2023-02-11 | Stop reason: HOSPADM

## 2023-02-08 RX ORDER — ACETAMINOPHEN 650 MG/1
650 SUPPOSITORY RECTAL EVERY 6 HOURS PRN
Status: DISCONTINUED | OUTPATIENT
Start: 2023-02-08 | End: 2023-02-11 | Stop reason: HOSPADM

## 2023-02-08 RX ORDER — ASPIRIN 81 MG/1
81 TABLET ORAL DAILY
Status: DISCONTINUED | OUTPATIENT
Start: 2023-02-08 | End: 2023-02-11 | Stop reason: HOSPADM

## 2023-02-08 RX ORDER — POLYETHYLENE GLYCOL 3350 17 G/17G
17 POWDER, FOR SOLUTION ORAL DAILY PRN
Status: DISCONTINUED | OUTPATIENT
Start: 2023-02-08 | End: 2023-02-11 | Stop reason: HOSPADM

## 2023-02-08 RX ORDER — SODIUM CHLORIDE 0.9 % (FLUSH) 0.9 %
5-40 SYRINGE (ML) INJECTION EVERY 12 HOURS SCHEDULED
Status: DISCONTINUED | OUTPATIENT
Start: 2023-02-08 | End: 2023-02-11 | Stop reason: HOSPADM

## 2023-02-08 RX ORDER — OLANZAPINE 10 MG/1
2.5 INJECTION, POWDER, LYOPHILIZED, FOR SOLUTION INTRAMUSCULAR
Status: COMPLETED | OUTPATIENT
Start: 2023-02-08 | End: 2023-02-09

## 2023-02-08 RX ORDER — MAGNESIUM SULFATE IN WATER 40 MG/ML
4000 INJECTION, SOLUTION INTRAVENOUS ONCE
Status: COMPLETED | OUTPATIENT
Start: 2023-02-08 | End: 2023-02-08

## 2023-02-08 RX ADMIN — PANTOPRAZOLE SODIUM 40 MG: 40 INJECTION, POWDER, LYOPHILIZED, FOR SOLUTION INTRAVENOUS at 17:20

## 2023-02-08 RX ADMIN — GADOTERIDOL 19 ML: 279.3 INJECTION, SOLUTION INTRAVENOUS at 21:21

## 2023-02-08 RX ADMIN — IOPAMIDOL 80 ML: 755 INJECTION, SOLUTION INTRAVENOUS at 01:33

## 2023-02-08 RX ADMIN — SODIUM CHLORIDE: 9 INJECTION, SOLUTION INTRAVENOUS at 08:10

## 2023-02-08 RX ADMIN — SODIUM CHLORIDE, PRESERVATIVE FREE 10 ML: 5 INJECTION INTRAVENOUS at 09:03

## 2023-02-08 RX ADMIN — ENOXAPARIN SODIUM 40 MG: 100 INJECTION SUBCUTANEOUS at 09:03

## 2023-02-08 RX ADMIN — HALOPERIDOL LACTATE 2.5 MG: 5 INJECTION, SOLUTION INTRAMUSCULAR at 02:54

## 2023-02-08 RX ADMIN — ASPIRIN 81 MG: 81 TABLET, COATED ORAL at 09:03

## 2023-02-08 RX ADMIN — PIPERACILLIN AND TAZOBACTAM 3375 MG: 3; .375 INJECTION, POWDER, LYOPHILIZED, FOR SOLUTION INTRAVENOUS at 17:22

## 2023-02-08 RX ADMIN — PIPERACILLIN AND TAZOBACTAM 3375 MG: 3; .375 INJECTION, POWDER, LYOPHILIZED, FOR SOLUTION INTRAVENOUS at 10:24

## 2023-02-08 RX ADMIN — IOHEXOL 50 ML: 240 INJECTION, SOLUTION INTRATHECAL; INTRAVASCULAR; INTRAVENOUS; ORAL at 01:33

## 2023-02-08 RX ADMIN — PIPERACILLIN SODIUM AND TAZOBACTAM SODIUM 3375 MG: 3; .375 INJECTION, POWDER, LYOPHILIZED, FOR SOLUTION INTRAVENOUS at 04:00

## 2023-02-08 RX ADMIN — ONDANSETRON 4 MG: 2 INJECTION INTRAMUSCULAR; INTRAVENOUS at 00:00

## 2023-02-08 RX ADMIN — SODIUM CHLORIDE 125 ML: 9 INJECTION, SOLUTION INTRAVENOUS at 23:20

## 2023-02-08 RX ADMIN — SODIUM CHLORIDE, POTASSIUM CHLORIDE, SODIUM LACTATE AND CALCIUM CHLORIDE 1000 ML: 600; 310; 30; 20 INJECTION, SOLUTION INTRAVENOUS at 05:53

## 2023-02-08 RX ADMIN — MAGNESIUM SULFATE HEPTAHYDRATE 4000 MG: 40 INJECTION, SOLUTION INTRAVENOUS at 09:02

## 2023-02-08 ASSESSMENT — PAIN SCALES - GENERAL
PAINLEVEL_OUTOF10: 0
PAINLEVEL_OUTOF10: 0

## 2023-02-08 ASSESSMENT — ENCOUNTER SYMPTOMS
DIARRHEA: 0
CONSTIPATION: 0
EYES NEGATIVE: 1
VOMITING: 1
RESPIRATORY NEGATIVE: 1
COUGH: 0
NAUSEA: 1
ABDOMINAL PAIN: 1
SHORTNESS OF BREATH: 0
ABDOMINAL DISTENTION: 0
GASTROINTESTINAL NEGATIVE: 1

## 2023-02-08 NOTE — PLAN OF CARE
Problem: Risk for Elopement  Goal: Patient will not exit the unit/facility without proper excort  Flowsheets (Taken 2/8/2023 0600)  Nursing Interventions for Elopement Risk: (placed by nurse's station with a [employer] safety camera)   Collaborate with family members/caregivers to mitigate the elopement risk   Assist with personal care needs such as toileting, eating, dressing, as needed to reduce the risk of wandering   Place patient in room far away from exits and stairways

## 2023-02-08 NOTE — ED PROVIDER NOTES
[address] [facility]          ATTENDING PHYSICIAN NOTE       Date of evaluation: 2/7/2023    Chief Complaint     Abdominal Pain (Pt complaining of upper abdominal pain for a few hours. The pain started after he ate. He cannot describe the pain but vomiting did help the pain)      History of Present Illness     Jose Pinedo is a 68 y.o. male who presents to the emergency department complaining of abdominal pain. Patient states pain started approximately 2 to 3 hours prior to presentation. Patient describes it as a nausea sensation located in the center of his abdomen. He denies any radiation of the pain. He states that after the pain started he had 1 episode of vomiting that was nonbloody and nonbilious and the pain completely resolved, but then it started to increase again. He denies any fevers or chills. He denies any cough or shortness of breath. He denies any chest pain or pressure. He does note increased urinary frequency but states that this is baseline for him. He states his last bowel movement was earlier today but does not believe he has passed flatus since the pain started. His wife did give him sublingual nitroglycerin when the pain started without change in his symptoms. He does have a history of coronary artery disease but states that this pain feels different than the sensation he had with that, which she describes as more of a typical angina. ASSESSMENT / PLAN  (MEDICAL DECISION MAKING)     INITIAL VITALS: BP: 134/62, Temp: 98.1 °F (36.7 °C), Heart Rate: 77, Resp: 16, SpO2: 96 %      Jose Pinedo is a 68 y.o. male who presents for evaluation of abdominal pain. Patient states that the pain started earlier this evening and describes as an intense nausea sensation. He did have 1 episode of nonbloody and nonbilious vomiting at home which she states did initially help with the pain but it came back soon afterwards.   On arrival, patient is hemodynamically stable. He has a soft abdomen with no reproducible tenderness. Laboratory studies are significant for a lactate of 2.3. CBC is unremarkable. Renal panel is within normal is. LFTs are significant for elevated transaminases and alkaline phosphatase. Urinalysis shows no evidence of infection. CT scan of the abdomen and pelvis shows no acute abnormalities. Soon after the patient returned from his CT, he was noted to become more confused and was now febrile to 100.6. Patient was agitated the point of trying to get up and leave the emergency department and did not have capacity make decision so he was given 2.5 mg of Haldol. Repeat lactate was obtained which was elevated at 4.4. Patient was written for IV fluids as well as Zosyn for concern for a sending cholangitis. A chest x-ray was obtained that shows right lower lobe atelectasis. Troponin and NT proBNP are unremarkable. I did speak with the on-call provider for the patient's primary care provider and the patient will be admitted for further management. Medical Decision Making  Amount and/or Complexity of Data Reviewed  Independent Historian: vickie  External Data Reviewed: labs, ECG and notes. Labs: ordered. Decision-making details documented in ED Course. Radiology: ordered. Decision-making details documented in ED Course. ECG/medicine tests: ordered and independent interpretation performed. Decision-making details documented in ED Course. Risk  Prescription drug management. Parenteral controlled substances. Decision regarding hospitalization. Clinical Impression     1. LFT elevation    2. Abdominal pain, epigastric    3. Nausea and vomiting, unspecified vomiting type        Disposition     PATIENT REFERRED TO:  No follow-up provider specified.     DISCHARGE MEDICATIONS:  New Prescriptions    No medications on file       DISPOSITION Admitted 02/08/2023 04:22:10 AM        Diagnostic Results and Other Data RADIOLOGY:  XR CHEST PORTABLE   Final Result   1. Mild left lower lobe atelectasis or infiltrate. CT ABDOMEN PELVIS W IV CONTRAST Additional Contrast? Oral   Final Result      No acute abdominopelvic abnormality. INCIDENTAL FINDINGS: None.       US GALLBLADDER RUQ    (Results Pending)       LABS:   Results for orders placed or performed during the hospital encounter of 02/07/23   CBC with Auto Differential   Result Value Ref Range    WBC 9.6 4.0 - 11.0 K/uL    RBC 3.98 (L) 4.20 - 5.90 M/uL    Hemoglobin 12.6 (L) 13.5 - 17.5 g/dL    Hematocrit 37.0 (L) 40.5 - 52.5 %    MCV 92.9 80.0 - 100.0 fL    MCH 31.6 26.0 - 34.0 pg    MCHC 34.0 31.0 - 36.0 g/dL    RDW 13.9 12.4 - 15.4 %    Platelets 740 859 - 905 K/uL    MPV 8.7 5.0 - 10.5 fL    Neutrophils % 91.9 %    Lymphocytes % 5.8 %    Monocytes % 2.3 %    Eosinophils % 0.0 %    Basophils % 0.0 %    Neutrophils Absolute 8.8 (H) 1.7 - 7.7 K/uL    Lymphocytes Absolute 0.6 (L) 1.0 - 5.1 K/uL    Monocytes Absolute 0.2 0.0 - 1.3 K/uL    Eosinophils Absolute 0.0 0.0 - 0.6 K/uL    Basophils Absolute 0.0 0.0 - 0.2 K/uL   Basic Metabolic Panel   Result Value Ref Range    Sodium 139 136 - 145 mmol/L    Potassium 4.0 3.5 - 5.1 mmol/L    Chloride 100 99 - 110 mmol/L    CO2 29 21 - 32 mmol/L    Anion Gap 10 3 - 16    Glucose 141 (H) 70 - 99 mg/dL    BUN 20 7 - 20 mg/dL    Creatinine 0.8 0.8 - 1.3 mg/dL    Est, Glom Filt Rate >60 >60    Calcium 9.4 8.3 - 10.6 mg/dL   Hepatic Function Panel   Result Value Ref Range    Total Protein 6.1 (L) 6.4 - 8.2 g/dL    Albumin 4.0 3.4 - 5.0 g/dL    Alkaline Phosphatase 213 (H) 40 - 129 U/L     (H) 10 - 40 U/L     (H) 15 - 37 U/L    Total Bilirubin 0.9 0.0 - 1.0 mg/dL    Bilirubin, Direct 0.3 0.0 - 0.3 mg/dL    Bilirubin, Indirect 0.6 0.0 - 1.0 mg/dL   Lipase   Result Value Ref Range    Lipase 33.0 13.0 - 60.0 U/L   Urinalysis   Result Value Ref Range    Color, UA Yellow Straw/Yellow    Clarity, UA Clear Clear    Glucose, Ur Negative Negative mg/dL    Bilirubin Urine Negative Negative    Ketones, Urine TRACE (A) Negative mg/dL    Specific Gravity, UA 1.020 1.005 - 1.030    Blood, Urine Negative Negative    pH, UA 7.5 5.0 - 8.0    Protein, UA Negative Negative mg/dL    Urobilinogen, Urine 1.0 <2.0 E.U./dL    Nitrite, Urine Negative Negative    Leukocyte Esterase, Urine Negative Negative    Microscopic Examination Not Indicated     Urine Type Voided    Lactic Acid   Result Value Ref Range    Lactic Acid 2.3 (H) 0.4 - 2.0 mmol/L   Lactic Acid   Result Value Ref Range    Lactic Acid 4.4 (HH) 0.4 - 2.0 mmol/L   Troponin   Result Value Ref Range    Troponin <0.01 <0.01 ng/mL   Brain Natriuretic Peptide   Result Value Ref Range    Pro- 0 - 449 pg/mL   Blood Gas, Venous   Result Value Ref Range    pH, River 7.393 7.350 - 7.450    pCO2, River 46.6 41.0 - 51.0 mmHg    pO2, River 35.0 25.0 - 40.0 mmHg    HCO3, Venous 28.4 (H) 24.0 - 28.0 mmol/L    Base Excess, River 2.7 -2.0 - 3.0 mmol/L    O2 Sat, River 68 Not established %    Carboxyhemoglobin 1.2 0.0 - 1.5 %    MetHgb, River 0.2 0.0 - 1.5 %    TC02 (Calc), River 30 mmol/L    Hemoglobin, River, Reduced 31.60 %     EKG   EKG as interpreted by me shows the patient to be in a normal sinus rhythm with left axis deviation, normal MI and QT intervals, normal QRS duration, no ST segment abnormalities, no T wave abnormalities. ED BEDSIDE ULTRASOUND:  No results found. MOST RECENT VITALS:  BP: (!) 152/82,Temp: (!) 100.6 °F (38.1 °C), Heart Rate: (!) 125, Resp: 16, SpO2: 96 %     Procedures     N/A    ED Course     Nursing Notes, Past Medical Hx, Past Surgical Hx, Social Hx,Allergies, and Family Hx were reviewed.          The patient was given the following medications:  Orders Placed This Encounter   Medications    ondansetron (ZOFRAN) injection 4 mg    iohexol (OMNIPAQUE 240) IV/PO solution 50 mL    iopamidol (ISOVUE-370) 76 % injection 80 mL    piperacillin-tazobactam (ZOSYN) 3,375 mg in sodium chloride 0.9 % 50 mL IVPB (mini-bag)     Order Specific Question:   Antimicrobial Indications     Answer:   Intra-Abdominal Infection    haloperidol lactate (HALDOL) injection 2.5 mg    lactated ringers bolus       CONSULTS:  IP CONSULT TO PRIMARY CARE PROVIDER    Review of Systems     Review of Systems   Constitutional: Negative. HENT: Negative. Eyes: Negative. Respiratory: Negative. Cardiovascular: Negative. Gastrointestinal:  Positive for abdominal pain, nausea and vomiting. Genitourinary: Negative. Musculoskeletal: Negative. Neurological: Negative. All other systems reviewed and are negative. Past Medical, Surgical, Family, and Social History     He has a past medical history of CAD (coronary artery disease), Hyperlipidemia, Hypertension, Keratosis, seborrheic, Lower GI bleed, Myocardial infarction ([address] Sensorineural hearing loss of right ear, and Shingles. He has a past surgical history that includes Coronary angioplasty with stent and Upper gastrointestinal endoscopy. His family history includes Cancer in his brother and brother; Diabetes in his father; Heart Disease in his brother, brother, maternal grandfather, and mother; Heart Failure in his father and mother; Hypertension in his mother. He reports that he has never smoked. He has quit using smokeless tobacco. He reports that he does not drink alcohol and does not use drugs.     Medications     Previous Medications    ASPIRIN 81 MG EC TABLET    81 mg Take 1/2 tablet twice daily    ATORVASTATIN (LIPITOR) 80 MG TABLET    TAKE 1 TABLET EVERY DAY    ESOMEPRAZOLE (NEXIUM) 20 MG DELAYED RELEASE CAPSULE    Take 20 mg by mouth every morning (before breakfast)    FERROUS SULFATE (IRON 325) 325 (65 FE) MG TABLET    TAKE 1 TABLET BY MOUTH EVERY DAY    METFORMIN (GLUCOPHAGE) 1000 MG TABLET    TAKE 1 TABLET BY MOUTH TWICE A DAY WITH MEALS    METOPROLOL TARTRATE (LOPRESSOR) 25 MG TABLET    TAKE 1/2 TABLET TWICE DAILY    NITROGLYCERIN (NITROSTAT) 0.4 MG SL TABLET    Place 1 tablet under the tongue every 5 minutes as needed (PRN)    RAMIPRIL (ALTACE) 5 MG CAPSULE    TAKE 1 CAPSULE EVERY DAY       Allergies     He is allergic to codeine and sulfa antibiotics. Physical Exam     INITIAL VITALS: BP: 134/62, Temp: 98.1 °F (36.7 °C), Heart Rate: 77, Resp: 16, SpO2: 96 %   Physical Exam  Vitals and nursing note reviewed. Constitutional:       General: He is not in acute distress. HENT:      Head: Normocephalic and atraumatic. Mouth/Throat:      Mouth: Mucous membranes are moist.      Pharynx: No oropharyngeal exudate. Eyes:      General: No scleral icterus. Extraocular Movements: Extraocular movements intact. Conjunctiva/sclera: Conjunctivae normal.      Pupils: Pupils are equal, round, and reactive to light. Cardiovascular:      Rate and Rhythm: Normal rate and regular rhythm. Heart sounds: Normal heart sounds. Pulmonary:      Effort: Pulmonary effort is normal.      Breath sounds: Normal breath sounds. No wheezing, rhonchi or rales. Abdominal:      General: Bowel sounds are increased. Palpations: Abdomen is soft. Tenderness: There is no abdominal tenderness. There is no guarding or rebound. Musculoskeletal:         General: No swelling. Normal range of motion. Cervical back: Normal range of motion and neck supple. Skin:     General: Skin is warm and dry. Neurological:      General: No focal deficit present. Mental Status: He is alert and oriented to person, place, and time. Cranial Nerves: No cranial nerve deficit. Motor: No weakness.       Coordination: Coordination normal.                  Triston Gupta MD  02/08/23 [id number]

## 2023-02-08 NOTE — H&P
Internal Medicine H&P    Date: 2023   Patient: Sharon He   Patient : 1945     CC: Abdominal Pain (Pt complaining of upper abdominal pain for a few hours. The pain started after he ate. He cannot describe the pain but vomiting did help the pain)       HPI:   Sharon He is a 68 y.o. male with a PMHx: HTN, CAD, DM, Prior MI who presented to the ED c/o of N/V and abdominal pain onset 4 hours prior to presentation. Per spouse at bedside he had 2 episodes of vomiting- non bilious, mainly stomach contents with no blood. After his second episode of emesis his abdominal pain improved. Patient described abdominal pain mainly in the epigastric region that initially resolved when he presented to the ED however it started to increase again. Last bowel movement noted to be same day of ED presentation. Due to patient having a prior MI wife reported that she was very concerned that he was having another 1 therefore she gave him a sublingual nitroglycerin tablet which did not improve his abdominal pain. In the ED patient reported that the pain felt much different from when he had his myocardial infarction. On presentation to the ED he was hemodynamically stable, CBC was unremarkable and BMP also unremarkable however CMP with elevated alkaline phosphatase, ALT/AST. Urinalysis was not revealing of any infection. CT abdomen overall revealed no acute abnormalities however there was some periportal edema seen. Per ED physician when he returned from his CT abdomen he was noted to be more confused and febrile at 100.6. Per wife he does wax and wane with confusion over the past year and a half therefore she was not remarkably concerned. He was given a dose of Haldol 2.5 mg in the ED due to him being agitated and attempting to leave the emergency department. He was noted not to have decision-making capacity. Patient seen at bedside.   Only alert to name however he Responded That He Does Not Know the Time of Year Alte[place]. He is able to hold a conversation and answer questions concerning his symptoms. Wife reports that early on during the day he had a mild headache. He denies any chest pain, palpitations, diarrhea, dizziness or lightheadedness. He will be admitted for further evaluation of his elevated liver enzymes, confusion and new fever.     PMHx:      Diagnosis Date    CAD (coronary artery disease)     Hyperlipidemia     Hypertension     Keratosis, seborrheic     Lower GI bleed     Myocardial infarction ([address]     Sensorineural hearing loss of right ear     Shingles        PSurgHx:      Procedure Laterality Date    CORONARY ANGIOPLASTY WITH STENT PLACEMENT      UPPER GASTROINTESTINAL ENDOSCOPY      received clipping for ania rice tear        Medication:  Medications Prior to Admission: metFORMIN (GLUCOPHAGE) 1000 MG tablet, TAKE 1 TABLET BY MOUTH TWICE A DAY WITH MEALS  metoprolol tartrate (LOPRESSOR) 25 MG tablet, TAKE 1/2 TABLET TWICE DAILY  atorvastatin (LIPITOR) 80 MG tablet, TAKE 1 TABLET EVERY DAY  ramipril (ALTACE) 5 MG capsule, TAKE 1 CAPSULE EVERY DAY  nitroGLYCERIN (NITROSTAT) 0.4 MG SL tablet, Place 1 tablet under the tongue every 5 minutes as needed (PRN)  [DISCONTINUED] ferrous sulfate (IRON 325) 325 (65 Fe) MG tablet, TAKE 1 TABLET BY MOUTH EVERY DAY  esomeprazole (NEXIUM) 20 MG delayed release capsule, Take 20 mg by mouth every morning (before breakfast)  aspirin 81 MG EC tablet, Take 81 mg by mouth daily Take 1/2 tablet twice daily    Allergies:  Codeine and Sulfa antibiotics    SocHx:  Social History     Socioeconomic History    Marital status:      Spouse name: None    Number of children: 2    Years of education: None    Highest education level: None   Occupational History    Occupation:      Comment: retired   Tobacco Use    Smoking status: Never    Smokeless tobacco: Former   Substance and Sexual Activity    Alcohol use: No    Drug use: No        FHx: Problem Relation Age of Onset    Heart Disease Mother     Heart Failure Mother     Hypertension Mother     Diabetes Father     Heart Failure Father     Cancer Brother     Heart Disease Brother     Heart Disease Maternal Grandfather     Cancer Brother     Heart Disease Brother     Anemia Neg Hx     Arrhythmia Neg Hx     Asthma Neg Hx     Clotting Disorder Neg Hx     Fainting Neg Hx     Heart Attack Neg Hx     Heart Surgery Neg Hx     High Cholesterol Neg Hx     Pacemaker Neg Hx        ROS:  Review of Systems   Constitutional:  Positive for fatigue and fever. Negative for diaphoresis. Respiratory:  Negative for cough and shortness of breath. Cardiovascular:  Negative for chest pain, palpitations and leg swelling. Gastrointestinal:  Positive for abdominal pain, nausea and vomiting. Negative for abdominal distention, constipation and diarrhea. Genitourinary: Negative. Neurological:  Negative for dizziness, weakness and light-headedness. Psychiatric/Behavioral:  Positive for confusion. Objective     Vital Signs:  Patient Vitals for the past 8 hrs:   BP Temp Temp src Pulse Resp SpO2 Height Weight   02/08/23 0600 -- -- -- (!) 103 -- -- -- --   02/08/23 0534 (!) 97/52 98.8 °F (37.1 °C) Oral (!) 102 18 92 % -- --   02/08/23 0500 (!) 113/48 99.1 °F (37.3 °C) Oral 90 -- 93 % -- --   02/08/23 0230 -- -- -- -- -- 96 % -- --   02/08/23 0220 (!) 152/82 (!) 100.6 °F (38.1 °C) -- (!) 125 -- 92 % -- --   02/08/23 0031 (!) 124/57 -- -- 90 -- 97 % -- --   02/07/23 2259 -- -- -- -- -- -- 6' 1\" (1.854 m) 195 lb 3.2 oz (88.5 kg)   02/07/23 2257 134/62 98.1 °F (36.7 °C) Oral 77 16 96 % -- --       Physical Exam:  Constitutional:  HEENT:  Neck:  Cardiovascular:  Pulmonary:  Abdomen:  Musculoskeletal:  Skin:  Neurological:  Psychological:    Physical Exam  Constitutional:       Appearance: He is ill-appearing. HENT:      Mouth/Throat:      Mouth: Mucous membranes are dry. Pharynx: Oropharynx is clear.    Eyes: Pupils: Pupils are equal, round, and reactive to light. Cardiovascular:      Rate and Rhythm: Normal rate. Pulses: Normal pulses. Heart sounds: Normal heart sounds. No murmur heard. Pulmonary:      Effort: Pulmonary effort is normal.      Breath sounds: Normal breath sounds. No wheezing or rales. Abdominal:      General: Bowel sounds are normal. There is no distension. Tenderness: There is no abdominal tenderness. Musculoskeletal:      Right lower leg: No edema. Left lower leg: No edema. Skin:     General: Skin is warm. Capillary Refill: Capillary refill takes less than 2 seconds. Neurological:      Mental Status: He is alert. He is disoriented. Cranial Nerves: No cranial nerve deficit. Motor: No weakness. Comments: Patient only alert and oriented to name. He is able to follow all commands. Cranial nerve II to XII grossly intact. No weakness noted. No dysmetria, no dysdiadochokinesia. Gait deferred      Labs:  CBC:   Recent Labs     02/07/23 2359 02/08/23  0558   WBC 9.6 14.5*   HGB 12.6* 12.4*   HCT 37.0* 36.8*    203       BMP:   Recent Labs     02/07/23 2359 02/08/23  0558    136   K 4.0 4.1    96*   CO2 29 23   BUN 20 20   CREATININE 0.8 0.8   GLUCOSE 141* 156*     Magnesium:   Recent Labs     02/08/23  0558   MG 1.40*     LFT's:   Recent Labs     02/07/23 2359 02/08/23  0558   * 440*   * 298*   BILITOT 0.9 0.9   ALKPHOS 213* 233*       Troponin:   Recent Labs     02/08/23  0322   TROPONINI <0.01       Pro-BNP:   Recent Labs     02/08/23  0322   PROBNP 448       Procalcitonin: No results for input(s): PROCAL in the last 72 hours. CRP: No results for input(s): CRP in the last 72 hours. ESR: No results for input(s): ESR in the last 72 hours. ABGs: No results for input(s): PHART, DFK6OKX, PO2ART in the last 72 hours.   VBGs:   Recent Labs     02/08/23  0300   PHVEN 7.393   VRM6RPA 46.6   PO2VEN 35.0       INR: No results for input(s): INR in the last 72 hours. COVID-19: No results for input(s): COVID19 in the last 72 hours. U/A:   Recent Labs     02/07/23  2355   COLORU Yellow   PHUR 7.5   CLARITYU Clear   SPECGRAV 1.020   LEUKOCYTESUR Negative   UROBILINOGEN 1.0   BILIRUBINUR Negative   BLOODU Negative   GLUCOSEU Negative       Radiology:  XR CHEST PORTABLE   Final Result   1. Mild left lower lobe atelectasis or infiltrate. CT ABDOMEN PELVIS W IV CONTRAST Additional Contrast? Oral   Final Result      No acute abdominopelvic abnormality. INCIDENTAL FINDINGS: None. US GALLBLADDER RUQ    (Results Pending)         Assessment & Plan     Abdominal pain for investigation  Rule out Acute Ascending Cholangitis   Elevated liver enzymes  CT abdomen and pelvis only revealing mild periportal edema  Patient with no leukocytosis and jaundice however during ED stay he became altered with an episode of hypotension and febrile. Moderate elevation in AST and ALT 5x ULN  SIRS 2/4: Elevated temperature and tachycardia  Received Zosyn in the ED  -Currently getting IV fluids LR and has not completed sepsis bolus  -Continue Zosyn  -Procalcitonin, CRP  -Hepatitis panel pending  -Follow-up blood cultures x2  -Blood Cx pending  -Daily CMP  -RUQ USG  -GGT pending     Lactic acidosis  LA:2.3 increased to 4.4  -Trend lactic acid every 6 hours  -Currently getting IV fluids    Hx of Hypertension  Pt now hypotensive  -Holding home antiHTN      DVT PPx: Lovenox   Diet: Diet NPO   Code status:  Full Code     ELOS: 2 nights   Barriers to discharge: Further evaluation of abdominal pain and fever  Disposition  - Preadmission: Home  - Current: IP  - Upon discharge: TBD    Will discuss with attending physician MD Ashley Arredondo MD  Internal Medicine, PGY-2    Addendum to Resident H& P/Progress note:  I have personally seen,examined and evaluated the patient.  I have reviewed the current history, physical findings, labs and assessment and plan and agree with note as documented by resident MD ( Dr.St Pop)  Admitting diagnosis: Sepsis secondary to ascending cholangitis versus acute cholecystitis. We will follow-up on the results of right upper quadrant ultrasound.   May require surgical consultation    Travon Cano MD, [address]

## 2023-02-08 NOTE — PLAN OF CARE
Problem: Discharge Planning  Goal: Discharge to home or other facility with appropriate resources  Outcome: Progressing     Problem: Pain  Goal: Verbalizes/displays adequate comfort level or baseline comfort level  Outcome: Progressing     Problem: Confusion  Goal: Confusion, delirium, dementia, or psychosis is improved or at baseline  Description: INTERVENTIONS:  1. Assess for possible contributors to thought disturbance, including medications, impaired vision or hearing, underlying metabolic abnormalities, dehydration, psychiatric diagnoses, and notify attending LIP  2. [place] high risk fall precautions, as indicated  3. Provide frequent short contacts to provide reality reorientation, refocusing and direction  4. Decrease environmental stimuli, including noise as appropriate  5. Monitor and intervene to maintain adequate nutrition, hydration, elimination, sleep and activity  6. If unable to ensure safety without constant attention obtain sitter and review sitter guidelines with assigned personnel  7. Initiate Psychosocial CNS and Spiritual Care consult, as indicated  Outcome: Progressing     No falls noted thus far this shift, bed in lowest position, alarm on, non-skid socks on, call light within reach, hourly checks, safety maintained, will continue to monitor.  [employer] safety camera in place     Problem: Safety - Adult  Goal: Free from fall injury  Outcome: Progressing

## 2023-02-08 NOTE — CARE COORDINATION
CM met with patient and wife at bedside. Pt is Independent, uses no DME, or HHC and is an active . Wife to transport at discharge. No Cmneeds anticipated. Please consult if needs arise.      Haylee Brink RN, BSN, [id number]  Case Management Department  [phone]

## 2023-02-08 NOTE — CONSULTS
General Surgery   Resident Consult Note    Reason for Consult: Dilated common bile duct     History of Present Illness:   Lior Gottlieb is a 68 y.o. male with Hx of HTN, CAD, remote MI s/p stenting, and history of a Ania-Cancino tear who presented to the ED yesterday with abdominal pain. Patient reports eating a bowel of cereal around 7:00 pm yesterday with a sudden onset of epigastric abdominal pain, severe nausea, and emesis around a hour later. He had approximately an hour of non-bloody emesis, then his pain resolved before both the emesis and pain returned about an hour later, at which point he presented to the ED with his wife. He was found on presentation to have elevated LFTs with normal bilirubin, lactic acid to 2.3, and an unremarkable CT scan. He became disoriented while in the CT, requiring administration of Haldol, which is a somewhat regular occurrence according to patient's wife. RUQ US this AM showed a dilated common bile duct to 12mm without stones, gallbladder wall thickening, and edema along the pancreas, for which general surgery was consulted. Today, patient reports his abdominal pain has completely resolved. He has had no additional episodes of nausea or vomiting. He is voiding appropriately and his last bowel movement was yesterday morning. He is oriented to person and time, but not place today and required cues from his wife in the room for the answers to some questions. He denies any previous abdominal surgeries. He currently takes ASA 81mg daily.      Past Medical History:        Diagnosis Date    CAD (coronary artery disease)     Hyperlipidemia     Hypertension     Keratosis, seborrheic     Lower GI bleed     Myocardial infarction ([address]     Sensorineural hearing loss of right ear     Shingles        Past Surgical History:        Procedure Laterality Date    CORONARY ANGIOPLASTY WITH STENT PLACEMENT      UPPER GASTROINTESTINAL ENDOSCOPY      received clipping for ania cancino tear Allergies:  Codeine and Sulfa antibiotics    Medications:   Home Meds  No current facility-administered medications on file prior to encounter.      Current Outpatient Medications on File Prior to Encounter   Medication Sig Dispense Refill    metFORMIN (GLUCOPHAGE) 1000 MG tablet TAKE 1 TABLET BY MOUTH TWICE A DAY WITH MEALS 180 tablet 3    metoprolol tartrate (LOPRESSOR) 25 MG tablet TAKE 1/2 TABLET TWICE DAILY 90 tablet 3    atorvastatin (LIPITOR) 80 MG tablet TAKE 1 TABLET EVERY DAY 90 tablet 3    ramipril (ALTACE) 5 MG capsule TAKE 1 CAPSULE EVERY DAY 90 capsule 3    nitroGLYCERIN (NITROSTAT) 0.4 MG SL tablet Place 1 tablet under the tongue every 5 minutes as needed (PRN) 25 tablet 5    esomeprazole (NEXIUM) 20 MG delayed release capsule Take 20 mg by mouth every morning (before breakfast)      aspirin 81 MG EC tablet Take 81 mg by mouth daily Take 1/2 tablet twice daily         Current Meds  aspirin EC tablet 81 mg, Daily  sodium chloride flush 0.9 % injection 5-40 mL, 2 times per day  sodium chloride flush 0.9 % injection 5-40 mL, PRN  0.9 % sodium chloride infusion, PRN  enoxaparin (LOVENOX) injection 40 mg, Daily  ondansetron (ZOFRAN-ODT) disintegrating tablet 4 mg, Q8H PRN   Or  ondansetron (ZOFRAN) injection 4 mg, Q6H PRN  polyethylene glycol (GLYCOLAX) packet 17 g, Daily PRN  acetaminophen (TYLENOL) tablet 650 mg, Q6H PRN   Or  acetaminophen (TYLENOL) suppository 650 mg, Q6H PRN  piperacillin-tazobactam (ZOSYN) 3,375 mg in sodium chloride 0.9 % 50 mL IVPB (mini-bag), q8h  0.9 % sodium chloride infusion, Continuous        Family History:   Family History   Problem Relation Age of Onset    Heart Disease Mother     Heart Failure Mother     Hypertension Mother     Diabetes Father     Heart Failure Father     Cancer Brother     Heart Disease Brother     Heart Disease Maternal Grandfather     Cancer Brother     Heart Disease Brother     Anemia Neg Hx     Arrhythmia Neg Hx     Asthma Neg Hx     Clotting Disorder Neg Hx     Fainting Neg Hx     Heart Attack Neg Hx     Heart Surgery Neg Hx     High Cholesterol Neg Hx     Pacemaker Neg Hx        Social History:   TOBACCO:   reports that he has never smoked. He has quit using smokeless tobacco.  ETOH:   reports no history of alcohol use. DRUGS:   reports no history of drug use. Review of Systems:   A 14 point review of systems was conducted, significant findings as noted in HPI. All other systems negative. Physical exam:    Vitals:    02/08/23 0534 02/08/23 0600 02/08/23 0845 02/08/23 1134   BP: (!) 97/52  132/69 126/69   Pulse: (!) 102 (!) 103 98 91   Resp: 18  18 18   Temp: 98.8 °F (37.1 °C)  98.3 °F (36.8 °C) 98.2 °F (36.8 °C)   TempSrc: Oral  Oral Oral   SpO2: 92%  97% 96%   Weight:       Height:           General appearance: alert, no acute distress, grooming appropriate  Eyes: No scleral icterus, EOM grossly intact  Neck: trachea midline, neck supple  Chest/Lungs: normal effort with no accessory muscle use on RA  Cardiovascular: RRR, well perfused  Abdomen: Soft, non-tender, non-distended, no rebound, guarding, or rigidity. Negative Pena's sign   Skin: warm and dry, no rashes  Extremities: no edema, no cyanosis  Genitourinary: Grossly normal  Neuro: A&Ox2, no focal deficits, sensation intact    Labs:    CBC:   Recent Labs     02/07/23 2359 02/08/23  0558   WBC 9.6 14.5*   HGB 12.6* 12.4*   HCT 37.0* 36.8*   MCV 92.9 93.3    203     BMP:   Recent Labs     02/07/23 2359 02/08/23  0558    136   K 4.0 4.1    96*   CO2 29 23   BUN 20 20   CREATININE 0.8 0.8     PT/INR:   Recent Labs     02/08/23  0559   PROTIME 13.5   INR 1.04     APTT: No results for input(s): APTT in the last 72 hours.   Liver Profile:   Lab Results   Component Value Date/Time     02/08/2023 05:58 AM     02/08/2023 05:58 AM    BILIDIR 0.3 02/07/2023 11:59 PM    BILITOT 0.9 02/08/2023 05:58 AM    ALKPHOS 233 02/08/2023 05:58 AM     02/08/2023 05:59 AM Lab Results   Component Value Date/Time    CHOL 192 01/22/2021 10:24 AM    HDL 40 01/22/2021 10:24 AM    TRIG 278 01/22/2021 10:24 AM     UA:   Lab Results   Component Value Date/Time    COLORU Yellow 02/07/2023 11:55 PM    PHUR 7.5 02/07/2023 11:55 PM    WBCUA None seen 06/11/2015 09:39 PM    RBCUA 0-2 06/11/2015 09:39 PM    BACTERIA Rare 06/11/2015 09:39 PM    CLARITYU Clear 02/07/2023 11:55 PM    SPECGRAV 1.020 02/07/2023 11:55 PM    LEUKOCYTESUR Negative 02/07/2023 11:55 PM    UROBILINOGEN 1.0 02/07/2023 11:55 PM    BILIRUBINUR Negative 02/07/2023 11:55 PM    BLOODU Negative 02/07/2023 11:55 PM    GLUCOSEU Negative 02/07/2023 11:55 PM       Imaging:   US ABDOMEN LIMITED   Final Result   1. Nonspecific gallbladder wall thickening without shadowing calculus or pain over the gallbladder. Cholecystitis is considered unlikely but cannot be entirely excluded. 2. Dilated common bile duct without definite obstructing abnormality. Consider further evaluation with MRCP or ERCP. 3. Equivocal edema along the pancreas which appeared normal on CT of the abdomen from earlier today. XR CHEST PORTABLE   Final Result   1. Mild left lower lobe atelectasis or infiltrate. CT ABDOMEN PELVIS W IV CONTRAST Additional Contrast? Oral   Final Result      No acute abdominopelvic abnormality. INCIDENTAL FINDINGS: None. Assessment/Plan: This is a 68 y.o. male with Hx of epigastric abdominal pain, nausea, and emesis in the setting of elevated LFT's without evidence of clear cholecystitis or stones on RUQ US. Today, he is afebrile and HDS with down trending lactic acid, but with an increase in his LFT's compared to yesterday.      - Continue NPO and IVF  - Continue to trend lactic acid   - Fractionated bilirubin ordered, will follow up   - GI consult   - MRCP  - Additional recommendations pending further workup   - Patient seen with senior resident and staffed with Dr. June Urbina, MD  02/08/23  1:18 PM     I have seen, examined, and reviewed the patients chart. I agree with the residents assessment and have made appropriate changes.     Robert Hodge

## 2023-02-08 NOTE — ED NOTES
Change in status. Pt now confused. Only alert to person. Unable to tell me the president, year, or where he is.   Is agitated and wanting to leave     Pallavi Everett RN  02/08/23 1141

## 2023-02-08 NOTE — PROGRESS NOTES
4 Eyes Admission Assessment     I agree as the admission nurse that 2 RN's have performed a thorough Head to Toe Skin Assessment on the patient. ALL assessment sites listed below have been assessed on admission. Areas assessed by both nurses: Sheldon Score  [x]   Head, Face, and Ears   [x]   Shoulders, Back, and Chest  [x]   Arms, Elbows, and Hands   [x]   Coccyx, Sacrum, and Ischium  [x]   Legs, Feet, and Heels        Does the Patient have Skin Breakdown?   No         [place] Prevention initiated:  No   Wound Care Orders initiated:  No      [facility] nurse consulted for Pressure Injury (Stage 3,4, Unstageable, DTI, NWPT, and Complex wounds) or [place] score 18 or lower:  No      Nurse 1 eSignature: Electronically signed by Diogo De Anda RN on 2/8/23 at 6:25 AM EST    **SHARE this note so that the co-signing nurse is able to place an eSignature**    Nurse 2 eSignature: Electronically signed by Shagufta Metzger RN on 2/8/23 at 5:43 AM EST

## 2023-02-08 NOTE — CONSULTS
Gastroenterology Consult Note      Patient: Jose Pinedo  : 1945  CSN#:      Date:  2023    Subjective:       History of Present Illness  Patient is a 68 y.o.  male PMhx of CAD, HTN, MI, Hx of Alicja james tear admitted with Abdominal pain, epigastric [R10.13]  Elevated liver enzymes [R74.8]  LFT elevation [R79.89]  Nausea and vomiting, unspecified vomiting type [R11.2] who is seen in consult for Elevated LFTS, RUQ Ultrasound without stones     presented to the ED due upper abdominal pain labs revealed lactic acid 2.3, LFTS were elevated, His ALT was 298, , , T-Bilirubin 1.0, Direct bilirubin 0.2 . CT abdomen pelvis revealed Mild periportal edema, US of abdomen revealed non-specific gall bladder wall thickening without shadowing calculus or pain over the gall bladder, Dilated common bile duct without significant abnormality. On my evaluation patient told me he was in his usual state of health on  when he started having Epigastric pain after he had a bowel of cereal with apple sauce, He tried torsten seltzer with no relief, He had several episodes of emesis prior to presentation to the hospital. On my evaluation his abdominal pain has resolved, He denies any nausea, Vomiting or had any episodes of emesis today. He denies using any Augmentin or NSAIDS or having any chronic pain.  He denies smoking or use of Etoh or any Recreational drugs       Past Medical History:   Diagnosis Date    CAD (coronary artery disease)     Hyperlipidemia     Hypertension     Keratosis, seborrheic     Lower GI bleed     Myocardial infarction (HCC)     Sensorineural hearing loss of right ear     Shingles       Past Surgical History:   Procedure Laterality Date    CORONARY ANGIOPLASTY WITH STENT PLACEMENT      UPPER GASTROINTESTINAL ENDOSCOPY      received clipping for alicja rice tear      Past Endoscopic History    Admission Meds  No current facility-administered medications on file prior to encounter. Current Outpatient Medications on File Prior to Encounter   Medication Sig Dispense Refill    metFORMIN (GLUCOPHAGE) 1000 MG tablet TAKE 1 TABLET BY MOUTH TWICE A DAY WITH MEALS 180 tablet 3    metoprolol tartrate (LOPRESSOR) 25 MG tablet TAKE 1/2 TABLET TWICE DAILY 90 tablet 3    atorvastatin (LIPITOR) 80 MG tablet TAKE 1 TABLET EVERY DAY 90 tablet 3    ramipril (ALTACE) 5 MG capsule TAKE 1 CAPSULE EVERY DAY 90 capsule 3    nitroGLYCERIN (NITROSTAT) 0.4 MG SL tablet Place 1 tablet under the tongue every 5 minutes as needed (PRN) 25 tablet 5    esomeprazole (NEXIUM) 20 MG delayed release capsule Take 20 mg by mouth every morning (before breakfast)      aspirin 81 MG EC tablet Take 81 mg by mouth daily Take 1/2 tablet twice daily         Patient denies ASA, NSAID use. Allergies  Allergies   Allergen Reactions    Codeine     Sulfa Antibiotics Nausea And Vomiting      Social   Social History     Tobacco Use    Smoking status: Never    Smokeless tobacco: Former   Substance Use Topics    Alcohol use: No        Family History   Problem Relation Age of Onset    Heart Disease Mother     Heart Failure Mother     Hypertension Mother     Diabetes Father     Heart Failure Father     Cancer Brother     Heart Disease Brother     Heart Disease Maternal Grandfather     Cancer Brother     Heart Disease Brother     Anemia Neg Hx     Arrhythmia Neg Hx     Asthma Neg Hx     Clotting Disorder Neg Hx     Fainting Neg Hx     Heart Attack Neg Hx     Heart Surgery Neg Hx     High Cholesterol Neg Hx     Pacemaker Neg Hx           Review of Systems  Pertinent items are noted in HPI. Physical Exam  Blood pressure 126/69, pulse 91, temperature 98.2 °F (36.8 °C), temperature source Oral, resp. rate 18, height 6' 1\" (1.854 m), weight 195 lb 3.2 oz (88.5 kg), SpO2 96 %.     General appearance: alert, cooperative, no distress, appears stated age  Eyes: Anicteric  Head: Normocephalic, without obvious abnormality  Lungs: clear to auscultation bilaterally, Normal Effort  Heart: regular rate and rhythm, normal S1 and S2, no murmurs or rubs  Abdomen: soft, non-tender. Bowel sounds normal. No masses,  no organomegaly. Extremities: atraumatic, no cyanosis or edema  Skin: warm and dry, no jaundice  Neuro: Grossly intact, A&OX3      Data Review:    Recent Labs     02/07/23 2359 02/08/23  0558   WBC 9.6 14.5*   HGB 12.6* 12.4*   HCT 37.0* 36.8*   MCV 92.9 93.3    203     Recent Labs     02/07/23 2359 02/08/23  0558    136   K 4.0 4.1    96*   CO2 29 23   BUN 20 20   CREATININE 0.8 0.8     Recent Labs     02/07/23 2359 02/08/23  0558 02/08/23  1454   * 440*  --    * 298*  --    BILIDIR 0.3  --  <0.2   BILITOT 0.9 0.9 1.0   ALKPHOS 213* 233*  --      Recent Labs     02/07/23 2359 02/08/23  0914   LIPASE 33.0 15.0     Recent Labs     02/08/23  0559   PROTIME 13.5   INR 1.04       Imaging Studies:                                         Ultrasound abdomen,  CT-scan of abdomen and pelvis  US ABDOMEN LIMITED   Final Result   1. Nonspecific gallbladder wall thickening without shadowing calculus or pain over the gallbladder. Cholecystitis is considered unlikely but cannot be entirely excluded. 2. Dilated common bile duct without definite obstructing abnormality. Consider further evaluation with MRCP or ERCP. 3. Equivocal edema along the pancreas which appeared normal on CT of the abdomen from earlier today. XR CHEST PORTABLE   Final Result   1. Mild left lower lobe atelectasis or infiltrate. CT ABDOMEN PELVIS W IV CONTRAST Additional Contrast? Oral   Final Result      No acute abdominopelvic abnormality. INCIDENTAL FINDINGS: None.       MRI ABDOMEN W WO CONTRAST MRCP    (Results Pending)                      Assessment:     Principal Problem:    Elevated liver enzymes  Active Problems:    Sepsis without acute organ dysfunction (HCC)    Abdominal pain, epigastric    LFT elevation    Lactic acidosis    Nausea and vomiting  Resolved Problems:    * No resolved hospital problems.  *    # Transaminitis with Dilated CBD   - Possibly CBD stone, Medications  - US of abdomen revealed non-specific gall bladder wall thickening without shadowing calculus or pain over the gall bladder, Dilated common bile duct without significant abnormality    Recommendations:   -Agree with MRCP,   -Acute hepatitis panel ordered     Patient seen examined discussed agree with note

## 2023-02-08 NOTE — PROGRESS NOTES
Progress Note    Admit Date: 2/7/2023  Day: 1  Diet: Diet NPO    CC: Abdominal pain    Interval history: Patient seen and examined this morning. He states he is doing well had just come from the bathroom. He states that his abdominal pain has since resolved. His wife was present at bedside and also states that his confusion has also resolved and he is back to baseline. He was getting ready to go down for his right upper quadrant ultrasound so we will follow-up on the read. Overall he seems to be doing much better. He remains hemodynamically stable and afebrile. HPI: Durga Lentz is a 68 y.o. male with a PMHx: HTN, CAD, DM, Prior MI who presented to the ED c/o of N/V and abdominal pain onset 4 hours prior to presentation. Per spouse at bedside he had 2 episodes of vomiting- non bilious, mainly stomach contents with no blood. After his second episode of emesis his abdominal pain improved. Patient described abdominal pain mainly in the epigastric region that initially resolved when he presented to the ED however it started to increase again. Last bowel movement noted to be same day of ED presentation. Due to patient having a prior MI wife reported that she was very concerned that he was having another 1 therefore she gave him a sublingual nitroglycerin tablet which did not improve his abdominal pain. In the ED patient reported that the pain felt much different from when he had his myocardial infarction. On presentation to the ED he was hemodynamically stable, CBC was unremarkable and BMP also unremarkable however CMP with elevated alkaline phosphatase, ALT/AST. Urinalysis was not revealing of any infection. CT abdomen overall revealed no acute abnormalities however there was some periportal edema seen. Per ED physician when he returned from his CT abdomen he was noted to be more confused and febrile at 100.6.   Per wife he does wax and wane with confusion over the past year and a half therefore she was not remarkably concerned. He was given a dose of Haldol 2.5 mg in the ED due to him being agitated and attempting to leave the emergency department. He was noted not to have decision-making capacity. Patient seen at bedside. Only alert to name however he Responded That He Does Not Know the Time of Year Alteplase. He is able to hold a conversation and answer questions concerning his symptoms. Wife reports that early on during the day he had a mild headache. He denies any chest pain, palpitations, diarrhea, dizziness or lightheadedness. He will be admitted for further evaluation of his elevated liver enzymes, confusion and new fever. Medications:     Scheduled Meds:   aspirin  81 mg Oral Daily    sodium chloride flush  5-40 mL IntraVENous 2 times per day    enoxaparin  40 mg SubCUTAneous Daily    piperacillin-tazobactam  3,375 mg IntraVENous q8h    magnesium sulfate  4,000 mg IntraVENous Once     Continuous Infusions:   sodium chloride      sodium chloride 125 mL/hr at 02/08/23 0810     PRN Meds:sodium chloride flush, sodium chloride, ondansetron **OR** ondansetron, polyethylene glycol, acetaminophen **OR** acetaminophen    Objective:   Vitals:   T-max:  Patient Vitals for the past 8 hrs:   BP Temp Temp src Pulse Resp SpO2   02/08/23 0845 132/69 98.3 °F (36.8 °C) Oral 98 18 97 %   02/08/23 0600 -- -- -- (!) 103 -- --   02/08/23 0534 (!) 97/52 98.8 °F (37.1 °C) Oral (!) 102 18 92 %   02/08/23 0500 (!) 113/48 99.1 °F (37.3 °C) Oral 90 -- 93 %   02/08/23 0230 -- -- -- -- -- 96 %   02/08/23 0220 (!) 152/82 (!) 100.6 °F (38.1 °C) -- (!) 125 -- 92 %       Intake/Output Summary (Last 24 hours) at 2/8/2023 1015  Last data filed at 2/8/2023 5170  Gross per 24 hour   Intake 1050.74 ml   Output 225 ml   Net 825.74 ml       Review of Systems   Constitutional: Negative. HENT: Negative. Eyes: Negative. Respiratory: Negative. Cardiovascular: Negative. Gastrointestinal: Negative. Genitourinary: Negative. Musculoskeletal: Negative. Skin: Negative. Neurological: Negative. Psychiatric/Behavioral: Negative. Physical Exam  Vitals reviewed. Constitutional:       Appearance: Normal appearance. He is normal weight. HENT:      Head: Normocephalic and atraumatic. Eyes:      Extraocular Movements: Extraocular movements intact. Conjunctiva/sclera: Conjunctivae normal.      Pupils: Pupils are equal, round, and reactive to light. Cardiovascular:      Rate and Rhythm: Normal rate and regular rhythm. Heart sounds: Normal heart sounds. Pulmonary:      Effort: Pulmonary effort is normal.      Breath sounds: Normal breath sounds. Abdominal:      General: Abdomen is flat. Bowel sounds are normal.      Palpations: Abdomen is soft. Musculoskeletal:         General: Normal range of motion. Skin:     General: Skin is warm and dry. Neurological:      General: No focal deficit present. Mental Status: He is alert and oriented to person, place, and time. Mental status is at baseline. Psychiatric:         Mood and Affect: Mood normal.         Behavior: Behavior normal.       LABS:    CBC:   Recent Labs     02/07/23 2359 02/08/23  0558   WBC 9.6 14.5*   HGB 12.6* 12.4*   HCT 37.0* 36.8*    203   MCV 92.9 93.3     Renal:    Recent Labs     02/07/23 2359 02/08/23  0558    136   K 4.0 4.1    96*   CO2 29 23   BUN 20 20   CREATININE 0.8 0.8   GLUCOSE 141* 156*   CALCIUM 9.4 9.5   MG  --  1.40*   ANIONGAP 10 17*     Hepatic:   Recent Labs     02/07/23 2359 02/08/23  0558   * 440*   * 298*   BILITOT 0.9 0.9   BILIDIR 0.3  --    PROT 6.1* 6.5   LABALBU 4.0 4.3   ALKPHOS 213* 233*     Troponin:   Recent Labs     02/08/23  0322   TROPONINI <0.01     BNP: No results for input(s): BNP in the last 72 hours. Lipids: No results for input(s): CHOL, HDL in the last 72 hours.     Invalid input(s): LDLCALCU, TRIGLYCERIDE  ABGs:  No results for input(s): PHART, DKP4SQI, PO2ART, TFL8OIK, BEART, THGBART, K3PADTWU, UBR0LEG in the last 72 hours. INR:   Recent Labs     02/08/23  0559   INR 1.04     Lactate: No results for input(s): LACTATE in the last 72 hours. Cultures:  -----------------------------------------------------------------  RAD:   US ABDOMEN LIMITED   Final Result   1. Nonspecific gallbladder wall thickening without shadowing calculus or pain over the gallbladder. Cholecystitis is considered unlikely but cannot be entirely excluded. 2. Dilated common bile duct without definite obstructing abnormality. Consider further evaluation with MRCP or ERCP. 3. Equivocal edema along the pancreas which appeared normal on CT of the abdomen from earlier today. XR CHEST PORTABLE   Final Result   1. Mild left lower lobe atelectasis or infiltrate. CT ABDOMEN PELVIS W IV CONTRAST Additional Contrast? Oral   Final Result      No acute abdominopelvic abnormality. INCIDENTAL FINDINGS: None. Assessment/Plan:   Abdominal pain   Elevated liver enzymes  CT abdomen and pelvis only revealing mild periportal edema  Patient with no leukocytosis and jaundice however during ED stay he became altered with an episode of hypotension and febrile. Moderate elevation in AST and ALT 5x ULN. He likely had a gallstone which already passed indicated by elevated alk phos but normal bilirubin. Received Zosyn in the ED  -Continue Zosyn  -Procalcitonin elevated at 5.9, CRP normal at 3.9, and total CK was 252  -Hepatitis panel pending  -Follow-up blood cultures x2  -Daily CMP  -RUQ done this morning, follow-up on read  -GGT pending   -Lipase ordered  -Repeat liver enzymes tomorrow    Sepsis, most probably, secondary to acute cholecystitis versus ascending cholangitis  Patient presenting with elevated temperature and tachycardia but no clear source of infection. Received 1 L bolus in the ED and started on IV fluid  -Mild leukocytosis up to 14.5.   Patient remains afebrile  -Trend WBC with daily CBC     Lactic acidosis  Patient received a 1 L LR bolus in the ED.  -LA 2.3->4.4->2.2  -Trend lactic acid every 6 hours  -Continue on IV fluids     Hx of Hypertension  Pt now hypotensive  -Holding home antiHTN       Code Status: Full code  FEN: Regular diet  PPX: Lovenox  DISPO: [facility]    Triny Ruiz DO PGY-1  02/08/23  10:15 AM    This patient has been staffed and discussed with Remi Ackerman MD.     Addendum to Resident H& P/Progress note:  I have personally seen,examined and evaluated the patient.  I have reviewed the current history, physical findings, labs and assessment and plan and agree with note as documented by resident DO ( Kyle Eid)      Remi Ackerman MD, [address]

## 2023-02-09 ENCOUNTER — ANESTHESIA EVENT (OUTPATIENT)
Dept: ENDOSCOPY | Age: 78
End: 2023-02-09
Payer: MEDICARE

## 2023-02-09 ENCOUNTER — ANESTHESIA (OUTPATIENT)
Dept: ENDOSCOPY | Age: 78
End: 2023-02-09
Payer: MEDICARE

## 2023-02-09 ENCOUNTER — APPOINTMENT (OUTPATIENT)
Dept: GENERAL RADIOLOGY | Age: 78
DRG: 872 | End: 2023-02-09
Payer: MEDICARE

## 2023-02-09 PROBLEM — K83.09 ASCENDING CHOLANGITIS: Status: ACTIVE | Noted: 2023-02-09

## 2023-02-09 LAB
A/G RATIO: 1.3 (ref 1.1–2.2)
ALBUMIN SERPL-MCNC: 3.2 G/DL (ref 3.4–5)
ALP BLD-CCNC: 173 U/L (ref 40–129)
ALT SERPL-CCNC: 156 U/L (ref 10–40)
ANION GAP SERPL CALCULATED.3IONS-SCNC: 13 MMOL/L (ref 3–16)
AST SERPL-CCNC: 110 U/L (ref 15–37)
BASOPHILS ABSOLUTE: 0 K/UL (ref 0–0.2)
BASOPHILS RELATIVE PERCENT: 0.2 %
BILIRUB SERPL-MCNC: 0.9 MG/DL (ref 0–1)
BILIRUB SERPL-MCNC: 0.9 MG/DL (ref 0–1)
BILIRUBIN DIRECT: <0.2 MG/DL (ref 0–0.3)
BILIRUBIN, INDIRECT: NORMAL MG/DL (ref 0–1)
BUN BLDV-MCNC: 18 MG/DL (ref 7–20)
CALCIUM SERPL-MCNC: 8.6 MG/DL (ref 8.3–10.6)
CHLORIDE BLD-SCNC: 102 MMOL/L (ref 99–110)
CO2: 23 MMOL/L (ref 21–32)
CREAT SERPL-MCNC: 0.8 MG/DL (ref 0.8–1.3)
EOSINOPHILS ABSOLUTE: 0 K/UL (ref 0–0.6)
EOSINOPHILS RELATIVE PERCENT: 0 %
GFR SERPL CREATININE-BSD FRML MDRD: >60 ML/MIN/{1.73_M2}
GLUCOSE BLD-MCNC: 120 MG/DL (ref 70–99)
GLUCOSE BLD-MCNC: 126 MG/DL (ref 70–99)
HAV IGM SER IA-ACNC: NORMAL
HCT VFR BLD CALC: 37.5 % (ref 40.5–52.5)
HEMOGLOBIN: 12.3 G/DL (ref 13.5–17.5)
HEPATITIS B CORE IGM ANTIBODY: NORMAL
HEPATITIS B SURFACE ANTIGEN INTERPRETATION: NORMAL
HEPATITIS C ANTIBODY INTERPRETATION: NORMAL
LYMPHOCYTES ABSOLUTE: 0.6 K/UL (ref 1–5.1)
LYMPHOCYTES RELATIVE PERCENT: 6.4 %
MAGNESIUM: 2 MG/DL (ref 1.8–2.4)
MCH RBC QN AUTO: 31.1 PG (ref 26–34)
MCHC RBC AUTO-ENTMCNC: 32.8 G/DL (ref 31–36)
MCV RBC AUTO: 94.9 FL (ref 80–100)
MONOCYTES ABSOLUTE: 0.4 K/UL (ref 0–1.3)
MONOCYTES RELATIVE PERCENT: 4.9 %
NEUTROPHILS ABSOLUTE: 7.9 K/UL (ref 1.7–7.7)
NEUTROPHILS RELATIVE PERCENT: 88.5 %
PDW BLD-RTO: 14 % (ref 12.4–15.4)
PERFORMED ON: ABNORMAL
PHOSPHORUS: 2.9 MG/DL (ref 2.5–4.9)
PLATELET # BLD: 185 K/UL (ref 135–450)
PMV BLD AUTO: 8.4 FL (ref 5–10.5)
POTASSIUM SERPL-SCNC: 4.3 MMOL/L (ref 3.5–5.1)
RBC # BLD: 3.95 M/UL (ref 4.2–5.9)
REPORT: NORMAL
SODIUM BLD-SCNC: 138 MMOL/L (ref 136–145)
TOTAL PROTEIN: 5.6 G/DL (ref 6.4–8.2)
WBC # BLD: 8.9 K/UL (ref 4–11)

## 2023-02-09 PROCEDURE — 0F798ZZ DILATION OF COMMON BILE DUCT, VIA NATURAL OR ARTIFICIAL OPENING ENDOSCOPIC: ICD-10-PCS | Performed by: INTERNAL MEDICINE

## 2023-02-09 PROCEDURE — 1200000000 HC SEMI PRIVATE

## 2023-02-09 PROCEDURE — 99232 SBSQ HOSP IP/OBS MODERATE 35: CPT | Performed by: SURGERY

## 2023-02-09 PROCEDURE — 3609014900 HC ERCP W/SPHINCTEROTOMY &/OR PAPILLOTOMY: Performed by: INTERNAL MEDICINE

## 2023-02-09 PROCEDURE — 84100 ASSAY OF PHOSPHORUS: CPT

## 2023-02-09 PROCEDURE — 51798 US URINE CAPACITY MEASURE: CPT

## 2023-02-09 PROCEDURE — 6360000002 HC RX W HCPCS: Performed by: STUDENT IN AN ORGANIZED HEALTH CARE EDUCATION/TRAINING PROGRAM

## 2023-02-09 PROCEDURE — 6360000002 HC RX W HCPCS: Performed by: NURSE ANESTHETIST, CERTIFIED REGISTERED

## 2023-02-09 PROCEDURE — BF101ZZ FLUOROSCOPY OF BILE DUCTS USING LOW OSMOLAR CONTRAST: ICD-10-PCS | Performed by: INTERNAL MEDICINE

## 2023-02-09 PROCEDURE — 6370000000 HC RX 637 (ALT 250 FOR IP): Performed by: STUDENT IN AN ORGANIZED HEALTH CARE EDUCATION/TRAINING PROGRAM

## 2023-02-09 PROCEDURE — 2580000003 HC RX 258: Performed by: NURSE ANESTHETIST, CERTIFIED REGISTERED

## 2023-02-09 PROCEDURE — 6370000000 HC RX 637 (ALT 250 FOR IP)

## 2023-02-09 PROCEDURE — 36415 COLL VENOUS BLD VENIPUNCTURE: CPT

## 2023-02-09 PROCEDURE — 83735 ASSAY OF MAGNESIUM: CPT

## 2023-02-09 PROCEDURE — 2709999900 HC NON-CHARGEABLE SUPPLY: Performed by: INTERNAL MEDICINE

## 2023-02-09 PROCEDURE — 2500000003 HC RX 250 WO HCPCS: Performed by: NURSE ANESTHETIST, CERTIFIED REGISTERED

## 2023-02-09 PROCEDURE — 74328 X-RAY BILE DUCT ENDOSCOPY: CPT

## 2023-02-09 PROCEDURE — 2580000003 HC RX 258: Performed by: INTERNAL MEDICINE

## 2023-02-09 PROCEDURE — 3700000001 HC ADD 15 MINUTES (ANESTHESIA): Performed by: INTERNAL MEDICINE

## 2023-02-09 PROCEDURE — 3609015200 HC ERCP REMOVE CALCULI/DEBRIS BILIARY/PANCREAS DUCT: Performed by: INTERNAL MEDICINE

## 2023-02-09 PROCEDURE — 3700000000 HC ANESTHESIA ATTENDED CARE: Performed by: INTERNAL MEDICINE

## 2023-02-09 PROCEDURE — 7100000000 HC PACU RECOVERY - FIRST 15 MIN: Performed by: INTERNAL MEDICINE

## 2023-02-09 PROCEDURE — 85025 COMPLETE CBC W/AUTO DIFF WBC: CPT

## 2023-02-09 PROCEDURE — 2580000003 HC RX 258: Performed by: PHYSICIAN ASSISTANT

## 2023-02-09 PROCEDURE — 80053 COMPREHEN METABOLIC PANEL: CPT

## 2023-02-09 PROCEDURE — 82248 BILIRUBIN DIRECT: CPT

## 2023-02-09 PROCEDURE — C9113 INJ PANTOPRAZOLE SODIUM, VIA: HCPCS | Performed by: STUDENT IN AN ORGANIZED HEALTH CARE EDUCATION/TRAINING PROGRAM

## 2023-02-09 PROCEDURE — 6360000002 HC RX W HCPCS: Performed by: INTERNAL MEDICINE

## 2023-02-09 PROCEDURE — 99232 SBSQ HOSP IP/OBS MODERATE 35: CPT | Performed by: HOSPITALIST

## 2023-02-09 PROCEDURE — 7100000001 HC PACU RECOVERY - ADDTL 15 MIN: Performed by: INTERNAL MEDICINE

## 2023-02-09 PROCEDURE — 2580000003 HC RX 258: Performed by: STUDENT IN AN ORGANIZED HEALTH CARE EDUCATION/TRAINING PROGRAM

## 2023-02-09 PROCEDURE — C1769 GUIDE WIRE: HCPCS | Performed by: INTERNAL MEDICINE

## 2023-02-09 PROCEDURE — 2720000010 HC SURG SUPPLY STERILE: Performed by: INTERNAL MEDICINE

## 2023-02-09 PROCEDURE — 82247 BILIRUBIN TOTAL: CPT

## 2023-02-09 PROCEDURE — 2500000003 HC RX 250 WO HCPCS: Performed by: STUDENT IN AN ORGANIZED HEALTH CARE EDUCATION/TRAINING PROGRAM

## 2023-02-09 RX ORDER — LANOLIN ALCOHOL/MO/W.PET/CERES
3 CREAM (GRAM) TOPICAL NIGHTLY
Status: DISCONTINUED | OUTPATIENT
Start: 2023-02-09 | End: 2023-02-11 | Stop reason: HOSPADM

## 2023-02-09 RX ORDER — METOCLOPRAMIDE HYDROCHLORIDE 5 MG/ML
10 INJECTION INTRAMUSCULAR; INTRAVENOUS
Status: DISCONTINUED | OUTPATIENT
Start: 2023-02-09 | End: 2023-02-09 | Stop reason: HOSPADM

## 2023-02-09 RX ORDER — TAMSULOSIN HYDROCHLORIDE 0.4 MG/1
0.4 CAPSULE ORAL DAILY
Status: DISCONTINUED | OUTPATIENT
Start: 2023-02-09 | End: 2023-02-11 | Stop reason: HOSPADM

## 2023-02-09 RX ORDER — SODIUM CHLORIDE 0.9 % (FLUSH) 0.9 %
5-40 SYRINGE (ML) INJECTION PRN
Status: DISCONTINUED | OUTPATIENT
Start: 2023-02-09 | End: 2023-02-09 | Stop reason: HOSPADM

## 2023-02-09 RX ORDER — HALOPERIDOL 5 MG/ML
2 INJECTION INTRAMUSCULAR ONCE
Status: DISCONTINUED | OUTPATIENT
Start: 2023-02-09 | End: 2023-02-09

## 2023-02-09 RX ORDER — LABETALOL HYDROCHLORIDE 5 MG/ML
10 INJECTION, SOLUTION INTRAVENOUS
Status: DISCONTINUED | OUTPATIENT
Start: 2023-02-09 | End: 2023-02-09 | Stop reason: HOSPADM

## 2023-02-09 RX ORDER — SUCCINYLCHOLINE/SOD CL,ISO/PF 100 MG/5ML
SYRINGE (ML) INTRAVENOUS PRN
Status: DISCONTINUED | OUTPATIENT
Start: 2023-02-09 | End: 2023-02-09 | Stop reason: SDUPTHER

## 2023-02-09 RX ORDER — DIPHENHYDRAMINE HYDROCHLORIDE 50 MG/ML
12.5 INJECTION INTRAMUSCULAR; INTRAVENOUS
Status: DISCONTINUED | OUTPATIENT
Start: 2023-02-09 | End: 2023-02-09 | Stop reason: HOSPADM

## 2023-02-09 RX ORDER — ONDANSETRON 2 MG/ML
INJECTION INTRAMUSCULAR; INTRAVENOUS PRN
Status: DISCONTINUED | OUTPATIENT
Start: 2023-02-09 | End: 2023-02-09 | Stop reason: SDUPTHER

## 2023-02-09 RX ORDER — HYDRALAZINE HYDROCHLORIDE 20 MG/ML
10 INJECTION INTRAMUSCULAR; INTRAVENOUS
Status: DISCONTINUED | OUTPATIENT
Start: 2023-02-09 | End: 2023-02-09 | Stop reason: HOSPADM

## 2023-02-09 RX ORDER — ROCURONIUM BROMIDE 10 MG/ML
INJECTION, SOLUTION INTRAVENOUS PRN
Status: DISCONTINUED | OUTPATIENT
Start: 2023-02-09 | End: 2023-02-09 | Stop reason: SDUPTHER

## 2023-02-09 RX ORDER — LANOLIN ALCOHOL/MO/W.PET/CERES
3 CREAM (GRAM) TOPICAL ONCE
Status: COMPLETED | OUTPATIENT
Start: 2023-02-09 | End: 2023-02-09

## 2023-02-09 RX ORDER — FENTANYL CITRATE 50 UG/ML
INJECTION, SOLUTION INTRAMUSCULAR; INTRAVENOUS PRN
Status: DISCONTINUED | OUTPATIENT
Start: 2023-02-09 | End: 2023-02-09 | Stop reason: SDUPTHER

## 2023-02-09 RX ORDER — SODIUM CHLORIDE 9 MG/ML
INJECTION, SOLUTION INTRAVENOUS CONTINUOUS PRN
Status: DISCONTINUED | OUTPATIENT
Start: 2023-02-09 | End: 2023-02-09 | Stop reason: SDUPTHER

## 2023-02-09 RX ORDER — PROPOFOL 10 MG/ML
INJECTION, EMULSION INTRAVENOUS PRN
Status: DISCONTINUED | OUTPATIENT
Start: 2023-02-09 | End: 2023-02-09 | Stop reason: SDUPTHER

## 2023-02-09 RX ORDER — HALOPERIDOL 5 MG/ML
2 INJECTION INTRAMUSCULAR
Status: COMPLETED | OUTPATIENT
Start: 2023-02-09 | End: 2023-02-09

## 2023-02-09 RX ORDER — OLANZAPINE 10 MG/1
5 INJECTION, POWDER, LYOPHILIZED, FOR SOLUTION INTRAMUSCULAR ONCE
Status: COMPLETED | OUTPATIENT
Start: 2023-02-09 | End: 2023-02-09

## 2023-02-09 RX ORDER — SODIUM CHLORIDE 0.9 % (FLUSH) 0.9 %
5-40 SYRINGE (ML) INJECTION EVERY 12 HOURS SCHEDULED
Status: DISCONTINUED | OUTPATIENT
Start: 2023-02-09 | End: 2023-02-09 | Stop reason: HOSPADM

## 2023-02-09 RX ORDER — LIDOCAINE HYDROCHLORIDE 20 MG/ML
INJECTION, SOLUTION EPIDURAL; INFILTRATION; INTRACAUDAL; PERINEURAL PRN
Status: DISCONTINUED | OUTPATIENT
Start: 2023-02-09 | End: 2023-02-09 | Stop reason: SDUPTHER

## 2023-02-09 RX ORDER — MEPERIDINE HYDROCHLORIDE 25 MG/ML
12.5 INJECTION INTRAMUSCULAR; INTRAVENOUS; SUBCUTANEOUS EVERY 5 MIN PRN
Status: DISCONTINUED | OUTPATIENT
Start: 2023-02-09 | End: 2023-02-09 | Stop reason: HOSPADM

## 2023-02-09 RX ORDER — SODIUM CHLORIDE 9 MG/ML
25 INJECTION, SOLUTION INTRAVENOUS PRN
Status: DISCONTINUED | OUTPATIENT
Start: 2023-02-09 | End: 2023-02-09 | Stop reason: HOSPADM

## 2023-02-09 RX ORDER — OLANZAPINE 5 MG/1
5 TABLET ORAL NIGHTLY
Status: CANCELLED | OUTPATIENT
Start: 2023-02-09

## 2023-02-09 RX ADMIN — SODIUM CHLORIDE, PRESERVATIVE FREE 10 ML: 5 INJECTION INTRAVENOUS at 08:26

## 2023-02-09 RX ADMIN — ROCURONIUM BROMIDE 35 MG: 10 INJECTION INTRAVENOUS at 14:01

## 2023-02-09 RX ADMIN — PROPOFOL 200 MG: 10 INJECTION, EMULSION INTRAVENOUS at 13:53

## 2023-02-09 RX ADMIN — SODIUM CHLORIDE: 9 INJECTION, SOLUTION INTRAVENOUS at 20:41

## 2023-02-09 RX ADMIN — OLANZAPINE 5 MG: 10 INJECTION, POWDER, FOR SOLUTION INTRAMUSCULAR at 04:24

## 2023-02-09 RX ADMIN — PHENYLEPHRINE HYDROCHLORIDE 100 MCG: 10 INJECTION, SOLUTION INTRAMUSCULAR; INTRAVENOUS; SUBCUTANEOUS at 14:19

## 2023-02-09 RX ADMIN — SODIUM CHLORIDE: 9 INJECTION, SOLUTION INTRAVENOUS at 16:48

## 2023-02-09 RX ADMIN — PIPERACILLIN AND TAZOBACTAM 3375 MG: 3; .375 INJECTION, POWDER, LYOPHILIZED, FOR SOLUTION INTRAVENOUS at 10:59

## 2023-02-09 RX ADMIN — PHENYLEPHRINE HYDROCHLORIDE 200 MCG: 10 INJECTION, SOLUTION INTRAMUSCULAR; INTRAVENOUS; SUBCUTANEOUS at 14:16

## 2023-02-09 RX ADMIN — ONDANSETRON 4 MG: 2 INJECTION INTRAMUSCULAR; INTRAVENOUS at 14:09

## 2023-02-09 RX ADMIN — SUGAMMADEX 200 MG: 100 INJECTION, SOLUTION INTRAVENOUS at 14:27

## 2023-02-09 RX ADMIN — PANTOPRAZOLE SODIUM 40 MG: 40 INJECTION, POWDER, LYOPHILIZED, FOR SOLUTION INTRAVENOUS at 08:26

## 2023-02-09 RX ADMIN — SODIUM CHLORIDE, PRESERVATIVE FREE 10 ML: 5 INJECTION INTRAVENOUS at 02:13

## 2023-02-09 RX ADMIN — TAMSULOSIN HYDROCHLORIDE 0.4 MG: 0.4 CAPSULE ORAL at 12:23

## 2023-02-09 RX ADMIN — HALOPERIDOL LACTATE 2 MG: 5 INJECTION, SOLUTION INTRAMUSCULAR at 23:08

## 2023-02-09 RX ADMIN — Medication 3 MG: at 01:59

## 2023-02-09 RX ADMIN — ENOXAPARIN SODIUM 40 MG: 100 INJECTION SUBCUTANEOUS at 17:11

## 2023-02-09 RX ADMIN — SODIUM CHLORIDE: 9 INJECTION, SOLUTION INTRAVENOUS at 12:08

## 2023-02-09 RX ADMIN — FENTANYL CITRATE 50 MCG: 50 INJECTION, SOLUTION INTRAMUSCULAR; INTRAVENOUS at 13:53

## 2023-02-09 RX ADMIN — LIDOCAINE HYDROCHLORIDE 100 MG: 20 INJECTION, SOLUTION EPIDURAL; INFILTRATION; INTRACAUDAL; PERINEURAL at 13:53

## 2023-02-09 RX ADMIN — ROCURONIUM BROMIDE 5 MG: 10 INJECTION INTRAVENOUS at 13:53

## 2023-02-09 RX ADMIN — Medication 140 MG: at 13:53

## 2023-02-09 RX ADMIN — SODIUM CHLORIDE: 9 INJECTION, SOLUTION INTRAVENOUS at 13:46

## 2023-02-09 RX ADMIN — OLANZAPINE 2.5 MG: 10 INJECTION, POWDER, FOR SOLUTION INTRAMUSCULAR at 00:23

## 2023-02-09 RX ADMIN — PIPERACILLIN AND TAZOBACTAM 3375 MG: 3; .375 INJECTION, POWDER, LYOPHILIZED, FOR SOLUTION INTRAVENOUS at 01:59

## 2023-02-09 RX ADMIN — HYDROMORPHONE HYDROCHLORIDE 1 MG: 1 INJECTION, SOLUTION INTRAMUSCULAR; INTRAVENOUS; SUBCUTANEOUS at 22:52

## 2023-02-09 RX ADMIN — PIPERACILLIN AND TAZOBACTAM 3375 MG: 3; .375 INJECTION, POWDER, LYOPHILIZED, FOR SOLUTION INTRAVENOUS at 19:37

## 2023-02-09 ASSESSMENT — PAIN SCALES - PAIN ASSESSMENT IN ADVANCED DEMENTIA (PAINAD)
TOTALSCORE: 6
BODYLANGUAGE: 2
BREATHING: 0
CONSOLABILITY: 2
FACIALEXPRESSION: 0
NEGVOCALIZATION: 2

## 2023-02-09 ASSESSMENT — PAIN SCALES - GENERAL
PAINLEVEL_OUTOF10: 6
PAINLEVEL_OUTOF10: 0

## 2023-02-09 ASSESSMENT — PAIN - FUNCTIONAL ASSESSMENT: PAIN_FUNCTIONAL_ASSESSMENT: NONE - DENIES PAIN

## 2023-02-09 NOTE — ANESTHESIA PRE PROCEDURE
Department of Anesthesiology  Preprocedure Note       Name:  Kemi Gamino   Age:  68 y.o.  :  1945                                          MRN:  [id number]         Date:  2023      Surgeon: Edna Coleman):  Christal Cespedes MD    Procedure: Procedure(s):  ERCP DIAGNOSTIC    Medications prior to admission:   Prior to Admission medications    Medication Sig Start Date End Date Taking?  Authorizing Provider   metFORMIN (GLUCOPHAGE) 1000 MG tablet TAKE 1 TABLET BY MOUTH TWICE A DAY WITH MEALS 22   Guy Chadwick MD   metoprolol tartrate (LOPRESSOR) 25 MG tablet TAKE 1/2 TABLET TWICE DAILY 22   Delorise Crown, APRN - CNP   atorvastatin (LIPITOR) 80 MG tablet TAKE 1 TABLET EVERY DAY 22   Delorise Crown, APRN - CNP   ramipril (ALTACE) 5 MG capsule TAKE 1 CAPSULE EVERY DAY 22   Delorise Crown, APRN - CNP   nitroGLYCERIN (NITROSTAT) 0.4 MG SL tablet Place 1 tablet under the tongue every 5 minutes as needed (PRN) 21   Kaylan Silverman MD   esomeprazole (NEXIUM) 20 MG delayed release capsule Take 20 mg by mouth every morning (before breakfast)    Historical Provider, MD   aspirin 81 MG EC tablet Take 81 mg by mouth daily Take 1/2 tablet twice daily    Historical Provider, MD       Current medications:    Current Facility-Administered Medications   Medication Dose Route Frequency Provider Last Rate Last Admin    melatonin tablet 3 mg  3 mg Oral Nightly Sudeep Gtz MD        haloperidol lactate (HALDOL) injection 2 mg  2 mg IntraMUSCular Once PRN Sudeep Gtz MD        tamsulosin [facility]) capsule 0.4 mg  0.4 mg Oral Daily Jamie Navarro DO   0.4 mg at 23 1223    [Held by provider] aspirin EC tablet 81 mg  81 mg Oral Daily Smitha Pop MD   81 mg at 23 9707    sodium chloride flush 0.9 % injection 5-40 mL  5-40 mL IntraVENous 2 times per day Sudeep Gtz MD   10 mL at 23 0826    sodium chloride flush 0.9 % injection 5-40 mL  5-40 mL IntraVENous PRN Monika Anton MD        0.9 % sodium chloride infusion   IntraVENous PRN Monika Anton MD        enoxaparin (LOVENOX) injection 40 mg  40 mg SubCUTAneous Daily Monika Anton MD   40 mg at 02/08/23 0849    ondansetron (ZOFRAN-ODT) disintegrating tablet 4 mg  4 mg Oral Q8H PRN Monika Anton MD        Or    [facility]ron [facility]) injection 4 mg  4 mg IntraVENous Q6H PRN Monika Anton MD        polyethylene glycol (GLYCOLAX) packet 17 g  17 g Oral Daily PRN Monika Anton MD        acetaminophen (TYLENOL) tablet 650 mg  650 mg Oral Q6H PRN Monika Anton MD        Or    acetaminophen (TYLENOL) suppository 650 mg  650 mg Rectal Q6H PRN Monika Anton MD        piperacillin-tazobactam (ZOSYN) 3,375 mg in sodium chloride 0.9 % 50 mL IVPB (mini-bag)  3,375 mg IntraVENous q8h Monika Anton MD 12.5 mL/hr at 02/09/23 1059 3,375 mg at 02/09/23 1059    0.9 % sodium chloride infusion   IntraVENous Continuous Venessa Ordaz  mL/hr at 02/09/23 1208 New Bag at 02/09/23 1208    pantoprazole (PROTONIX) injection 40 mg  40 mg IntraVENous Daily Christopher Alfaro MD   40 mg at 02/09/23 3094       Allergies:     Allergies   Allergen Reactions    Codeine     Sulfa Antibiotics Nausea And Vomiting       Problem List:    Patient Active Problem List   Diagnosis Code    HLD (hyperlipidemia) E78.5    Essential hypertension, benign I10    Old myocardial infarction I25.2    Coronary atherosclerosis of native coronary artery I25.10    Respiratory failure (HCC) J96.90    Hemorrhagic shock (HCC) R57.8    Alicja-Cancino tear K22.6    Pre-syncope R55    GI bleed K92.2    History of lower GI bleeding Z87.19    Encounter to establish care Z76.89    Tinea corporis B35.4    Skin lesion L98.9    Hyperglycemia R73.9    Right upper quadrant abdominal pain R10.11    Elevated liver enzymes R74.8    Sepsis without acute organ dysfunction (HCC) A41.9    Abdominal pain, epigastric R10.13    LFT elevation R79.89    Lactic acidosis E87.20    Nausea and vomiting R11.2    Ascending cholangitis K83.09       Past Medical History:        Diagnosis Date    CAD (coronary artery disease)     Hyperlipidemia     Hypertension     Lower GI bleed     Myocardial infarction (HCC)     Sensorineural hearing loss of right ear     Shingles        Past Surgical History:        Procedure Laterality Date    CORONARY ANGIOPLASTY WITH STENT PLACEMENT      UPPER GASTROINTESTINAL ENDOSCOPY      received clipping for ania rice tear       Social History:    Social History     Tobacco Use    Smoking status: Never    Smokeless tobacco: Former   Substance Use Topics    Alcohol use: No                                Counseling given: Not Answered      Vital Signs (Current):   Vitals:    02/09/23 0604 02/09/23 0818 02/09/23 0821 02/09/23 1202   BP: (!) 156/74 137/67  (!) 150/69   Pulse: 90 80 80 76   Resp: 18 18  18   Temp: 98.6 °F (37 °C) 98.5 °F (36.9 °C)  98.6 °F (37 °C)   TempSrc: Axillary Oral  Oral   SpO2: 96% 95%  94%   Weight:       Height:                                                  BP Readings from Last 3 Encounters:   02/09/23 (!) 150/69   11/02/22 (!) 174/92   05/04/22 132/74       NPO Status:                                                                                 BMI:   Wt Readings from Last 3 Encounters:   02/09/23 180 lb 12.4 oz (82 kg)   02/08/23 195 lb (88.5 kg)   11/02/22 192 lb 12.8 oz (87.5 kg)     Body mass index is 23.85 kg/m².     CBC:   Lab Results   Component Value Date/Time    WBC 8.9 02/09/2023 08:25 AM    RBC 3.95 02/09/2023 08:25 AM    HGB 12.3 02/09/2023 08:25 AM    HCT 37.5 02/09/2023 08:25 AM    MCV 94.9 02/09/2023 08:25 AM    RDW 14.0 02/09/2023 08:25 AM     02/09/2023 08:25 AM       CMP:   Lab Results   Component Value Date/Time     02/09/2023 08:30 AM    K 4.3 02/09/2023 08:30 AM     02/09/2023 08:30 AM    CO2 23 02/09/2023 08:30 AM    BUN 18 02/09/2023 08:30 AM    CREATININE 0.8 02/09/2023 08:30 AM    GFRAA >60 01/22/2021 10:24 AM    AGRATIO 1.3 02/09/2023 08:30 AM    LABGLOM >60 02/09/2023 08:30 AM    GLUCOSE 126 02/09/2023 08:30 AM    PROT 5.6 02/09/2023 08:30 AM    CALCIUM 8.6 02/09/2023 08:30 AM    BILITOT 0.9 02/09/2023 08:30 AM    BILITOT 0.9 02/09/2023 08:30 AM    ALKPHOS 173 02/09/2023 08:30 AM     02/09/2023 08:30 AM     02/09/2023 08:30 AM       POC Tests:   Recent Labs     02/09/23  0038   POCGLU 120*       Coags:   Lab Results   Component Value Date/Time    PROTIME 13.5 02/08/2023 05:59 AM    INR 1.04 02/08/2023 05:59 AM    APTT 20.9 06/12/2015 03:56 AM       HCG (If Applicable): No results found for: PREGTESTUR, PREGSERUM, HCG, HCGQUANT     ABGs:   Lab Results   Component Value Date/Time    PHART 7.28 06/12/2015 07:08 AM    PO2ART 365 06/12/2015 07:08 AM    UNE6FAA 44 06/12/2015 07:08 AM    TFI4VLX 20 06/12/2015 07:08 AM    BEART -6.0 06/12/2015 07:08 AM    F1BBCTTG 100 06/12/2015 07:08 AM        Type & Screen (If Applicable):  No results found for: LABABO, LABRH    Drug/Infectious Status (If Applicable):  No results found for: HIV, HEPCAB    COVID-19 Screening (If Applicable):   Lab Results   Component Value Date/Time    COVID19 NOT DETECTED 02/08/2023 06:00 AM    COVID19 NOT DETECTED 12/15/2020 08:48 PM    COVID19 DETECTED 11/24/2020 09:06 AM           Anesthesia Evaluation  Patient summary reviewed and Nursing notes reviewed no history of anesthetic complications:   Airway: Mallampati: II  TM distance: >3 FB   Neck ROM: full  Mouth opening: > = 3 FB   Dental:    (+) poor dentition      Pulmonary:Negative Pulmonary ROS                              Cardiovascular:    (+) hypertension:, past MI:, CAD:, hyperlipidemia                  Neuro/Psych:   Negative Neuro/Psych ROS              GI/Hepatic/Renal: Neg GI/Hepatic/Renal ROS            Endo/Other: Negative Endo/Other ROS                    Abdominal:             Vascular: negative vascular ROS. Other Findings:           Anesthesia Plan      general     ASA 1    (77-year-old male presents for ERCP DIAGNOSTIC. Plan general anesthesia with ASA standard monitors. Questions answered. Patient agreeable with anesthetic plan.  )  Induction: intravenous. Anesthetic plan and risks discussed with patient. Plan discussed with CRNA.     Attending anesthesiologist reviewed and agrees with Lokesh Boyce MD   2/9/2023

## 2023-02-09 NOTE — PROGRESS NOTES
Pt has become progressively confused and delirious throughout the night in spite of multiple interventions. Pt was demanding to leave and becoming verbally aggressive. Pt currently in 3 point restraints. Residents rounded on pt. Will continue to monitor.

## 2023-02-09 NOTE — PROGRESS NOTES
Pt continues to be confused. Zyprexa and melatonin have no affect. Pt pulled out IV, pulled off tele and continues to try and leave. Residents rounded on patient. Will administer 5 mg of Zyprexa. Patient monitor \"sitter\" is now at bedside.

## 2023-02-09 NOTE — H&P
Gastroenterology Note             Pre-operative History and Physical    Patient: Addis Duggan  : 1945  CSN:     History Obtained From:  patient and/or guardian. HISTORY OF PRESENT ILLNESS:    The patient is a 68 y.o. male  here for ERCP today  Dr. Dick Murphy has been following patient and has determined that the patient has an ascending cholangitis and is asked us to do a semiemergent ERCP to relieve the patient of his a sending cholangitis    Past Medical History:    Past Medical History:   Diagnosis Date    CAD (coronary artery disease)     Hyperlipidemia     Hypertension     Lower GI bleed     Myocardial infarction ([address]     Sensorineural hearing loss of right ear     Shingles      Past Surgical History:    Past Surgical History:   Procedure Laterality Date    CORONARY ANGIOPLASTY WITH STENT PLACEMENT      UPPER GASTROINTESTINAL ENDOSCOPY      received clipping for ania rice tear     Medications Prior to Admission:   No current facility-administered medications on file prior to encounter.      Current Outpatient Medications on File Prior to Encounter   Medication Sig Dispense Refill    metFORMIN (GLUCOPHAGE) 1000 MG tablet TAKE 1 TABLET BY MOUTH TWICE A DAY WITH MEALS 180 tablet 3    metoprolol tartrate (LOPRESSOR) 25 MG tablet TAKE 1/2 TABLET TWICE DAILY 90 tablet 3    atorvastatin (LIPITOR) 80 MG tablet TAKE 1 TABLET EVERY DAY 90 tablet 3    ramipril (ALTACE) 5 MG capsule TAKE 1 CAPSULE EVERY DAY 90 capsule 3    nitroGLYCERIN (NITROSTAT) 0.4 MG SL tablet Place 1 tablet under the tongue every 5 minutes as needed (PRN) 25 tablet 5    esomeprazole (NEXIUM) 20 MG delayed release capsule Take 20 mg by mouth every morning (before breakfast)      aspirin 81 MG EC tablet Take 81 mg by mouth daily Take 1/2 tablet twice daily          Allergies:  Codeine and Sulfa antibiotics      Social History:   Social History     Tobacco Use    Smoking status: Never    Smokeless tobacco: Former   Substance Use Topics    Alcohol use: No     Family History:   Family History   Problem Relation Age of Onset    Heart Disease Mother     Heart Failure Mother     Hypertension Mother     Diabetes Father     Heart Failure Father     Cancer Brother     Heart Disease Brother     Heart Disease Maternal Grandfather     Cancer Brother     Heart Disease Brother     Anemia Neg Hx     Arrhythmia Neg Hx     Asthma Neg Hx     Clotting Disorder Neg Hx     Fainting Neg Hx     Heart Attack Neg Hx     Heart Surgery Neg Hx     High Cholesterol Neg Hx     Pacemaker Neg Hx        PHYSICAL EXAM:      BP (!) 150/69   Pulse 76   Temp 98.6 °F (37 °C) (Oral)   Resp 18   Ht 6' 1\" (1.854 m)   Wt 180 lb 12.4 oz (82 kg)   SpO2 94%   BMI 23.85 kg/m²  I        Heart:   RRR, normal s1s2    Lungs:  CTA bilat,  Normal effort    Abdomen:   NT, ND      ASA Grade:  ASA 3 - Patient with moderate systemic disease with functional limitations    Mallampati Class: 2          ASSESSMENT AND PLAN:    1. Patient is a 68 y.o. male here for ERCP with general anesthesia. 2.  Procedure options, risks and benefits reviewed with patient. Patient expresses understanding.     Jas Marvin MD,   [facility]  2/9/2023

## 2023-02-09 NOTE — ANESTHESIA POSTPROCEDURE EVALUATION
Department of Anesthesiology  Postprocedure Note    Patient: May Sims  MRN: [id number]  YOB: 1945  Date of evaluation: 2/9/2023      Procedure Summary     Date: 02/09/23 Room / Location: [place] [address] / [facility]    Anesthesia Start: 1510 Anesthesia Stop: 8476    Procedures:       ERCP SPHINCTER/PAPILLOTOMY      ERCP STONE REMOVAL Diagnosis:       Elevated LFTs      (Elevated LFTs [R79.89])    Surgeons: Dayanna Rosas MD Responsible Provider: Darius Hernandez MD    Anesthesia Type: general ASA Status: 3          Anesthesia Type: No value filed.     Gale Phase I: Gale Score: 9    Gale Phase II:        Anesthesia Post Evaluation    Patient location during evaluation: PACU  Patient participation: complete - patient participated  Level of consciousness: awake and alert  Pain score: 0  Airway patency: patent  Nausea & Vomiting: no nausea and no vomiting  Complications: no  Cardiovascular status: hemodynamically stable  Respiratory status: acceptable  Hydration status: euvolemic

## 2023-02-09 NOTE — PROGRESS NOTES
Progress Note    Admit Date: 2/7/2023  Day: 2  Diet: Diet NPO    CC: Abdominal pain    Interval history: Patient was seen and examined this morning at bedside. Overnight patient became quite agitated and pulled out his IV as well as his telemetry. He was given Zyprexa twice without much relief so he had to be placed in three-point restraints. He seemed much more calm this morning however he was quite jittery and is constantly trying to pull at things. Plan is for GI to take him for ERCP today. He remains hemodynamically stable and afebrile. HPI: Jose Antonio Bsutos is a 68 y.o. male with a PMHx: HTN, CAD, DM, Prior MI who presented to the ED c/o of N/V and abdominal pain onset 4 hours prior to presentation. Per spouse at bedside he had 2 episodes of vomiting- non bilious, mainly stomach contents with no blood. After his second episode of emesis his abdominal pain improved. Patient described abdominal pain mainly in the epigastric region that initially resolved when he presented to the ED however it started to increase again. Last bowel movement noted to be same day of ED presentation. Due to patient having a prior MI wife reported that she was very concerned that he was having another 1 therefore she gave him a sublingual nitroglycerin tablet which did not improve his abdominal pain. In the ED patient reported that the pain felt much different from when he had his myocardial infarction. On presentation to the ED he was hemodynamically stable, CBC was unremarkable and BMP also unremarkable however CMP with elevated alkaline phosphatase, ALT/AST. Urinalysis was not revealing of any infection. CT abdomen overall revealed no acute abnormalities however there was some periportal edema seen. Per ED physician when he returned from his CT abdomen he was noted to be more confused and febrile at 100.6.   Per wife he does wax and wane with confusion over the past year and a half therefore she was not remarkably concerned. He was given a dose of Haldol 2.5 mg in the ED due to him being agitated and attempting to leave the emergency department. He was noted not to have decision-making capacity. Patient seen at bedside. Only alert to name however he Responded That He Does Not Know the Time of Year Alteplase. He is able to hold a conversation and answer questions concerning his symptoms. Wife reports that early on during the day he had a mild headache. He denies any chest pain, palpitations, diarrhea, dizziness or lightheadedness. He will be admitted for further evaluation of his elevated liver enzymes, confusion and new fever. All    Medications:     Scheduled Meds:   melatonin  3 mg Oral Nightly    [Held by provider] aspirin  81 mg Oral Daily    sodium chloride flush  5-40 mL IntraVENous 2 times per day    enoxaparin  40 mg SubCUTAneous Daily    piperacillin-tazobactam  3,375 mg IntraVENous q8h    pantoprazole  40 mg IntraVENous Daily     Continuous Infusions:   sodium chloride      sodium chloride 125 mL (02/08/23 2320)     PRN Meds:haloperidol lactate, sodium chloride flush, sodium chloride, ondansetron **OR** ondansetron, polyethylene glycol, acetaminophen **OR** acetaminophen    Objective:   Vitals:   T-max:  Patient Vitals for the past 8 hrs:   BP Temp Temp src Pulse Resp SpO2 Weight   02/09/23 0818 137/67 98.5 °F (36.9 °C) Oral 80 18 95 % --   02/09/23 0604 (!) 156/74 98.6 °F (37 °C) Axillary 90 18 96 % --   02/09/23 0429 122/66 98.6 °F (37 °C) Oral 77 18 95 % 180 lb 12.4 oz (82 kg)       Intake/Output Summary (Last 24 hours) at 2/9/2023 1020  Last data filed at 2/9/2023 7610  Gross per 24 hour   Intake 10 ml   Output 1125 ml   Net -1115 ml       Review of Systems   Unable to perform ROS: Mental status change     Physical Exam  Vitals reviewed. Constitutional:       General: He is awake. Appearance: Normal appearance. He is normal weight. Interventions: He is restrained.    HENT:      Head: Normocephalic and atraumatic. Eyes:      Extraocular Movements: Extraocular movements intact. Conjunctiva/sclera: Conjunctivae normal.      Pupils: Pupils are equal, round, and reactive to light. Cardiovascular:      Rate and Rhythm: Normal rate and regular rhythm. Heart sounds: Normal heart sounds. Pulmonary:      Effort: Pulmonary effort is normal.      Breath sounds: Normal breath sounds. Abdominal:      General: Abdomen is flat. Bowel sounds are normal.      Palpations: Abdomen is soft. Musculoskeletal:         General: Normal range of motion. Skin:     General: Skin is warm and dry. Neurological:      General: No focal deficit present. Mental Status: He is alert and oriented to person, place, and time. Mental status is at baseline. Psychiatric:         Mood and Affect: Mood normal.         Behavior: Behavior normal. Behavior is cooperative. LABS:    CBC:   Recent Labs     02/07/23 2359 02/08/23  0558 02/09/23  0825   WBC 9.6 14.5* 8.9   HGB 12.6* 12.4* 12.3*   HCT 37.0* 36.8* 37.5*    203 185   MCV 92.9 93.3 94.9     Renal:    Recent Labs     02/07/23 2359 02/08/23  0558 02/09/23  0800    136  --    K 4.0 4.1  --     96*  --    CO2 29 23  --    BUN 20 20  --    CREATININE 0.8 0.8  --    GLUCOSE 141* 156*  --    CALCIUM 9.4 9.5  --    MG  --  1.40*  --    PHOS  --   --  2.9   ANIONGAP 10 17*  --      Hepatic:   Recent Labs     02/07/23 2359 02/08/23  0558 02/08/23  1454 02/09/23  0830   * 440*  --   --    * 298*  --   --    BILITOT 0.9 0.9 1.0 0.9   BILIDIR 0.3  --  <0.2 <0.2   PROT 6.1* 6.5  --   --    LABALBU 4.0 4.3  --   --    ALKPHOS 213* 233*  --   --      Troponin:   Recent Labs     02/08/23  0322   TROPONINI <0.01     BNP: No results for input(s): BNP in the last 72 hours. Lipids: No results for input(s): CHOL, HDL in the last 72 hours.     Invalid input(s): LDLCALCU, TRIGLYCERIDE  ABGs:  No results for input(s): PHART, YOU9PUU, PO2ART, VGF0NCD, BEART, THGBART, E7RWOBMI, RVJ4ZDA in the last 72 hours. INR:   Recent Labs     02/08/23  0559   INR 1.04     Lactate: No results for input(s): LACTATE in the last 72 hours. Cultures:  -----------------------------------------------------------------  RAD:   MRI ABDOMEN W WO CONTRAST MRCP   Final Result      1. Mild distention of the gallbladder with significant wall thickening. Small amount of pericholecystic fluid is nonspecific. Possible tiny gallstone at the neck is favored to be artifactual. Otherwise no gallstones demonstrated. 2.  Dilation of the common bile duct measuring up to 12 mm with mild diffuse intrahepatic duct dilation. This is not specific. No focal stricture, choledocholithiasis, or obstructing mass evident. 3.  Multiple duodenal diverticula including duodenal diverticula at the ampulla. No significant compression on the adjacent distal common bile duct and pancreatic ducts demonstrated. 4.  Benign cyst in the interpolar right kidney measuring 4.6 cm.   5.  Nonspecific tiny cystic lesions in the pancreas. Largest measures 6 mm at the inferior pancreatic body. These could reflect side branch IPMN. Based on current guidelines, recommend follow-up abdominal MRI in 2 years to evaluate stability. US ABDOMEN LIMITED   Final Result   1. Nonspecific gallbladder wall thickening without shadowing calculus or pain over the gallbladder. Cholecystitis is considered unlikely but cannot be entirely excluded. 2. Dilated common bile duct without definite obstructing abnormality. Consider further evaluation with MRCP or ERCP. 3. Equivocal edema along the pancreas which appeared normal on CT of the abdomen from earlier today. XR CHEST PORTABLE   Final Result   1. Mild left lower lobe atelectasis or infiltrate. CT ABDOMEN PELVIS W IV CONTRAST Additional Contrast? Oral   Final Result      No acute abdominopelvic abnormality. INCIDENTAL FINDINGS: None. Assessment/Plan:   Abdominal pain   Elevated liver enzymes  CT abdomen and pelvis only revealing mild periportal edema  Patient with no leukocytosis and jaundice however during ED stay he became altered with an episode of hypotension and febrile. Moderate elevation in AST and ALT 5x ULN. He likely had a gallstone which already passed indicated by elevated alk phos but normal bilirubin. Received Zosyn in the ED  -Continue Zosyn  -Procalcitonin elevated at 5.9, CRP normal at 3.9, and total CK was 252  -Hepatitis panel pending  -Blood cultures x2 grew E. coli. Continue on Zosyn  -Daily CMP  -RUQ done on 2/8 showed nonspecific gallbladder wall thickening and dilated common bile duct without definite obstructing abnormality. -MRCP showed mild distention of the gallbladder with significant wall thickening. Possible tiny gallstone at the neck is favored to be artifactual.  Dilation of the common bile duct measuring up to 12 mm with diffuse Intermatic duct dilation. -GGT slightly elevated at 116  -Lipase level normal at 15  -Bilirubin levels normal  -General surgery following  -GI following and plans to take him for ERCP today     Sepsis, most probably, secondary to acute cholecystitis versus ascending cholangitis  Patient presenting with elevated temperature and tachycardia but no clear source of infection.   Received 1 L bolus in the ED and started on IV fluid  -Leukocytosis has resolved  -Trend WBC with daily CBC     Lactic acidosis  Patient received a 1 L LR bolus in the ED.  -LA 2.3->4.4->2.2  -Trend lactic acid every 6 hours  -Continue on IV fluids     Hx of Hypertension  Pt now hypotensive  -Holding home antiHTN       Code Status: Full code  FEN: N.p.o.  PPX: Lovenox  DISPO: RACHELLE Daniel, DO PGY-1  02/09/23  10:20 AM    This patient has been staffed and discussed with Eric Anthony MD.     Addendum to Resident H& P/Progress note:  I have personally seen,examined and evaluated the patient.  I have reviewed the current history, physical findings, labs and assessment and plan and agree with note as documented by resident DO ( Starr Torres)  Discussed the patient's condition with his nurse    Rosalia Goel MD, Alina Valiente

## 2023-02-09 NOTE — PROCEDURES
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 EBL: <50 mL    Indications: This is a 68y.o. year old male who was admitted to the hospital with increasing liver enzymes abdominal pain midepigastric nausea and vomiting he had an MRI which showed possible cholangitis he also has a E. coli sepsis therefore we are asked to see and evaluate him for ERCP    Procedure: Informed consent was obtained from the patient after explanation of indications, benefits, possible risks and complications of the procedure. The patient was then taken to the endoscopy suite. A time-out was performed. The patient and staff were in agreement as to the correct patient and procedure. The above anesthesia was administered by the anesthesia department. The patient was placed in the left lateral prone position. Vital signs and heart rhythm were monitored prior to and throughout the entire procedure. The Olympus [id number] Video therapeutic duodenoscope was placed in the patient's mouth and blindly advanced into the esophagus. The scope was then advanced through the esophagus, stomach and into the second portion of the duodenum where the major papilla was visualized. The major papilla was was in between duodenal diverticula.     Cholangiogram:  With 1 cannulation using a guidewire up and then the duct injection of dye was very difficult to see the common bile duct on injection because he had dye in his transverse colon from previous CT scan and therefore the duct itself was dilated most likely around 10 mm but we also thought there might be a filling defect but this could have an overlap from the other transverse colon we made a nice biliary sphincterotomy he was draining barillas bile we then used a biliary basket and which was an 8 wire type basket was up multiple times no stones or or purulent material came out he was draining bile nicely we had an adequate sphincterotomy and we terminated the procedure at this time the       Pancreatogram: Not cannulated       the cannulas were then withdrawn. The duodenum and stomach were decompressed and the scope was withdrawn from the patient. The patient tolerated the procedure well and was taken to the post anesthesia care unit in good condition. Radiological Interpretation:  All fluoroscopic images and are still x-ray films were carefullly examined and interpreted by the endoscopist on the spot during the procedure in the absence of radiologist.  These interpretations guided the course of the procedure and the interventions mentioned above and below.         Impression: Dilated common bile duct with no evidence of stones or purulent material today          Recommendations: N.p.o. for the next 4 hours  Hydrate patient well and postop  Control any pain and nausea  Given his gallbladder status and his E. coli sepsis continue him on antibiotics  Most likely the patient will need a cholecystectomy in the very near future thank you        Unice Felty, MD  [address]  2/9/2023

## 2023-02-09 NOTE — PLAN OF CARE
Problem: Pain  Goal: Verbalizes/displays adequate comfort level or baseline comfort level  Outcome: Progressing  Flowsheets  Taken 2/9/2023 1111  Verbalizes/displays adequate comfort level or baseline comfort level:   Encourage patient to monitor pain and request assistance   Administer analgesics based on type and severity of pain and evaluate response   Consider cultural and social influences on pain and pain management   Assess pain using appropriate pain scale   Implement non-pharmacological measures as appropriate and evaluate response   Notify Licensed Independent Practitioner if interventions unsuccessful or patient reports new pain  Taken 2/9/2023 0818  Verbalizes/displays adequate comfort level or baseline comfort level:   Encourage patient to monitor pain and request assistance   Assess pain using appropriate pain scale  Note: Discussed pharmacological and non-pharmacological interventions to assist with pain      Problem: Confusion  Goal: Confusion, delirium, dementia, or psychosis is improved or at baseline  Description: INTERVENTIONS:  1. Assess for possible contributors to thought disturbance, including medications, impaired vision or hearing, underlying metabolic abnormalities, dehydration, psychiatric diagnoses, and notify attending LIP  2. [place] high risk fall precautions, as indicated  3. Provide frequent short contacts to provide reality reorientation, refocusing and direction  4. Decrease environmental stimuli, including noise as appropriate  5. Monitor and intervene to maintain adequate nutrition, hydration, elimination, sleep and activity  6. If unable to ensure safety without constant attention obtain sitter and review sitter guidelines with assigned personnel  7.  Initiate Psychosocial CNS and Spiritual Care consult, as indicated  Outcome: Progressing  Flowsheets (Taken 2/9/2023 0821)  Effect of thought disturbance (confusion, delirium, dementia, or psychosis) are managed with adequate functional status:   Assess for contributors to thought disturbance, including medications, impaired vision or hearing, underlying metabolic abnormalities, dehydration, psychiatric diagnoses, notify [place] high risk fall precautions, as indicated   Provide frequent short contacts to provide reality reorientation, refocusing and direction   Decrease environmental stimuli, including noise as appropriate   Monitor and intervene to maintain adequate nutrition, hydration, elimination, sleep and activity  Note: Continuing re-orienting patient, falls precautions in place     Problem: Safety - Adult  Goal: Free from fall injury  Outcome: Progressing  Flowsheets  Taken 2/9/2023 1111  Free From Fall Injury: Instruct family/caregiver on patient safety  Taken 2/9/2023 0824  Free From Fall Injury: Instruct family/caregiver on patient safety  Note: Discussed falls precautions and interventions used to promote patient safety

## 2023-02-09 NOTE — PROGRESS NOTES
Pt getting out of bed trying to look for his wallet. Very concerned he lost it. Helped pt call his wife so that she could reassure him that she has it and to help reorient him.

## 2023-02-09 NOTE — PROGRESS NOTES
From Endo arousable but very drowsy and restless, elevatd BP and ST. Per CRNA, both lower front teeth are very loose. Report from CRNA and Endo RN.     S/P ERCP SPHINCTER/PAPILLOTOMY

## 2023-02-09 NOTE — PROGRESS NOTES
Patient has become increasingly confused overnight. He is disoriented to place and situation. He continues to try to get up to leave to go home, find his wallet and pull off the tele box. Zyprexa 2.5 mg prn was just administered to help the patient relax and sleep. He is otherwise pleasant, but requires constant redirection.

## 2023-02-09 NOTE — PROGRESS NOTES
General Surgery   Daily Progress Note  Patient: Maryann Zarate      CC: Dilated common bile duct     SUBJECTIVE:   Afebrile, HDS. Received Haldol overnight for combativeness. Very confused this morning. A&Ox1. Denies abdominal pain, nausea, or vomiting. ROS:   A 14 point review of systems was conducted, significant findings as noted above. All other systems negative. OBJECTIVE:    PHYSICAL EXAM:    Vitals:    02/08/23 1948 02/08/23 2313 02/09/23 0429 02/09/23 0604   BP: 125/65 113/65 122/66 (!) 156/74   Pulse: 90 79 77 90   Resp: 18 18 18 18   Temp: 98.8 °F (37.1 °C) 98.5 °F (36.9 °C) 98.6 °F (37 °C) 98.6 °F (37 °C)   TempSrc: Oral Oral Oral Axillary   SpO2: 93% 96% 95% 96%   Weight:   180 lb 12.4 oz (82 kg)    Height:           General appearance: alert, no acute distress, grooming appropriate  Eyes: No scleral icterus, EOM grossly intact  Neck: trachea midline, neck supple  Chest/Lungs: normal effort with no accessory muscle use on RA  Cardiovascular: RRR, well perfused  Abdomen: Soft, non-tender, non-distended, no rebound, guarding, or rigidity.  Negative Pena's sign   Skin: warm and dry, no rashes  Extremities: no edema, no cyanosis  Genitourinary: Grossly normal  Neuro: A&Ox1, no focal deficits, sensation intact    LABS:   Recent Labs     02/07/23 2359 02/08/23  0558   WBC 9.6 14.5*   HGB 12.6* 12.4*   HCT 37.0* 36.8*   MCV 92.9 93.3    203        Recent Labs     02/07/23 2359 02/08/23  0558    136   K 4.0 4.1    96*   CO2 29 23   BUN 20 20   CREATININE 0.8 0.8        Recent Labs     02/07/23 2359 02/08/23  0558 02/08/23  1454   * 440*  --    * 298*  --    BILIDIR 0.3  --  <0.2   BILITOT 0.9 0.9 1.0   ALKPHOS 213* 233*  --         Recent Labs     02/07/23 2359 02/08/23  0914   LIPASE 33.0 15.0        Recent Labs     02/07/23  2359 02/08/23  0558 02/08/23  0559   PROT 6.1* 6.5  --    INR  --   --  1.04        Recent Labs     02/08/23  0322 02/08/23  0559   CKTOTAL  -- 80   TROPONINI <0.01  --          ASSESSMENT & PLAN:   This is a 68 y.o. male with Hx of epigastric abdominal pain, nausea, and emesis in the setting of elevated LFT's without evidence of clear cholecystitis or stones on RUQ US.     - Symptoms improved  - Waiting on labs  - Follow up MRCP   - Will follow along     Mally Gregory MD  PGY1, General Surgery  02/09/23  6:55 AM  Pager#[phone]     I have seen, examined, and reviewed the patients chart. I agree with the residents assessment and have made appropriate changes.     Loki León

## 2023-02-09 NOTE — PROGRESS NOTES
[address] [place] [address]  GI Progress Note          Stoney Souza is a 68 y.o. male patient. 1. LFT elevation    2. Abdominal pain, epigastric    3. Nausea and vomiting, unspecified vomiting type        Admit Date: 2023    Subjective:       Confused overnight, denies abd pain N/V    Scheduled Meds:   melatonin  3 mg Oral Nightly    [Held by provider] aspirin  81 mg Oral Daily    sodium chloride flush  5-40 mL IntraVENous 2 times per day    enoxaparin  40 mg SubCUTAneous Daily    piperacillin-tazobactam  3,375 mg IntraVENous q8h    pantoprazole  40 mg IntraVENous Daily       Objective:       Patient Vitals for the past 24 hrs:   BP Temp Temp src Pulse Resp SpO2 Weight   23 0604 (!) 156/74 98.6 °F (37 °C) Axillary 90 18 96 % --   23 0429 122/66 98.6 °F (37 °C) Oral 77 18 95 % 180 lb 12.4 oz (82 kg)   23 2313 113/65 98.5 °F (36.9 °C) Oral 79 18 96 % --   23 1948 125/65 98.8 °F (37.1 °C) Oral 90 18 93 % --   23 1724 (!) 119/54 99.2 °F (37.3 °C) Oral 91 18 93 % --   23 1134 126/69 98.2 °F (36.8 °C) Oral 91 18 96 % --   23 0845 132/69 98.3 °F (36.8 °C) Oral 98 18 97 % --       Exam:  VITALS:  BP (!) 156/74   Pulse 90   Temp 98.6 °F (37 °C) (Axillary)   Resp 18   Ht 6' 1\" (1.854 m)   Wt 180 lb 12.4 oz (82 kg)   SpO2 96%   BMI 23.85 kg/m²   TEMPERATURE:  Current - Temp: 98.6 °F (37 °C); Max - Temp  Av.6 °F (37 °C)  Min: 98.2 °F (36.8 °C)  Max: 99.2 °F (37.3 °C)    NAD  General appearance: alert, appears stated age, cooperative and no distress  Abdomen: soft, non-tender; bowel sounds normal; no masses,  no organomegaly  AAOx3, No asterixis or encephalopathy  Extremities: No edema.     Recent Labs     23  2359 23  0558   WBC 9.6 14.5*   HGB 12.6* 12.4*   HCT 37.0* 36.8*   MCV 92.9 93.3    203     Recent Labs     23  2359 23  0558    136   K 4.0 4.1    96*   CO2 29 23   BUN 20 20   CREATININE 0.8 0.8     Recent Labs 02/07/23 2359 02/08/23  0558 02/08/23  1454   * 440*  --    * 298*  --    BILIDIR 0.3  --  <0.2   BILITOT 0.9 0.9 1.0   ALKPHOS 213* 233*  --      Recent Labs     02/07/23 2359 02/08/23  0914   LIPASE 33.0 15.0     Recent Labs     02/07/23 2359 02/08/23  0558 02/08/23  0559   PROT 6.1* 6.5  --    INR  --   --  1.04     MRI pending    Assessment:       Principal Problem:    Elevated liver enzymes  Active Problems:    Sepsis without acute organ dysfunction (HCC)    Abdominal pain, epigastric    LFT elevation    Lactic acidosis    Nausea and vomiting  Resolved Problems:    * No resolved hospital problems. *    Consider cholangitis with sepsis E coli dilated CBD, awaiting MRCP, keep NPO for possible ERCP      Blanche Hernandez MD  2/9/2023  7:43 AM    Addendum: reviewed MRCP possible small gallstone in GB CBD mildly dilated, no mass or CBD lesion, periampullary diverticulum.   Discussed ERCP anesthesia with patients wife as patient unable to consent, risks potential complications alternatives reviewed she consents to proceed with ERCP today

## 2023-02-09 NOTE — PLAN OF CARE
Problem: Cardiovascular - Adult  Goal: Maintains optimal cardiac output and hemodynamic stability  Outcome: Progressing  Flowsheets (Taken 2/9/2023 9723)  Maintains optimal cardiac output and hemodynamic stability:   Monitor blood pressure and heart rate   Monitor urine output and notify Licensed Independent Practitioner for values outside of normal range   Assess for signs of decreased cardiac output   Administer fluid and/or volume expanders as ordered     Problem: Gastrointestinal - Adult  Goal: Minimal or absence of nausea and vomiting  Outcome: Progressing  Flowsheets (Taken 2/9/2023 0635)  Minimal or absence of nausea and vomiting:   Administer IV fluids as ordered to ensure adequate hydration   Maintain NPO status until nausea and vomiting are resolved   Administer ordered antiemetic medications as needed   Provide nonpharmacologic comfort measures as appropriate     Problem: Genitourinary - Adult  Goal: Absence of urinary retention  Outcome: Progressing  Flowsheets (Taken 2/9/2023 0635)  Absence of urinary retention:   Assess patients ability to void and empty bladder   Monitor intake/output and perform bladder scan as needed     Problem: Safety - Medical Restraint  Goal: Remains free of injury from restraints (Restraint for Interference with Medical Device)  Description: INTERVENTIONS:  1. Determine that other, less restrictive measures have been tried or would not be effective before applying the restraint  2. Evaluate the patient's condition at the time of restraint application  3. Inform patient/family regarding the reason for restraint  4.  Q2H: Monitor safety, psychosocial status, comfort, nutrition and hydration  Outcome: Progressing  Flowsheets (Taken 2/9/2023 2764)  Remains free of injury from restraints (restraint for interference with medical device):   Determine that other, less restrictive measures have been tried or would not be effective before applying the restraint   Evaluate the patient's condition at the time of restraint application   Inform patient/family regarding the reason for restraint   Every 2 hours: Monitor safety, psychosocial status, comfort, nutrition and hydration     Problem: Pain  Goal: Verbalizes/displays adequate comfort level or baseline comfort level  Recent Flowsheet Documentation  Taken 2/8/2023 1724 by Prakash Pickens RN  Verbalizes/displays adequate comfort level or baseline comfort level: Encourage patient to monitor pain and request assistance

## 2023-02-09 NOTE — PROGRESS NOTES
PRE-OP NOTE  Department of Surgery  Resident Note     Procedure: lap lia with IOC    Consent: will obtain on hard chart    Labs      Recent Labs     23  0558 23  0825   WBC 14.5* 8.9   HGB 12.4* 12.3*   HCT 36.8* 37.5*   MCV 93.3 94.9    185        Recent Labs     23  0558 23  0800 23  0830     --  138   K 4.1  --  4.3   CL 96*  --  102   CO2 23  --  23   PHOS  --  2.9  --    BUN 20  --  18   CREATININE 0.8  --  0.8        Recent Labs     23  0558 23  1454 23  0830   *  --  110*   *  --  156*   BILIDIR  --  <0.2 <0.2   BILITOT 0.9 1.0 0.9  0.9   ALKPHOS 233*  --  173*        Recent Labs     23  2359 23  0914   LIPASE 33.0 15.0        Recent Labs     23  0558 23  0559 23  0830   PROT 6.5  --  5.6*   INR  --  1.04  --         Recent Labs     23  0322 23  0559   CKTOTAL  --  80   TROPONINI <0.01  --        CXR: 23  [id number]      Orders:   1. Diet: NPO at MNT,  IVF at 100  2. Pre op Medications:  (abxs)  Currently taking zosyn  3. Labs to be drawn: Type and Screen, PT/INR  4.  Anesthesia to see patient         Maulik Alcantara MD,  2023,  4:34 PM

## 2023-02-09 NOTE — ANESTHESIA PRE PROCEDURE
Department of Anesthesiology  Preprocedure Note       Name:  Anton Pierre   Age:  68 y.o.  :  1945                                          MRN:  [id number]         Date:  2023      Surgeon: Mike Perez):  April Cummings MD    Procedure: Procedure(s):  ERCP DIAGNOSTIC    Medications prior to admission:   Prior to Admission medications    Medication Sig Start Date End Date Taking?  Authorizing Provider   metFORMIN (GLUCOPHAGE) 1000 MG tablet TAKE 1 TABLET BY MOUTH TWICE A DAY WITH MEALS 22   Lanora Kanner, MD   metoprolol tartrate (LOPRESSOR) 25 MG tablet TAKE 1/2 TABLET TWICE DAILY 22   NORMAN Farr CNP   atorvastatin (LIPITOR) 80 MG tablet TAKE 1 TABLET EVERY DAY 22   NORMAN Farr CNP   ramipril (ALTACE) 5 MG capsule TAKE 1 CAPSULE EVERY DAY 22   NORMAN Farr CNP   nitroGLYCERIN (NITROSTAT) 0.4 MG SL tablet Place 1 tablet under the tongue every 5 minutes as needed (PRN) 21   Jimi Turner MD   esomeprazole (NEXIUM) 20 MG delayed release capsule Take 20 mg by mouth every morning (before breakfast)    Historical Provider, MD   aspirin 81 MG EC tablet Take 81 mg by mouth daily Take 1/2 tablet twice daily    Historical Provider, MD       Current medications:    Current Facility-Administered Medications   Medication Dose Route Frequency Provider Last Rate Last Admin    melatonin tablet 3 mg  3 mg Oral Nightly Eva Whittaker MD        haloperidol lactate (HALDOL) injection 2 mg  2 mg IntraMUSCular Once PRN Eva MD Remedios        tamsulosin [facility]) capsule 0.4 mg  0.4 mg Oral Daily Geovanny Russ DO   0.4 mg at 23 1223    [Held by provider] aspirin EC tablet 81 mg  81 mg Oral Daily Tj Pop MD   81 mg at 23 0135    sodium chloride flush 0.9 % injection 5-40 mL  5-40 mL IntraVENous 2 times per day Eva Whittaker MD   10 mL at 23 0823    sodium chloride flush 0.9 % injection 5-40 mL  5-40 mL IntraVENous PRN Haydee Mcnamara MD       • 0.9 % sodium chloride infusion   IntraVENous PRN Haydee Mcnamara MD       • enoxaparin (LOVENOX) injection 40 mg  40 mg SubCUTAneous Daily Haydee Mcnamara MD   40 mg at 02/08/23 0903   • ondansetron (ZOFRAN-ODT) disintegrating tablet 4 mg  4 mg Oral Q8H PRN Haydee Mcnamara MD        Or   • ondansetron (ZOFRAN) injection 4 mg  4 mg IntraVENous Q6H PRN Haydee Mcnamara MD       • polyethylene glycol (GLYCOLAX) packet 17 g  17 g Oral Daily PRN Haydee Mcnamara MD       • acetaminophen (TYLENOL) tablet 650 mg  650 mg Oral Q6H PRN Haydee Mcnamara MD        Or   • acetaminophen (TYLENOL) suppository 650 mg  650 mg Rectal Q6H PRN Haydee Mcnamara MD       • piperacillin-tazobactam (ZOSYN) 3,375 mg in sodium chloride 0.9 % 50 mL IVPB (mini-bag)  3,375 mg IntraVENous q8h Haydee Mcnamara MD 12.5 mL/hr at 02/09/23 1059 3,375 mg at 02/09/23 1059   • 0.9 % sodium chloride infusion   IntraVENous Continuous Cyrus Magana  mL/hr at 02/09/23 1208 New Bag at 02/09/23 1208   • pantoprazole (PROTONIX) injection 40 mg  40 mg IntraVENous Daily Rosemary Mendoza MD   40 mg at 02/09/23 0826       Allergies:    Allergies   Allergen Reactions   • Codeine    • Sulfa Antibiotics Nausea And Vomiting       Problem List:    Patient Active Problem List   Diagnosis Code   • HLD (hyperlipidemia) E78.5   • Essential hypertension, benign I10   • Old myocardial infarction I25.2   • Coronary atherosclerosis of native coronary artery I25.10   • Respiratory failure (HCC) J96.90   • Hemorrhagic shock (HCC) R57.8   • Alicja-Cancino tear K22.6   • Pre-syncope R55   • GI bleed K92.2   • History of lower GI bleeding Z87.19   • Encounter to establish care Z76.89   • Tinea corporis B35.4   • Skin lesion L98.9   • Hyperglycemia R73.9   • Right upper quadrant abdominal pain R10.11   • Elevated liver enzymes R74.8   • Sepsis without acute organ dysfunction (HCC) A41.9   • Abdominal pain, epigastric R10.13   • LFT  elevation R79.89    Lactic acidosis E87.20    Nausea and vomiting R11.2    Ascending cholangitis K83.09       Past Medical History:        Diagnosis Date    CAD (coronary artery disease)     Hyperlipidemia     Hypertension     Lower GI bleed     Myocardial infarction (HCC)     Sensorineural hearing loss of right ear     Shingles        Past Surgical History:        Procedure Laterality Date    CORONARY ANGIOPLASTY WITH STENT PLACEMENT      UPPER GASTROINTESTINAL ENDOSCOPY      received clipping for ania rice tear       Social History:    Social History     Tobacco Use    Smoking status: Never    Smokeless tobacco: Former   Substance Use Topics    Alcohol use: No                                Counseling given: Not Answered      Vital Signs (Current):   Vitals:    02/09/23 0604 02/09/23 0818 02/09/23 0821 02/09/23 1202   BP: (!) 156/74 137/67  (!) 150/69   Pulse: 90 80 80 76   Resp: 18 18  18   Temp: 98.6 °F (37 °C) 98.5 °F (36.9 °C)  98.6 °F (37 °C)   TempSrc: Axillary Oral  Oral   SpO2: 96% 95%  94%   Weight:       Height:                                                  BP Readings from Last 3 Encounters:   02/09/23 (!) 150/69   11/02/22 (!) 174/92   05/04/22 132/74       NPO Status:                                                                                 BMI:   Wt Readings from Last 3 Encounters:   02/09/23 180 lb 12.4 oz (82 kg)   02/08/23 195 lb (88.5 kg)   11/02/22 192 lb 12.8 oz (87.5 kg)     Body mass index is 23.85 kg/m².     CBC:   Lab Results   Component Value Date/Time    WBC 8.9 02/09/2023 08:25 AM    RBC 3.95 02/09/2023 08:25 AM    HGB 12.3 02/09/2023 08:25 AM    HCT 37.5 02/09/2023 08:25 AM    MCV 94.9 02/09/2023 08:25 AM    RDW 14.0 02/09/2023 08:25 AM     02/09/2023 08:25 AM       CMP:   Lab Results   Component Value Date/Time     02/09/2023 08:30 AM    K 4.3 02/09/2023 08:30 AM     02/09/2023 08:30 AM    CO2 23 02/09/2023 08:30 AM    BUN 18 02/09/2023 08:30 AM    CREATININE 0.8 02/09/2023 08:30 AM    GFRAA >60 01/22/2021 10:24 AM    AGRATIO 1.3 02/09/2023 08:30 AM    LABGLOM >60 02/09/2023 08:30 AM    GLUCOSE 126 02/09/2023 08:30 AM    PROT 5.6 02/09/2023 08:30 AM    CALCIUM 8.6 02/09/2023 08:30 AM    BILITOT 0.9 02/09/2023 08:30 AM    BILITOT 0.9 02/09/2023 08:30 AM    ALKPHOS 173 02/09/2023 08:30 AM     02/09/2023 08:30 AM     02/09/2023 08:30 AM       POC Tests:   Recent Labs     02/09/23  0038   POCGLU 120*       Coags:   Lab Results   Component Value Date/Time    PROTIME 13.5 02/08/2023 05:59 AM    INR 1.04 02/08/2023 05:59 AM    APTT 20.9 06/12/2015 03:56 AM       HCG (If Applicable): No results found for: PREGTESTUR, PREGSERUM, HCG, HCGQUANT     ABGs:   Lab Results   Component Value Date/Time    PHART 7.28 06/12/2015 07:08 AM    PO2ART 365 06/12/2015 07:08 AM    LSX3INE 44 06/12/2015 07:08 AM    LAE6EQG 20 06/12/2015 07:08 AM    BEART -6.0 06/12/2015 07:08 AM    V8TFGEBR 100 06/12/2015 07:08 AM        Type & Screen (If Applicable):  No results found for: LABABO, LABRH    Drug/Infectious Status (If Applicable):  No results found for: HIV, HEPCAB    COVID-19 Screening (If Applicable):   Lab Results   Component Value Date/Time    COVID19 NOT DETECTED 02/08/2023 06:00 AM    COVID19 NOT DETECTED 12/15/2020 08:48 PM    COVID19 DETECTED 11/24/2020 09:06 AM           Anesthesia Evaluation  Patient summary reviewed and Nursing notes reviewed no history of anesthetic complications:   Airway: Mallampati: II  TM distance: >3 FB   Neck ROM: full  Mouth opening: > = 3 FB   Dental:          Pulmonary:Negative Pulmonary ROS                              Cardiovascular:    (+) hypertension:, past MI:, CAD:, hyperlipidemia                  Neuro/Psych:   Negative Neuro/Psych ROS              GI/Hepatic/Renal: Neg GI/Hepatic/Renal ROS            Endo/Other: Negative Endo/Other ROS                    Abdominal:             Vascular: negative vascular ROS. Other Findings:           Anesthesia Plan      general     ASA 1    (66-year-old male presents for ERCP DIAGNOSTIC. Plan general anesthesia with ASA standard monitors. Questions answered. Patient agreeable with anesthetic plan.  )  Induction: intravenous. Anesthetic plan and risks discussed with patient. Plan discussed with CRNA.     Attending anesthesiologist reviewed and agrees with Neel Rosenberg MD   2/9/2023

## 2023-02-09 NOTE — PROGRESS NOTES
Called patient's wife Dorys Reyes to provide her with an update. Informed her that he is currently in restraints & MRI results pending. Wife will be in early to meet with doctors.

## 2023-02-09 NOTE — PROGRESS NOTES
PACU Transfer Note    Vitals:    02/09/23 1545   BP: 139/75   Pulse: 96   Resp: 16   Temp: 98.5 °F (36.9 °C)   SpO2: 99%       In: 135 [I.V.:135]  Out: 550 [Urine:550]    Pain assessment:    Pain Level: 0    Report given to Receiving unit WILDER Weaver.     2/9/2023 3:57 PM

## 2023-02-10 ENCOUNTER — ANESTHESIA (OUTPATIENT)
Dept: OPERATING ROOM | Age: 78
End: 2023-02-10
Payer: MEDICARE

## 2023-02-10 ENCOUNTER — ANESTHESIA EVENT (OUTPATIENT)
Dept: OPERATING ROOM | Age: 78
End: 2023-02-10
Payer: MEDICARE

## 2023-02-10 ENCOUNTER — APPOINTMENT (OUTPATIENT)
Dept: NUCLEAR MEDICINE | Age: 78
DRG: 872 | End: 2023-02-10
Payer: MEDICARE

## 2023-02-10 PROBLEM — B96.20 E COLI BACTEREMIA: Status: ACTIVE | Noted: 2023-02-10

## 2023-02-10 PROBLEM — R78.81 E COLI BACTEREMIA: Status: ACTIVE | Noted: 2023-02-10

## 2023-02-10 PROBLEM — K81.0 ACUTE CHOLECYSTITIS: Status: ACTIVE | Noted: 2023-02-10

## 2023-02-10 LAB
ABO/RH: NORMAL
ALBUMIN SERPL-MCNC: 3.3 G/DL (ref 3.4–5)
ALP BLD-CCNC: 143 U/L (ref 40–129)
ALT SERPL-CCNC: 110 U/L (ref 10–40)
ANION GAP SERPL CALCULATED.3IONS-SCNC: 11 MMOL/L (ref 3–16)
ANTIBODY SCREEN: NORMAL
AST SERPL-CCNC: 68 U/L (ref 15–37)
BASOPHILS ABSOLUTE: 0 K/UL (ref 0–0.2)
BASOPHILS RELATIVE PERCENT: 0.5 %
BILIRUB SERPL-MCNC: 0.5 MG/DL (ref 0–1)
BILIRUBIN DIRECT: <0.2 MG/DL (ref 0–0.3)
BILIRUBIN, INDIRECT: ABNORMAL MG/DL (ref 0–1)
BUN BLDV-MCNC: 15 MG/DL (ref 7–20)
CALCIUM SERPL-MCNC: 8.2 MG/DL (ref 8.3–10.6)
CHLORIDE BLD-SCNC: 108 MMOL/L (ref 99–110)
CO2: 23 MMOL/L (ref 21–32)
CREAT SERPL-MCNC: 0.9 MG/DL (ref 0.8–1.3)
EOSINOPHILS ABSOLUTE: 0 K/UL (ref 0–0.6)
EOSINOPHILS RELATIVE PERCENT: 0 %
FERRITIN: 181.1 NG/ML (ref 30–400)
GFR SERPL CREATININE-BSD FRML MDRD: >60 ML/MIN/{1.73_M2}
GLUCOSE BLD-MCNC: 113 MG/DL (ref 70–99)
GLUCOSE BLD-MCNC: 86 MG/DL (ref 70–99)
GLUCOSE BLD-MCNC: 98 MG/DL (ref 70–99)
HCT VFR BLD CALC: 35.6 % (ref 40.5–52.5)
HEMOGLOBIN: 11.8 G/DL (ref 13.5–17.5)
INR BLD: 1.12 (ref 0.87–1.14)
IRON SATURATION: 12 % (ref 20–50)
IRON: 27 UG/DL (ref 59–158)
LYMPHOCYTES ABSOLUTE: 0.6 K/UL (ref 1–5.1)
LYMPHOCYTES RELATIVE PERCENT: 8.6 %
MAGNESIUM: 1.9 MG/DL (ref 1.8–2.4)
MCH RBC QN AUTO: 31.6 PG (ref 26–34)
MCHC RBC AUTO-ENTMCNC: 33.1 G/DL (ref 31–36)
MCV RBC AUTO: 95.3 FL (ref 80–100)
MONOCYTES ABSOLUTE: 0.5 K/UL (ref 0–1.3)
MONOCYTES RELATIVE PERCENT: 6.8 %
NEUTROPHILS ABSOLUTE: 5.8 K/UL (ref 1.7–7.7)
NEUTROPHILS RELATIVE PERCENT: 84.1 %
PDW BLD-RTO: 13.9 % (ref 12.4–15.4)
PERFORMED ON: NORMAL
PERFORMED ON: NORMAL
PHOSPHORUS: 4 MG/DL (ref 2.5–4.9)
PLATELET # BLD: 169 K/UL (ref 135–450)
PMV BLD AUTO: 8.4 FL (ref 5–10.5)
POTASSIUM SERPL-SCNC: 4.4 MMOL/L (ref 3.5–5.1)
PROTHROMBIN TIME: 14.3 SEC (ref 11.7–14.5)
RBC # BLD: 3.73 M/UL (ref 4.2–5.9)
SODIUM BLD-SCNC: 142 MMOL/L (ref 136–145)
TOTAL IRON BINDING CAPACITY: 222 UG/DL (ref 260–445)
TOTAL PROTEIN: 5 G/DL (ref 6.4–8.2)
WBC # BLD: 6.9 K/UL (ref 4–11)

## 2023-02-10 PROCEDURE — 6360000002 HC RX W HCPCS: Performed by: STUDENT IN AN ORGANIZED HEALTH CARE EDUCATION/TRAINING PROGRAM

## 2023-02-10 PROCEDURE — 2580000003 HC RX 258

## 2023-02-10 PROCEDURE — C9113 INJ PANTOPRAZOLE SODIUM, VIA: HCPCS | Performed by: STUDENT IN AN ORGANIZED HEALTH CARE EDUCATION/TRAINING PROGRAM

## 2023-02-10 PROCEDURE — 1200000000 HC SEMI PRIVATE

## 2023-02-10 PROCEDURE — 78226 HEPATOBILIARY SYSTEM IMAGING: CPT

## 2023-02-10 PROCEDURE — 86850 RBC ANTIBODY SCREEN: CPT

## 2023-02-10 PROCEDURE — 2580000003 HC RX 258: Performed by: STUDENT IN AN ORGANIZED HEALTH CARE EDUCATION/TRAINING PROGRAM

## 2023-02-10 PROCEDURE — 83550 IRON BINDING TEST: CPT

## 2023-02-10 PROCEDURE — 83540 ASSAY OF IRON: CPT

## 2023-02-10 PROCEDURE — 36415 COLL VENOUS BLD VENIPUNCTURE: CPT

## 2023-02-10 PROCEDURE — 99232 SBSQ HOSP IP/OBS MODERATE 35: CPT | Performed by: HOSPITALIST

## 2023-02-10 PROCEDURE — 85610 PROTHROMBIN TIME: CPT

## 2023-02-10 PROCEDURE — 6370000000 HC RX 637 (ALT 250 FOR IP)

## 2023-02-10 PROCEDURE — 85025 COMPLETE CBC W/AUTO DIFF WBC: CPT

## 2023-02-10 PROCEDURE — 86901 BLOOD TYPING SEROLOGIC RH(D): CPT

## 2023-02-10 PROCEDURE — 2580000003 HC RX 258: Performed by: INTERNAL MEDICINE

## 2023-02-10 PROCEDURE — 80076 HEPATIC FUNCTION PANEL: CPT

## 2023-02-10 PROCEDURE — 86900 BLOOD TYPING SEROLOGIC ABO: CPT

## 2023-02-10 PROCEDURE — 83735 ASSAY OF MAGNESIUM: CPT

## 2023-02-10 PROCEDURE — 2580000003 HC RX 258: Performed by: FAMILY MEDICINE

## 2023-02-10 PROCEDURE — 80069 RENAL FUNCTION PANEL: CPT

## 2023-02-10 PROCEDURE — 82728 ASSAY OF FERRITIN: CPT

## 2023-02-10 PROCEDURE — 6370000000 HC RX 637 (ALT 250 FOR IP): Performed by: STUDENT IN AN ORGANIZED HEALTH CARE EDUCATION/TRAINING PROGRAM

## 2023-02-10 PROCEDURE — 99232 SBSQ HOSP IP/OBS MODERATE 35: CPT | Performed by: SURGERY

## 2023-02-10 RX ORDER — LABETALOL HYDROCHLORIDE 5 MG/ML
10 INJECTION, SOLUTION INTRAVENOUS
Status: DISCONTINUED | OUTPATIENT
Start: 2023-02-10 | End: 2023-02-11 | Stop reason: HOSPADM

## 2023-02-10 RX ORDER — LORAZEPAM 2 MG/ML
0.5 INJECTION INTRAMUSCULAR
Status: DISCONTINUED | OUTPATIENT
Start: 2023-02-10 | End: 2023-02-11 | Stop reason: HOSPADM

## 2023-02-10 RX ORDER — FENTANYL CITRATE 50 UG/ML
25 INJECTION, SOLUTION INTRAMUSCULAR; INTRAVENOUS EVERY 5 MIN PRN
Status: DISCONTINUED | OUTPATIENT
Start: 2023-02-10 | End: 2023-02-11 | Stop reason: HOSPADM

## 2023-02-10 RX ORDER — MEPERIDINE HYDROCHLORIDE 25 MG/ML
12.5 INJECTION INTRAMUSCULAR; INTRAVENOUS; SUBCUTANEOUS EVERY 5 MIN PRN
Status: DISCONTINUED | OUTPATIENT
Start: 2023-02-10 | End: 2023-02-11 | Stop reason: HOSPADM

## 2023-02-10 RX ORDER — PROCHLORPERAZINE EDISYLATE 5 MG/ML
5 INJECTION INTRAMUSCULAR; INTRAVENOUS
Status: DISCONTINUED | OUTPATIENT
Start: 2023-02-10 | End: 2023-02-11 | Stop reason: HOSPADM

## 2023-02-10 RX ORDER — DIPHENHYDRAMINE HYDROCHLORIDE 50 MG/ML
12.5 INJECTION INTRAMUSCULAR; INTRAVENOUS
Status: DISCONTINUED | OUTPATIENT
Start: 2023-02-10 | End: 2023-02-11 | Stop reason: HOSPADM

## 2023-02-10 RX ORDER — ONDANSETRON 2 MG/ML
4 INJECTION INTRAMUSCULAR; INTRAVENOUS
Status: DISCONTINUED | OUTPATIENT
Start: 2023-02-10 | End: 2023-02-11 | Stop reason: HOSPADM

## 2023-02-10 RX ORDER — SODIUM CHLORIDE 0.9 % (FLUSH) 0.9 %
5-40 SYRINGE (ML) INJECTION EVERY 12 HOURS SCHEDULED
Status: DISCONTINUED | OUTPATIENT
Start: 2023-02-10 | End: 2023-02-11 | Stop reason: HOSPADM

## 2023-02-10 RX ORDER — SODIUM CHLORIDE 0.9 % (FLUSH) 0.9 %
5-40 SYRINGE (ML) INJECTION PRN
Status: DISCONTINUED | OUTPATIENT
Start: 2023-02-10 | End: 2023-02-11 | Stop reason: HOSPADM

## 2023-02-10 RX ORDER — SODIUM CHLORIDE 9 MG/ML
25 INJECTION, SOLUTION INTRAVENOUS PRN
Status: DISCONTINUED | OUTPATIENT
Start: 2023-02-10 | End: 2023-02-11 | Stop reason: HOSPADM

## 2023-02-10 RX ORDER — SODIUM CHLORIDE 9 MG/ML
INJECTION, SOLUTION INTRAVENOUS CONTINUOUS
Status: DISCONTINUED | OUTPATIENT
Start: 2023-02-10 | End: 2023-02-11

## 2023-02-10 RX ADMIN — PIPERACILLIN AND TAZOBACTAM 3375 MG: 3; .375 INJECTION, POWDER, LYOPHILIZED, FOR SOLUTION INTRAVENOUS at 03:21

## 2023-02-10 RX ADMIN — ENOXAPARIN SODIUM 40 MG: 100 INJECTION SUBCUTANEOUS at 16:52

## 2023-02-10 RX ADMIN — PANTOPRAZOLE SODIUM 40 MG: 40 INJECTION, POWDER, LYOPHILIZED, FOR SOLUTION INTRAVENOUS at 08:24

## 2023-02-10 RX ADMIN — SODIUM CHLORIDE, PRESERVATIVE FREE 10 ML: 5 INJECTION INTRAVENOUS at 08:26

## 2023-02-10 RX ADMIN — Medication 3 MG: at 21:34

## 2023-02-10 RX ADMIN — TAMSULOSIN HYDROCHLORIDE 0.4 MG: 0.4 CAPSULE ORAL at 08:23

## 2023-02-10 RX ADMIN — SODIUM CHLORIDE: 9 INJECTION, SOLUTION INTRAVENOUS at 21:50

## 2023-02-10 RX ADMIN — SODIUM CHLORIDE: 9 INJECTION, SOLUTION INTRAVENOUS at 00:49

## 2023-02-10 RX ADMIN — PIPERACILLIN AND TAZOBACTAM 3375 MG: 3; .375 INJECTION, POWDER, LYOPHILIZED, FOR SOLUTION INTRAVENOUS at 17:56

## 2023-02-10 RX ADMIN — Medication 6 MILLICURIE: at 12:25

## 2023-02-10 RX ADMIN — PIPERACILLIN AND TAZOBACTAM 3375 MG: 3; .375 INJECTION, POWDER, LYOPHILIZED, FOR SOLUTION INTRAVENOUS at 10:32

## 2023-02-10 RX ADMIN — SODIUM CHLORIDE: 9 INJECTION, SOLUTION INTRAVENOUS at 10:22

## 2023-02-10 ASSESSMENT — PAIN SCALES - PAIN ASSESSMENT IN ADVANCED DEMENTIA (PAINAD)
NEGVOCALIZATION: 0
TOTALSCORE: 0
FACIALEXPRESSION: 0
BODYLANGUAGE: 0
BODYLANGUAGE: 0
BREATHING: 0
FACIALEXPRESSION: 0
BREATHING: 0
TOTALSCORE: 0
NEGVOCALIZATION: 0
CONSOLABILITY: 0
CONSOLABILITY: 0

## 2023-02-10 ASSESSMENT — PAIN SCALES - GENERAL
PAINLEVEL_OUTOF10: 0

## 2023-02-10 ASSESSMENT — ENCOUNTER SYMPTOMS
RESPIRATORY NEGATIVE: 1
EYES NEGATIVE: 1
GASTROINTESTINAL NEGATIVE: 1

## 2023-02-10 NOTE — PLAN OF CARE
Problem: Pain  Goal: Verbalizes/displays adequate comfort level or baseline comfort level  2/10/2023 1111 by Kalen Garza RN  Outcome: Progressing  Flowsheets (Taken 2/10/2023 0800)  Verbalizes/displays adequate comfort level or baseline comfort level:   Encourage patient to monitor pain and request assistance   Assess pain using appropriate pain scale   Administer analgesics based on type and severity of pain and evaluate response   Implement non-pharmacological measures as appropriate and evaluate response   Consider cultural and social influences on pain and pain management   Notify Licensed Independent Practitioner if interventions unsuccessful or patient reports new pain  Note: Discussed pharmacological and non-pharmacological interventions to assist with pain   2/10/2023 0608 by Kristin Redd RN  Outcome: Progressing  Flowsheets (Taken 2/9/2023 1626 by Kalen Garza RN)  Verbalizes/displays adequate comfort level or baseline comfort level:   Encourage patient to monitor pain and request assistance   Assess pain using appropriate pain scale     Problem: Safety - Adult  Goal: Free from fall injury  2/10/2023 1111 by Kalen Garza RN  Outcome: Progressing  Flowsheets  Taken 2/10/2023 1111  Free From Fall Injury: Instruct family/caregiver on patient safety  Taken 2/10/2023 0815  Free From Fall Injury: Instruct family/caregiver on patient safety  Note: Discussed falls precautions with patient and family and interventions used to promote patient safety   2/10/2023 0608 by Kristin Redd RN  Outcome: Progressing     Problem: Safety - Medical Restraint  Goal: Remains free of injury from restraints (Restraint for Interference with Medical Device)  Description: INTERVENTIONS:  1. Determine that other, less restrictive measures have been tried or would not be effective before applying the restraint  2. Evaluate the patient's condition at the time of restraint application  3.  Inform patient/family regarding the reason for restraint  4. Q2H: Monitor safety, psychosocial status, comfort, nutrition and hydration  2/10/2023 1111 by Sunil Devries RN  Outcome: Progressing  Flowsheets (Taken 2/10/2023 0800)  Remains free of injury from restraints (restraint for interference with medical device):   Determine that other, less restrictive measures have been tried or would not be effective before applying the restraint   Evaluate the patient's condition at the time of restraint application   Every 2 hours: Monitor safety, psychosocial status, comfort, nutrition and hydration   Inform patient/family regarding the reason for restraint  Note: Coordinating with family to be at bedside to assist with distraction and comforting patient. Video camera in use. Assisting with 5 P's.  Monitoring every 2 hours   2/10/2023 0608 by Lisa Mcelroy RN  Outcome: Progressing

## 2023-02-10 NOTE — PLAN OF CARE
Pt plans to return home w/ family. Problem: Discharge Planning  Goal: Discharge to home or other facility with appropriate resources  Outcome: Progressing     Pt showed nonverbal s/s of pain, PRN Dilaudid given    Problem: Pain  Goal: Verbalizes/displays adequate comfort level or baseline comfort level  Outcome: Progressing  Flowsheets (Taken 2/9/2023 1626 by Christian Perkins RN)  Verbalizes/displays adequate comfort level or baseline comfort level:   Encourage patient to monitor pain and request assistance   Assess pain using appropriate pain scale     Pt not redirectable this shift, believes he is just visiting \"here\" but does not know where here is. Alert to self only. Unknown to location, time, or situation. Required a dose of haldol was getting aggressive trying to leave and escaped from L wrist restraint. Haldol did manage to calm pt down    Problem: Confusion  Goal: Confusion, delirium, dementia, or psychosis is improved or at baseline  Description: INTERVENTIONS:  1. Assess for possible contributors to thought disturbance, including medications, impaired vision or hearing, underlying metabolic abnormalities, dehydration, psychiatric diagnoses, and notify attending LIP  2. [place] high risk fall precautions, as indicated  3. Provide frequent short contacts to provide reality reorientation, refocusing and direction  4. Decrease environmental stimuli, including noise as appropriate  5. Monitor and intervene to maintain adequate nutrition, hydration, elimination, sleep and activity  6. If unable to ensure safety without constant attention obtain sitter and review sitter guidelines with assigned personnel  7.  Initiate Psychosocial CNS and Spiritual Care consult, as indicated  Outcome: Progressing     Problem: Safety - Adult  Goal: Free from fall injury  Outcome: Progressing     No falls noted thus far this shift, bed in lowest position, alarm on, non-skid socks on, call light within reach, hourly checks, safety maintained, will continue to monitor. Avasys camera in place as well. Problem: Risk for Elopement  Goal: Patient will not exit the unit/facility without proper excort  Outcome: Progressing     Problem: Neurosensory - Adult  Goal: Achieves stable or improved neurological status  Outcome: Progressing     Problem: Cardiovascular - Adult  Goal: Maintains optimal cardiac output and hemodynamic stability  Outcome: Progressing     Problem: Gastrointestinal - Adult  Goal: Minimal or absence of nausea and vomiting  Outcome: Progressing     Problem: Genitourinary - Adult  Goal: Absence of urinary retention  Outcome: Progressing     Problem: Safety - Medical Restraint  Goal: Remains free of injury from restraints (Restraint for Interference with Medical Device)  Description: INTERVENTIONS:  1. Determine that other, less restrictive measures have been tried or would not be effective before applying the restraint  2. Evaluate the patient's condition at the time of restraint application  3. Inform patient/family regarding the reason for restraint  4.  Q2H: Monitor safety, psychosocial status, comfort, nutrition and hydration  2/10/2023 0608 by Jael Sotelo RN  Outcome: Progressing

## 2023-02-10 NOTE — PLAN OF CARE
Problem: Safety - Medical Restraint  Goal: Remains free of injury from restraints (Restraint for Interference with Medical Device)  Description: INTERVENTIONS:  1. Determine that other, less restrictive measures have been tried or would not be effective before applying the restraint  2. Evaluate the patient's condition at the time of restraint application  3. Inform patient/family regarding the reason for restraint  4. Q2H: Monitor safety, psychosocial status, comfort, nutrition and hydration  2/9/2023 1947 by Deshaun Garcia RN  Flowsheets (Taken 2/9/2023 1800)  Remains free of injury from restraints (restraint for interference with medical device):   Determine that other, less restrictive measures have been tried or would not be effective before applying the restraint   Inform patient/family regarding the reason for restraint   Every 2 hours: Monitor safety, psychosocial status, comfort, nutrition and hydration   Evaluate the patient's condition at the time of restraint application  Note: Assessing every 2 hours for need of restraints. Discussed with wife about need to restraints due to patient pulling at medical tubing necessary for treatment. Performing enviornmental changes, addressing 5 P's, and re-orienting. 2/9/2023 1837 by Deshaun Garcia RN  Flowsheets (Taken 2/9/2023 1800)  Remains free of injury from restraints (restraint for interference with medical device):   Determine that other, less restrictive measures have been tried or would not be effective before applying the restraint   Inform patient/family regarding the reason for restraint   Every 2 hours: Monitor safety, psychosocial status, comfort, nutrition and hydration   Evaluate the patient's condition at the time of restraint application  Note: Family at bedside assisting in improving patient behavior. Performing assessments every 2 hours on restraints.    2/9/2023 1111 by Deshaun Garcia RN  Outcome: Progressing  Flowsheets  Taken 2/9/2023 0800  Remains free of injury from restraints (restraint for interference with medical device): Determine that other, less restrictive measures have been tried or would not be effective before applying the restraint  Taken 2/9/2023 0730  Remains free of injury from restraints (restraint for interference with medical device):   Determine that other, less restrictive measures have been tried or would not be effective before applying the restraint   Inform patient/family regarding the reason for restraint   Every 2 hours: Monitor safety, psychosocial status, comfort, nutrition and hydration   Evaluate the patient's condition at the time of restraint application  5/4/6284 [id number] by Marlin Saleem RN  Outcome: Progressing  Flowsheets (Taken 2/9/2023 4945)  Remains free of injury from restraints (restraint for interference with medical device):   Determine that other, less restrictive measures have been tried or would not be effective before applying the restraint   Evaluate the patient's condition at the time of restraint application   Inform patient/family regarding the reason for restraint   Every 2 hours: Monitor safety, psychosocial status, comfort, nutrition and hydration

## 2023-02-10 NOTE — PROGRESS NOTES
General Surgery   Daily Progress Note  Patient: Bess Bridges      CC: Dilated common bile duct     SUBJECTIVE:   No acute events overnight. Mentation slightly improved this morning. Oriented to person and place. Denies abdominal pain, nausea or vomiting. ROS:   A 14 point review of systems was conducted, significant findings as noted above. All other systems negative. OBJECTIVE:    PHYSICAL EXAM:    Vitals:    02/09/23 2252 02/09/23 2322 02/10/23 0002 02/10/23 0344   BP:   (!) 126/59 (!) 153/48   Pulse:   90 77   Resp: 20 16 14 16   Temp:   98.2 °F (36.8 °C) 98.2 °F (36.8 °C)   TempSrc:   Axillary Axillary   SpO2:   93% 98%   Weight:    184 lb 11.9 oz (83.8 kg)   Height:           General appearance: alert, no acute distress, grooming appropriate  Eyes: No scleral icterus, EOM grossly intact  Neck: trachea midline, neck supple  Chest/Lungs: normal effort with no accessory muscle use on RA  Cardiovascular: RRR, well perfused  Abdomen: Soft, non-tender, non-distended, no rebound, no guarding or rigidity.     Skin: warm and dry, no rashes  Extremities: no edema, no cyanosis  Genitourinary: Grossly normal  Neuro: A&Ox2, no focal deficits, sensation intact    LABS:   Recent Labs     02/09/23  0825 02/10/23  0343   WBC 8.9 6.9   HGB 12.3* 11.8*   HCT 37.5* 35.6*   MCV 94.9 95.3    169          Recent Labs     02/09/23  0800 02/09/23  0830 02/10/23  0343   NA  --  138 142   K  --  4.3 4.4   CL  --  102 108   CO2  --  23 23   PHOS 2.9  --  4.0   BUN  --  18 15   CREATININE  --  0.8 0.9          Recent Labs     02/09/23  0830 02/10/23  0343   * 68*   * 110*   BILIDIR <0.2 <0.2   BILITOT 0.9  0.9 0.5   ALKPHOS 173* 143*          Recent Labs     02/07/23  2359 02/08/23  0914   LIPASE 33.0 15.0          Recent Labs     02/08/23  0559 02/09/23  0830 02/10/23  0343   PROT  --  5.6* 5.0*   INR 1.04  --  1.12          Recent Labs     02/08/23  0322 02/08/23  0559   CKTOTAL  --  80   TROPONINI <0.01 --            ASSESSMENT & PLAN:   This is a 68 y.o. male with Hx of epigastric abdominal pain, nausea, and emesis in the setting of elevated LFT's without evidence of clear cholecystitis or stones on RUQ US.     - Symptoms improved  - ERCP completed yesterday with GI with no evidence of stones or purulent material in CBD, with mildly dilated CBD to 10mm  - Follow up HIDA scan today  - Pre-op and consented for possible laparoscopic cholecystectomy later today pending HIDA results  - Continue Zosyn  - Patient to remain NPO until procedure     Heri Cerda DO, MS  PGY2, General Surgery  02/10/23  6:40 AM  [phone]     I have seen, examined, and reviewed the patients chart. I agree with the residents assessment and have made appropriate changes.     Brandin Hummel

## 2023-02-10 NOTE — CARE COORDINATION
CM continues to follow for DC planning and needs. GI following, HIDA scan done today and plan for lap lia this admission. Patient from home with spouse, would benefit from PT/OT evals post op, when appropriate. Current plan at DC is for return home with spouse.     Thank you,  Idalia Helm RN,   4th Floor Progressive Care Unit  [phone]

## 2023-02-10 NOTE — PROGRESS NOTES
Progress Note    Admit Date: 2/7/2023  Day: 3  Diet: Diet NPO    CC: Abdominal pain    Interval history: Patient seen and examined this morning at bedside. He was resting comfortably in his bed. Overnight patient did become a little confused and was able to sleep better is 3.0 restraints. He was given a dose of Haldol and this calmed him down. He is scheduled for HIDA scan today and depending on the results may go for a cholecystectomy after. He has no complaints at this time. He remains hemodynamically stable and afebrile. HPI: Chidi Herbert is a 68 y.o. male with a PMHx: HTN, CAD, DM, Prior MI who presented to the ED c/o of N/V and abdominal pain onset 4 hours prior to presentation. Per spouse at bedside he had 2 episodes of vomiting- non bilious, mainly stomach contents with no blood. After his second episode of emesis his abdominal pain improved. Patient described abdominal pain mainly in the epigastric region that initially resolved when he presented to the ED however it started to increase again. Last bowel movement noted to be same day of ED presentation. Due to patient having a prior MI wife reported that she was very concerned that he was having another 1 therefore she gave him a sublingual nitroglycerin tablet which did not improve his abdominal pain. In the ED patient reported that the pain felt much different from when he had his myocardial infarction. On presentation to the ED he was hemodynamically stable, CBC was unremarkable and BMP also unremarkable however CMP with elevated alkaline phosphatase, ALT/AST. Urinalysis was not revealing of any infection. CT abdomen overall revealed no acute abnormalities however there was some periportal edema seen. Per ED physician when he returned from his CT abdomen he was noted to be more confused and febrile at 100.6. Per wife he does wax and wane with confusion over the past year and a half therefore she was not remarkably concerned.   He was given a dose of Haldol 2.5 mg in the ED due to him being agitated and attempting to leave the emergency department. He was noted not to have decision-making capacity. Patient seen at bedside. Only alert to name however he Responded That He Does Not Know the Time of Year Alteplase. He is able to hold a conversation and answer questions concerning his symptoms. Wife reports that early on during the day he had a mild headache. He denies any chest pain, palpitations, diarrhea, dizziness or lightheadedness. He will be admitted for further evaluation of his elevated liver enzymes, confusion and new fever. Medications:     Scheduled Meds:   melatonin  3 mg Oral Nightly    tamsulosin  0.4 mg Oral Daily    [Held by provider] aspirin  81 mg Oral Daily    sodium chloride flush  5-40 mL IntraVENous 2 times per day    enoxaparin  40 mg SubCUTAneous Daily    piperacillin-tazobactam  3,375 mg IntraVENous q8h    pantoprazole  40 mg IntraVENous Daily     Continuous Infusions:   sodium chloride      sodium chloride 100 mL/hr at 02/10/23 0841     PRN Meds:HYDROmorphone, sodium chloride flush, sodium chloride, ondansetron **OR** ondansetron, polyethylene glycol, acetaminophen **OR** acetaminophen    Objective:   Vitals:   T-max:  Patient Vitals for the past 8 hrs:   BP Temp Temp src Pulse Resp SpO2 Weight   02/10/23 0800 (!) 142/72 98.1 °F (36.7 °C) Oral 73 18 95 % --   02/10/23 0344 (!) 153/48 98.2 °F (36.8 °C) Axillary 77 16 98 % 184 lb 11.9 oz (83.8 kg)       Intake/Output Summary (Last 24 hours) at 2/10/2023 0852  Last data filed at 2/10/2023 2625  Gross per 24 hour   Intake 2945 ml   Output 1450 ml   Net 1495 ml       Review of Systems   Constitutional: Negative. HENT: Negative. Eyes: Negative. Respiratory: Negative. Cardiovascular: Negative. Gastrointestinal: Negative. Genitourinary: Negative. Musculoskeletal: Negative. Skin: Negative. Neurological: Negative. Psychiatric/Behavioral: Negative. Physical Exam  Vitals reviewed. Constitutional:       General: He is awake. Appearance: Normal appearance. He is normal weight. Interventions: He is restrained. HENT:      Head: Normocephalic and atraumatic. Eyes:      Extraocular Movements: Extraocular movements intact. Conjunctiva/sclera: Conjunctivae normal.      Pupils: Pupils are equal, round, and reactive to light. Cardiovascular:      Rate and Rhythm: Normal rate and regular rhythm. Heart sounds: Normal heart sounds. Pulmonary:      Effort: Pulmonary effort is normal.      Breath sounds: Normal breath sounds. Abdominal:      General: Abdomen is flat. Bowel sounds are normal.      Palpations: Abdomen is soft. Musculoskeletal:         General: Normal range of motion. Skin:     General: Skin is warm and dry. Neurological:      General: No focal deficit present. Mental Status: He is alert and oriented to person, place, and time. Mental status is at baseline. Psychiatric:         Mood and Affect: Mood normal.         Behavior: Behavior normal. Behavior is cooperative.        LABS:    CBC:   Recent Labs     02/08/23  0558 02/09/23  0825 02/10/23  0343   WBC 14.5* 8.9 6.9   HGB 12.4* 12.3* 11.8*   HCT 36.8* 37.5* 35.6*    185 169   MCV 93.3 94.9 95.3     Renal:    Recent Labs     02/08/23  0558 02/09/23  0800 02/09/23  0830 02/10/23  0343     --  138 142   K 4.1  --  4.3 4.4   CL 96*  --  102 108   CO2 23  --  23 23   BUN 20  --  18 15   CREATININE 0.8  --  0.8 0.9   GLUCOSE 156*  --  126* 113*   CALCIUM 9.5  --  8.6 8.2*   MG 1.40*  --  2.00 1.90   PHOS  --  2.9  --  4.0   ANIONGAP 17*  --  13 11     Hepatic:   Recent Labs     02/08/23  0558 02/08/23  1454 02/09/23  0830 02/10/23  0343   *  --  110* 68*   *  --  156* 110*   BILITOT 0.9 1.0 0.9  0.9 0.5   BILIDIR  --  <0.2 <0.2 <0.2   PROT 6.5  --  5.6* 5.0*   LABALBU 4.3  --  3.2* 3.3*   ALKPHOS 233* --  173* 143*     Troponin:   Recent Labs     02/08/23  0322   TROPONINI <0.01     BNP: No results for input(s): BNP in the last 72 hours. Lipids: No results for input(s): CHOL, HDL in the last 72 hours. Invalid input(s): LDLCALCU, TRIGLYCERIDE  ABGs:  No results for input(s): PHART, JBI8BJH, PO2ART, BDT8CKL, BEART, THGBART, U4GTTTHE, DSV8QFL in the last 72 hours. INR:   Recent Labs     02/08/23  0559 02/10/23  0343   INR 1.04 1.12     Lactate: No results for input(s): LACTATE in the last 72 hours. Cultures:  -----------------------------------------------------------------  RAD:   FL ERCP BILIARY S&I   Final Result      Intraoperative fluoroscopy during ERCP. For additional details please see the medical record for a procedural note by the performing physician. MRI ABDOMEN W WO CONTRAST MRCP   Final Result      1. Mild distention of the gallbladder with significant wall thickening. Small amount of pericholecystic fluid is nonspecific. Possible tiny gallstone at the neck is favored to be artifactual. Otherwise no gallstones demonstrated. 2.  Dilation of the common bile duct measuring up to 12 mm with mild diffuse intrahepatic duct dilation. This is not specific. No focal stricture, choledocholithiasis, or obstructing mass evident. 3.  Multiple duodenal diverticula including duodenal diverticula at the ampulla. No significant compression on the adjacent distal common bile duct and pancreatic ducts demonstrated. 4.  Benign cyst in the interpolar right kidney measuring 4.6 cm.   5.  Nonspecific tiny cystic lesions in the pancreas. Largest measures 6 mm at the inferior pancreatic body. These could reflect side branch IPMN. Based on current guidelines, recommend follow-up abdominal MRI in 2 years to evaluate stability. US ABDOMEN LIMITED   Final Result   1. Nonspecific gallbladder wall thickening without shadowing calculus or pain over the gallbladder.  Cholecystitis is considered unlikely but cannot be entirely excluded. 2. Dilated common bile duct without definite obstructing abnormality. Consider further evaluation with MRCP or ERCP. 3. Equivocal edema along the pancreas which appeared normal on CT of the abdomen from earlier today. XR CHEST PORTABLE   Final Result   1. Mild left lower lobe atelectasis or infiltrate. CT ABDOMEN PELVIS W IV CONTRAST Additional Contrast? Oral   Final Result      No acute abdominopelvic abnormality. INCIDENTAL FINDINGS: None. NM HEPATOBILIARY    (Results Pending)       Assessment/Plan:   Abdominal pain   Elevated liver enzymes  CT abdomen and pelvis only revealing mild periportal edema  Patient with no leukocytosis and jaundice however during ED stay he became altered with an episode of hypotension and febrile. Moderate elevation in AST and ALT 5x ULN. He likely had a gallstone which already passed indicated by elevated alk phos but normal bilirubin. Received Zosyn in the ED  -Continue Zosyn  -Procalcitonin elevated at 5.9, CRP normal at 3.9, and total CK was 252  -Hepatitis panel nonreactive for all  -Blood cultures x2 grew E. coli. Continue on Zosyn  -Daily CMP  -RUQ done on 2/8 showed nonspecific gallbladder wall thickening and dilated common bile duct without definite obstructing abnormality. -MRCP showed mild distention of the gallbladder with significant wall thickening. Possible tiny gallstone at the neck is favored to be artifactual.  Dilation of the common bile duct measuring up to 12 mm with diffuse Intermatic duct dilation.   -GGT slightly elevated at 116  -Lipase level normal at 15  -Bilirubin levels normal  -Alk phos, ALT, AST all downtrending  -General surgery following  -ERCP on 02/09/23 showed dilated common bile duct with no evidence of stones or purulent material.  -Patient is scheduled to have a HIDA scan done and possible cholecystectomy later this afternoon     Sepsis-Resolved  Most probably secondary to acute cholecystitis. Patient presenting with elevated temperature and tachycardia but no clear source of infection. Received 1 L bolus in the ED and started on IV fluid  -Leukocytosis has resolved  -Trend WBC with daily CBC     Lactic acidosis-Resolved  Patient received a 1 L LR bolus in the ED.  -LA 2.3->4.4->2.2->2.0     Hx of Hypertension  Pt now hypotensive  -Holding home antiHTN    Code Status: Full code  FEN: N.p.o.  PPX: Lovenox  DISPO: RACHELLE Russ DO PGY-1  02/10/23  8:52 AM    This patient has been staffed and discussed with Lanora Kanner, MD.     Addendum to Resident H& P/Progress note:  I have personally seen,examined and evaluated the patient.  I have reviewed the current history, physical findings, labs and assessment and plan and agree with note as documented by resident MD ( Xiao Jeff)      Lanora Kanner, MD, [facility]

## 2023-02-10 NOTE — ANESTHESIA PRE PROCEDURE
Department of Anesthesiology  Preprocedure Note       Name:  Romelia Anthony   Age:  68 y.o.  :  1945                                          MRN:  [id number]         Date:  2/10/2023      Surgeon: Ami Crow):  Rigoberto Paredes MD    Procedure: Procedure(s):  LAPROSCOPIC CHOLECYSTECTOMY  WITH INTRA-OPERATIVE CHOLANGIOGRAM    Medications prior to admission:   Prior to Admission medications    Medication Sig Start Date End Date Taking? Authorizing Provider   metFORMIN (GLUCOPHAGE) 1000 MG tablet TAKE 1 TABLET BY MOUTH TWICE A DAY WITH MEALS 22   Rosalia Goel MD   metoprolol tartrate (LOPRESSOR) 25 MG tablet TAKE 1/2 TABLET TWICE DAILY 22   NORMAN Lopez - CNP   atorvastatin (LIPITOR) 80 MG tablet TAKE 1 TABLET EVERY DAY 22   NORMAN Lopez - CNP   ramipril (ALTACE) 5 MG capsule TAKE 1 CAPSULE EVERY DAY 22   NORMAN Lopez - CNP   nitroGLYCERIN (NITROSTAT) 0.4 MG SL tablet Place 1 tablet under the tongue every 5 minutes as needed (PRN) 21   Cris Soliman MD   esomeprazole (NEXIUM) 20 MG delayed release capsule Take 20 mg by mouth every morning (before breakfast)    Historical Provider, MD   aspirin 81 MG EC tablet Take 81 mg by mouth daily Take 1/2 tablet twice daily    Historical Provider, MD       Current medications:    No current facility-administered medications for this visit. No current outpatient medications on file.      Facility-Administered Medications Ordered in Other Visits   Medication Dose Route Frequency Provider Last Rate Last Admin    melatonin tablet 3 mg  3 mg Oral Nightly Lashawn Suarez MD        tamsulosin [facility]) capsule 0.4 mg  0.4 mg Oral Daily Vaibhav Villa DO   0.4 mg at 02/10/23 1178    HYDROmorphone (DILAUDID) injection 1 mg  1 mg IntraVENous Q4H PRN Morelia Bass MD   1 mg at 23 2252    [Held by provider] aspirin EC tablet 81 mg  81 mg Oral Daily Lashawn Suarez MD   81 mg at 23 0903    sodium chloride flush 0.9 % injection 5-40 mL  5-40 mL IntraVENous 2 times per day Mayra Curtis MD   10 mL at 02/10/23 0826    sodium chloride flush 0.9 % injection 5-40 mL  5-40 mL IntraVENous PRN Mayra Curtis MD        0.9 % sodium chloride infusion   IntraVENous PRN Mayra Curtis MD        enoxaparin (LOVENOX) injection 40 mg  40 mg SubCUTAneous Daily Mayra Curtis MD   40 mg at 02/09/23 1711    ondansetron (ZOFRAN-ODT) disintegrating tablet 4 mg  4 mg Oral Q8H PRN Mayra Curtis MD        Or    ondansetron [facility]) injection 4 mg  4 mg IntraVENous Q6H PRN Mayra Curtis MD        polyethylene glycol (GLYCOLAX) packet 17 g  17 g Oral Daily PRN Mayra Curtis MD        acetaminophen (TYLENOL) tablet 650 mg  650 mg Oral Q6H PRN Mayra Curtis MD        Or    acetaminophen (TYLENOL) suppository 650 mg  650 mg Rectal Q6H PRN Mayra Curtis MD        piperacillin-tazobactam (ZOSYN) 3,375 mg in sodium chloride 0.9 % 50 mL IVPB (mini-bag)  3,375 mg IntraVENous q8h Mayra Curtis MD 12.5 mL/hr at 02/10/23 1032 3,375 mg at 02/10/23 1032    0.9 % sodium chloride infusion   IntraVENous Continuous Angel Boyce  mL/hr at 02/10/23 1022 New Bag at 02/10/23 1022    pantoprazole (PROTONIX) injection 40 mg  40 mg IntraVENous Daily Tuan Vasquez MD   40 mg at 02/10/23 0851       Allergies:     Allergies   Allergen Reactions    Codeine     Sulfa Antibiotics Nausea And Vomiting       Problem List:    Patient Active Problem List   Diagnosis Code    HLD (hyperlipidemia) E78.5    Essential hypertension, benign I10    Old myocardial infarction I25.2    Coronary atherosclerosis of native coronary artery I25.10    Respiratory failure (HCC) J96.90    Hemorrhagic shock (HCC) R57.8    Alicja-Cancino tear K22.6    Pre-syncope R55    GI bleed K92.2    History of lower GI bleeding Z87.19    Encounter to establish care Z76.89    Tinea corporis B35.4    Skin lesion L98.9    Hyperglycemia R73.9    Right upper quadrant abdominal pain R10.11    Elevated liver enzymes R74.8    Sepsis without acute organ dysfunction (HCC) A41.9    Abdominal pain, epigastric R10.13    LFT elevation R79.89    Lactic acidosis E87.20    Nausea and vomiting R11.2    Ascending cholangitis K83.09    E coli bacteremia R78.81, B96.20    Acute cholecystitis K81.0       Past Medical History:        Diagnosis Date    CAD (coronary artery disease)     Hyperlipidemia     Hypertension     Lower GI bleed     Myocardial infarction (HCC)     Sensorineural hearing loss of right ear     Shingles        Past Surgical History:        Procedure Laterality Date    CORONARY ANGIOPLASTY WITH STENT PLACEMENT      ERCP N/A 2/9/2023    ERCP SPHINCTER/PAPILLOTOMY performed by Vinod Hawkins MD at [facility] ERCP N/A 2/9/2023    ERCP STONE REMOVAL performed by Vinod Hawkins MD at [place] [address]      received clipping for aina rice tear       Social History:    Social History     Tobacco Use    Smoking status: Never    Smokeless tobacco: Former   Substance Use Topics    Alcohol use: No                                Counseling given: Not Answered      Vital Signs (Current): There were no vitals filed for this visit.                                            BP Readings from Last 3 Encounters:   02/10/23 (!) 154/80   11/02/22 (!) 174/92   05/04/22 132/74       NPO Status:                                                                                 BMI:   Wt Readings from Last 3 Encounters:   02/10/23 184 lb 11.9 oz (83.8 kg)   02/08/23 195 lb (88.5 kg)   11/02/22 192 lb 12.8 oz (87.5 kg)     There is no height or weight on file to calculate BMI.    CBC:   Lab Results   Component Value Date/Time    WBC 6.9 02/10/2023 03:43 AM    RBC 3.73 02/10/2023 03:43 AM    HGB 11.8 02/10/2023 03:43 AM    HCT 35.6 02/10/2023 03:43 AM    MCV 95.3 02/10/2023 03:43 AM    RDW 13.9 02/10/2023 03:43 AM     02/10/2023 03:43 AM       CMP:   Lab Results   Component Value Date/Time     02/10/2023 03:43 AM    K 4.4 02/10/2023 03:43 AM     02/10/2023 03:43 AM    CO2 23 02/10/2023 03:43 AM    BUN 15 02/10/2023 03:43 AM    CREATININE 0.9 02/10/2023 03:43 AM    GFRAA >60 01/22/2021 10:24 AM    AGRATIO 1.3 02/09/2023 08:30 AM    LABGLOM >60 02/10/2023 03:43 AM    GLUCOSE 113 02/10/2023 03:43 AM    PROT 5.0 02/10/2023 03:43 AM    CALCIUM 8.2 02/10/2023 03:43 AM    BILITOT 0.5 02/10/2023 03:43 AM    ALKPHOS 143 02/10/2023 03:43 AM    AST 68 02/10/2023 03:43 AM     02/10/2023 03:43 AM       POC Tests:   Recent Labs     02/10/23  1159   POCGLU 86       Coags:   Lab Results   Component Value Date/Time    PROTIME 14.3 02/10/2023 03:43 AM    INR 1.12 02/10/2023 03:43 AM    APTT 20.9 06/12/2015 03:56 AM       HCG (If Applicable): No results found for: PREGTESTUR, PREGSERUM, HCG, HCGQUANT     ABGs:   Lab Results   Component Value Date/Time    PHART 7.28 06/12/2015 07:08 AM    PO2ART 365 06/12/2015 07:08 AM    EAT5MRS 44 06/12/2015 07:08 AM    MOH4HEF 20 06/12/2015 07:08 AM    BEART -6.0 06/12/2015 07:08 AM    U1VHXKNM 100 06/12/2015 07:08 AM        Type & Screen (If Applicable):  No results found for: LABABO, LABRH    Drug/Infectious Status (If Applicable):  No results found for: HIV, HEPCAB    COVID-19 Screening (If Applicable):   Lab Results   Component Value Date/Time    COVID19 NOT DETECTED 02/08/2023 06:00 AM    COVID19 NOT DETECTED 12/15/2020 08:48 PM    COVID19 DETECTED 11/24/2020 09:06 AM           Anesthesia Evaluation  Patient summary reviewed and Nursing notes reviewed no history of anesthetic complications:   Airway: Mallampati: II  TM distance: >3 FB   Neck ROM: full  Mouth opening: > = 3 FB   Dental:          Pulmonary:Negative Pulmonary ROS                              Cardiovascular:    (+) hypertension:, past MI:, CAD:, hyperlipidemia                  Neuro/Psych: Negative Neuro/Psych ROS              GI/Hepatic/Renal: Neg GI/Hepatic/Renal ROS            Endo/Other: Negative Endo/Other ROS                    Abdominal:             Vascular: negative vascular ROS. Other Findings:           Anesthesia Plan      general     ASA 1    (72-year-old male presents for ERCP DIAGNOSTIC. Plan general anesthesia with ASA standard monitors. Questions answered. Patient agreeable with anesthetic plan.  )  Induction: intravenous. Anesthetic plan and risks discussed with patient. Plan discussed with CRNA. Attending anesthesiologist reviewed and agrees with Tito Samaniego MD   2/10/2023        Department of Anesthesiology  Preprocedure Note       Name:  Lucy Ho   Age:  68 y.o.  :  1945                                          MRN:  [id number]         Date:  2/10/2023      Surgeon: Annamarie Vazquez):  Mary Anne Deleon MD    Procedure: Procedure(s):  LAPROSCOPIC CHOLECYSTECTOMY  WITH INTRA-OPERATIVE CHOLANGIOGRAM    Medications prior to admission:   Prior to Admission medications    Medication Sig Start Date End Date Taking?  Authorizing Provider   metFORMIN (GLUCOPHAGE) 1000 MG tablet TAKE 1 TABLET BY MOUTH TWICE A DAY WITH MEALS 22   Diana Frey MD   metoprolol tartrate (LOPRESSOR) 25 MG tablet TAKE 1/2 TABLET TWICE DAILY 22   Olive Bob, APRN - CNP   atorvastatin (LIPITOR) 80 MG tablet TAKE 1 TABLET EVERY DAY 22   Shira Rojas, APRN - CNP   ramipril (ALTACE) 5 MG capsule TAKE 1 CAPSULE EVERY DAY 22   Olive Bob, APRN - CNP   nitroGLYCERIN (NITROSTAT) 0.4 MG SL tablet Place 1 tablet under the tongue every 5 minutes as needed (PRN) 21   Jordan Rocha MD   esomeprazole (NEXIUM) 20 MG delayed release capsule Take 20 mg by mouth every morning (before breakfast)    Historical Provider, MD   aspirin 81 MG EC tablet Take 81 mg by mouth daily Take 1/2 tablet twice daily    Historical Provider, MD Current medications:    Current Facility-Administered Medications   Medication Dose Route Frequency Provider Last Rate Last Admin    melatonin tablet 3 mg  3 mg Oral Nightly Sana Navarro MD        tamsulosin [facility]) capsule 0.4 mg  0.4 mg Oral Daily Mireya Colfax, DO   0.4 mg at 02/10/23 6799    HYDROmorphone (DILAUDID) injection 1 mg  1 mg IntraVENous Q4H PRN Pollo Dunlap MD   1 mg at 02/09/23 2252    [Held by provider] aspirin EC tablet 81 mg  81 mg Oral Daily Sana Navarro MD   81 mg at 02/08/23 6115    sodium chloride flush 0.9 % injection 5-40 mL  5-40 mL IntraVENous 2 times per day Sana Navarro MD   10 mL at 02/10/23 0826    sodium chloride flush 0.9 % injection 5-40 mL  5-40 mL IntraVENous PRN Sana Navarro MD        0.9 % sodium chloride infusion   IntraVENous PRN Sana Navarro MD        enoxaparin (LOVENOX) injection 40 mg  40 mg SubCUTAneous Daily Sana Navarro MD   40 mg at 02/09/23 1711    ondansetron (ZOFRAN-ODT) disintegrating tablet 4 mg  4 mg Oral Q8H PRN Sana Navarro MD        Or    [facility]) injection 4 mg  4 mg IntraVENous Q6H PRN Sana Navarro MD        polyethylene glycol (GLYCOLAX) packet 17 g  17 g Oral Daily PRN Sana Navarro MD        acetaminophen (TYLENOL) tablet 650 mg  650 mg Oral Q6H PRN Sana Navarro MD        Or    acetaminophen (TYLENOL) suppository 650 mg  650 mg Rectal Q6H PRN Sana Naavrro MD        piperacillin-tazobactam (ZOSYN) 3,375 mg in sodium chloride 0.9 % 50 mL IVPB (mini-bag)  3,375 mg IntraVENous q8h Sana Navarro MD 12.5 mL/hr at 02/10/23 1032 3,375 mg at 02/10/23 1032    0.9 % sodium chloride infusion   IntraVENous Continuous Mireya Colfax,  mL/hr at 02/10/23 1022 New Bag at 02/10/23 1022    pantoprazole (PROTONIX) injection 40 mg  40 mg IntraVENous Daily Verdene Curling, MD   40 mg at 02/10/23 3083       Allergies:     Allergies   Allergen Reactions    Codeine  Sulfa Antibiotics Nausea And Vomiting       Problem List:    Patient Active Problem List   Diagnosis Code    HLD (hyperlipidemia) E78.5    Essential hypertension, benign I10    Old myocardial infarction I25.2    Coronary atherosclerosis of native coronary artery I25.10    Respiratory failure (HCC) J96.90    Hemorrhagic shock (HCC) R57.8    Alicja-Cancino tear K22.6    Pre-syncope R55    GI bleed K92.2    History of lower GI bleeding Z87.19    Encounter to establish care Z76.89    Tinea corporis B35.4    Skin lesion L98.9    Hyperglycemia R73.9    Right upper quadrant abdominal pain R10.11    Elevated liver enzymes R74.8    Sepsis without acute organ dysfunction (HCC) A41.9    Abdominal pain, epigastric R10.13    LFT elevation R79.89    Lactic acidosis E87.20    Nausea and vomiting R11.2    Ascending cholangitis K83.09    E coli bacteremia R78.81, B96.20    Acute cholecystitis K81.0       Past Medical History:        Diagnosis Date    CAD (coronary artery disease)     Hyperlipidemia     Hypertension     Lower GI bleed     Myocardial infarction (HCC)     Sensorineural hearing loss of right ear     Shingles        Past Surgical History:        Procedure Laterality Date    CORONARY ANGIOPLASTY WITH STENT PLACEMENT      ERCP N/A 2/9/2023    ERCP SPHINCTER/PAPILLOTOMY performed by Akhil Castelan MD at [facility] ERCP N/A 2/9/2023    ERCP STONE REMOVAL performed by Akhil Castelan MD at [address] [place] [address]      received clipping for alicja cancino tear       Social History:    Social History     Tobacco Use    Smoking status: Never    Smokeless tobacco: Former   Substance Use Topics    Alcohol use:  No                                Counseling given: Not Answered      Vital Signs (Current):   Vitals:    02/10/23 0344 02/10/23 0800 02/10/23 0809 02/10/23 1118   BP: (!) 153/48 (!) 142/72  (!) 154/80   Pulse: 77 73 73 60   Resp: 16 18  12   Temp: 98.2 °F (36.8 °C) 98.1 °F (36.7 °C)  98.5 °F (36.9 °C)   TempSrc: Axillary Oral  Oral   SpO2: 98% 95%  99%   Weight: 184 lb 11.9 oz (83.8 kg)      Height:                                                  BP Readings from Last 3 Encounters:   02/10/23 (!) 154/80   11/02/22 (!) 174/92   05/04/22 132/74       NPO Status: Time of last liquid consumption: 1900                        Time of last solid consumption: 1900                        Date of last liquid consumption: 02/08/23 (per floor RN)                        Date of last solid food consumption: 02/08/23 (per FLoor RN )    BMI:   Wt Readings from Last 3 Encounters:   02/10/23 184 lb 11.9 oz (83.8 kg)   02/08/23 195 lb (88.5 kg)   11/02/22 192 lb 12.8 oz (87.5 kg)     Body mass index is 24.37 kg/m².     CBC:   Lab Results   Component Value Date/Time    WBC 6.9 02/10/2023 03:43 AM    RBC 3.73 02/10/2023 03:43 AM    HGB 11.8 02/10/2023 03:43 AM    HCT 35.6 02/10/2023 03:43 AM    MCV 95.3 02/10/2023 03:43 AM    RDW 13.9 02/10/2023 03:43 AM     02/10/2023 03:43 AM       CMP:   Lab Results   Component Value Date/Time     02/10/2023 03:43 AM    K 4.4 02/10/2023 03:43 AM     02/10/2023 03:43 AM    CO2 23 02/10/2023 03:43 AM    BUN 15 02/10/2023 03:43 AM    CREATININE 0.9 02/10/2023 03:43 AM    GFRAA >60 01/22/2021 10:24 AM    AGRATIO 1.3 02/09/2023 08:30 AM    LABGLOM >60 02/10/2023 03:43 AM    GLUCOSE 113 02/10/2023 03:43 AM    PROT 5.0 02/10/2023 03:43 AM    CALCIUM 8.2 02/10/2023 03:43 AM    BILITOT 0.5 02/10/2023 03:43 AM    ALKPHOS 143 02/10/2023 03:43 AM    AST 68 02/10/2023 03:43 AM     02/10/2023 03:43 AM       POC Tests:   Recent Labs     02/10/23  1159   POCGLU 86       Coags:   Lab Results   Component Value Date/Time    PROTIME 14.3 02/10/2023 03:43 AM    INR 1.12 02/10/2023 03:43 AM    APTT 20.9 06/12/2015 03:56 AM       HCG (If Applicable): No results found for: PREGTESTUR, PREGSERUM, HCG, HCGQUANT     ABGs:   Lab Results   Component Value Date/Time    PHART 7.28 06/12/2015 07:08 AM    PO2ART 365 06/12/2015 07:08 AM    HAP4UDB 44 06/12/2015 07:08 AM    NFX6RGB 20 06/12/2015 07:08 AM    BEART -6.0 06/12/2015 07:08 AM    H2WHNLEI 100 06/12/2015 07:08 AM        Type & Screen (If Applicable):  No results found for: LABABO, LABRH    Drug/Infectious Status (If Applicable):  No results found for: HIV, HEPCAB    COVID-19 Screening (If Applicable):   Lab Results   Component Value Date/Time    COVID19 NOT DETECTED 02/08/2023 06:00 AM    COVID19 NOT DETECTED 12/15/2020 08:48 PM    COVID19 DETECTED 11/24/2020 09:06 AM           Anesthesia Evaluation    Airway: Mallampati: II  TM distance: >3 FB   Neck ROM: full  Mouth opening: > = 3 FB   Dental:          Pulmonary:                              Cardiovascular:    (+) hypertension:, past MI:, CAD:,         Rhythm: regular  Rate: normal                    Neuro/Psych:               GI/Hepatic/Renal:             Endo/Other:                     Abdominal:             Vascular: Other Findings:           Anesthesia Plan      general     ASA 3       Induction: intravenous. Anesthetic plan and risks discussed with patient. Plan discussed with CRNA.                     Amee Coleman MD   2/10/2023

## 2023-02-10 NOTE — PROGRESS NOTES
[address] [place] [address]  GI Progress Note          Joao Patricia is a 68 y.o. male patient. 1. LFT elevation    2. Abdominal pain, epigastric    3.  Nausea and vomiting, unspecified vomiting type        Admit Date: 2/7/2023    Subjective:       Alert had some confusion overnight, no abd pain N/V    Scheduled Meds:   melatonin  3 mg Oral Nightly    tamsulosin  0.4 mg Oral Daily    [Held by provider] aspirin  81 mg Oral Daily    sodium chloride flush  5-40 mL IntraVENous 2 times per day    enoxaparin  40 mg SubCUTAneous Daily    piperacillin-tazobactam  3,375 mg IntraVENous q8h    pantoprazole  40 mg IntraVENous Daily           Objective:       Patient Vitals for the past 24 hrs:   BP Temp Temp src Pulse Resp SpO2 Weight   02/10/23 0344 (!) 153/48 98.2 °F (36.8 °C) Axillary 77 16 98 % 184 lb 11.9 oz (83.8 kg)   02/10/23 0002 (!) 126/59 98.2 °F (36.8 °C) Axillary 90 14 93 % --   02/09/23 2322 -- -- -- -- 16 -- --   02/09/23 2252 -- -- -- -- 20 -- --   02/09/23 1931 (!) 161/68 99.2 °F (37.3 °C) Axillary 93 18 94 % --   02/09/23 1626 (!) 163/81 99.5 °F (37.5 °C) Oral 87 17 97 % --   02/09/23 1545 139/75 98.5 °F (36.9 °C) Temporal 96 16 99 % --   02/09/23 1530 139/78 98.5 °F (36.9 °C) Temporal 94 18 98 % --   02/09/23 1515 (!) 147/72 -- -- 93 17 93 % --   02/09/23 1500 (!) 153/78 -- -- 95 17 93 % --   02/09/23 1455 (!) 137/101 -- -- 99 17 94 % --   02/09/23 1450 (!) 151/91 -- -- (!) 104 14 96 % --   02/09/23 1445 (!) 175/117 -- -- (!) 114 13 98 % --   02/09/23 1443 (!) 174/77 98.4 °F (36.9 °C) Temporal (!) 115 13 100 % --   02/09/23 1337 (!) 154/75 98.6 °F (37 °C) Temporal 87 16 95 % --   02/09/23 1202 (!) 150/69 98.6 °F (37 °C) Oral 76 18 94 % --   02/09/23 0821 -- -- -- 80 -- -- --   02/09/23 0818 137/67 98.5 °F (36.9 °C) Oral 80 18 95 % --       Exam:  VITALS:  BP (!) 153/48   Pulse 77   Temp 98.2 °F (36.8 °C) (Axillary)   Resp 16   Ht 6' 1\" (1.854 m)   Wt 184 lb 11.9 oz (83.8 kg)   SpO2 98%   BMI 24.37 kg/m²   TEMPERATURE:  Current - Temp: 98.2 °F (36.8 °C); Max - Temp  Av.6 °F (37 °C)  Min: 98.2 °F (36.8 °C)  Max: 99.5 °F (37.5 °C)    NAD  General appearance: alert, appears stated age, cooperative and no distress  Abdomen: soft, non-tender; bowel sounds normal; no masses,  no organomegaly  AAOx3, No asterixis or encephalopathy  Extremities: No edema. Recent Labs     23  0558 23  0825 02/10/23  0343   WBC 14.5* 8.9 6.9   HGB 12.4* 12.3* 11.8*   HCT 36.8* 37.5* 35.6*   MCV 93.3 94.9 95.3    185 169     Recent Labs     23  0558 23  0800 23  0830 02/10/23  0343     --  138 142   K 4.1  --  4.3 4.4   CL 96*  --  102 108   CO2 23  --   23   PHOS  --  2.9  --  4.0   BUN 20  --  18 15   CREATININE 0.8  --  0.8 0.9     Recent Labs     23  0558 23  1454 23  0830 02/10/23  0343   *  --  110* 68*   *  --  156* 110*   BILIDIR  --  <0.2 <0.2 <0.2   BILITOT 0.9 1.0 0.9  0.9 0.5   ALKPHOS 233*  --  173* 143*     Recent Labs     23  2359 23  0914   LIPASE 33.0 15.0       Assessment:       Principal Problem:    Elevated liver enzymes  Active Problems:    Sepsis without acute organ dysfunction (HCC)    Abdominal pain, epigastric    LFT elevation    Lactic acidosis    Nausea and vomiting    Ascending cholangitis  Resolved Problems:    * No resolved hospital problems. *      No signs of cholangitis, liver chem improving, no F/C WBC normal  Possible choly per surgery  ?  GB source of E coli sepsis  Biliary drainage established    Recommendations:       Finish course of antibiotics  Follow liver chem until normal  Iron studies    Rishi Catherine MD  2/10/2023  7:01 AM

## 2023-02-11 VITALS
OXYGEN SATURATION: 95 % | HEART RATE: 66 BPM | HEIGHT: 73 IN | TEMPERATURE: 98.3 F | WEIGHT: 187.83 LBS | SYSTOLIC BLOOD PRESSURE: 165 MMHG | DIASTOLIC BLOOD PRESSURE: 88 MMHG | BODY MASS INDEX: 24.89 KG/M2 | RESPIRATION RATE: 18 BRPM

## 2023-02-11 LAB
ALBUMIN SERPL-MCNC: 2.8 G/DL (ref 3.4–5)
ALP BLD-CCNC: 140 U/L (ref 40–129)
ALT SERPL-CCNC: 82 U/L (ref 10–40)
ANION GAP SERPL CALCULATED.3IONS-SCNC: 6 MMOL/L (ref 3–16)
AST SERPL-CCNC: 46 U/L (ref 15–37)
BASOPHILS ABSOLUTE: 0 K/UL (ref 0–0.2)
BASOPHILS RELATIVE PERCENT: 0.7 %
BILIRUB SERPL-MCNC: 0.4 MG/DL (ref 0–1)
BILIRUBIN DIRECT: <0.2 MG/DL (ref 0–0.3)
BILIRUBIN, INDIRECT: ABNORMAL MG/DL (ref 0–1)
BLOOD CULTURE, ROUTINE: ABNORMAL
BUN BLDV-MCNC: 13 MG/DL (ref 7–20)
CALCIUM SERPL-MCNC: 8.1 MG/DL (ref 8.3–10.6)
CHLORIDE BLD-SCNC: 110 MMOL/L (ref 99–110)
CO2: 24 MMOL/L (ref 21–32)
CREAT SERPL-MCNC: 0.7 MG/DL (ref 0.8–1.3)
CULTURE, BLOOD 2: ABNORMAL
CULTURE, BLOOD 2: ABNORMAL
EOSINOPHILS ABSOLUTE: 0 K/UL (ref 0–0.6)
EOSINOPHILS RELATIVE PERCENT: 0.3 %
GFR SERPL CREATININE-BSD FRML MDRD: >60 ML/MIN/{1.73_M2}
GLUCOSE BLD-MCNC: 123 MG/DL (ref 70–99)
HCT VFR BLD CALC: 34.8 % (ref 40.5–52.5)
HEMOGLOBIN: 11.8 G/DL (ref 13.5–17.5)
LYMPHOCYTES ABSOLUTE: 0.8 K/UL (ref 1–5.1)
LYMPHOCYTES RELATIVE PERCENT: 17.8 %
MAGNESIUM: 1.7 MG/DL (ref 1.8–2.4)
MCH RBC QN AUTO: 31.6 PG (ref 26–34)
MCHC RBC AUTO-ENTMCNC: 33.8 G/DL (ref 31–36)
MCV RBC AUTO: 93.4 FL (ref 80–100)
MONOCYTES ABSOLUTE: 0.5 K/UL (ref 0–1.3)
MONOCYTES RELATIVE PERCENT: 10.5 %
NEUTROPHILS ABSOLUTE: 3.1 K/UL (ref 1.7–7.7)
NEUTROPHILS RELATIVE PERCENT: 70.7 %
ORGANISM: ABNORMAL
PDW BLD-RTO: 13.8 % (ref 12.4–15.4)
PHOSPHORUS: 2.6 MG/DL (ref 2.5–4.9)
PLATELET # BLD: 177 K/UL (ref 135–450)
PMV BLD AUTO: 8.2 FL (ref 5–10.5)
POTASSIUM SERPL-SCNC: 4.1 MMOL/L (ref 3.5–5.1)
RBC # BLD: 3.73 M/UL (ref 4.2–5.9)
SODIUM BLD-SCNC: 140 MMOL/L (ref 136–145)
TOTAL PROTEIN: 5 G/DL (ref 6.4–8.2)
WBC # BLD: 4.4 K/UL (ref 4–11)

## 2023-02-11 PROCEDURE — 6370000000 HC RX 637 (ALT 250 FOR IP)

## 2023-02-11 PROCEDURE — 83735 ASSAY OF MAGNESIUM: CPT

## 2023-02-11 PROCEDURE — 2580000003 HC RX 258: Performed by: FAMILY MEDICINE

## 2023-02-11 PROCEDURE — 6360000002 HC RX W HCPCS: Performed by: STUDENT IN AN ORGANIZED HEALTH CARE EDUCATION/TRAINING PROGRAM

## 2023-02-11 PROCEDURE — C9113 INJ PANTOPRAZOLE SODIUM, VIA: HCPCS | Performed by: STUDENT IN AN ORGANIZED HEALTH CARE EDUCATION/TRAINING PROGRAM

## 2023-02-11 PROCEDURE — 80076 HEPATIC FUNCTION PANEL: CPT

## 2023-02-11 PROCEDURE — 6370000000 HC RX 637 (ALT 250 FOR IP): Performed by: STUDENT IN AN ORGANIZED HEALTH CARE EDUCATION/TRAINING PROGRAM

## 2023-02-11 PROCEDURE — 85025 COMPLETE CBC W/AUTO DIFF WBC: CPT

## 2023-02-11 PROCEDURE — 2580000003 HC RX 258: Performed by: STUDENT IN AN ORGANIZED HEALTH CARE EDUCATION/TRAINING PROGRAM

## 2023-02-11 PROCEDURE — 99239 HOSP IP/OBS DSCHRG MGMT >30: CPT | Performed by: HOSPITALIST

## 2023-02-11 PROCEDURE — 6360000002 HC RX W HCPCS

## 2023-02-11 PROCEDURE — 36415 COLL VENOUS BLD VENIPUNCTURE: CPT

## 2023-02-11 PROCEDURE — 80069 RENAL FUNCTION PANEL: CPT

## 2023-02-11 PROCEDURE — 99232 SBSQ HOSP IP/OBS MODERATE 35: CPT | Performed by: SURGERY

## 2023-02-11 RX ORDER — TAMSULOSIN HYDROCHLORIDE 0.4 MG/1
0.4 CAPSULE ORAL DAILY
Qty: 30 CAPSULE | Refills: 0 | Status: SHIPPED | OUTPATIENT
Start: 2023-02-11

## 2023-02-11 RX ORDER — AMOXICILLIN AND CLAVULANATE POTASSIUM 875; 125 MG/1; MG/1
1 TABLET, FILM COATED ORAL 2 TIMES DAILY
Qty: 14 TABLET | Refills: 0 | Status: SHIPPED | OUTPATIENT
Start: 2023-02-11 | End: 2023-02-14

## 2023-02-11 RX ORDER — MAGNESIUM SULFATE IN WATER 40 MG/ML
4000 INJECTION, SOLUTION INTRAVENOUS ONCE
Status: COMPLETED | OUTPATIENT
Start: 2023-02-11 | End: 2023-02-11

## 2023-02-11 RX ADMIN — ENOXAPARIN SODIUM 40 MG: 100 INJECTION SUBCUTANEOUS at 10:30

## 2023-02-11 RX ADMIN — ASPIRIN 81 MG: 81 TABLET, COATED ORAL at 10:18

## 2023-02-11 RX ADMIN — DIBASIC SODIUM PHOSPHATE, MONOBASIC POTASSIUM PHOSPHATE AND MONOBASIC SODIUM PHOSPHATE 2 TABLET: 852; 155; 130 TABLET ORAL at 10:18

## 2023-02-11 RX ADMIN — TAMSULOSIN HYDROCHLORIDE 0.4 MG: 0.4 CAPSULE ORAL at 10:18

## 2023-02-11 RX ADMIN — SODIUM CHLORIDE: 9 INJECTION, SOLUTION INTRAVENOUS at 05:23

## 2023-02-11 RX ADMIN — SODIUM CHLORIDE, PRESERVATIVE FREE 10 ML: 5 INJECTION INTRAVENOUS at 10:30

## 2023-02-11 RX ADMIN — MAGNESIUM SULFATE HEPTAHYDRATE 4000 MG: 40 INJECTION, SOLUTION INTRAVENOUS at 10:28

## 2023-02-11 RX ADMIN — PIPERACILLIN AND TAZOBACTAM 3375 MG: 3; .375 INJECTION, POWDER, LYOPHILIZED, FOR SOLUTION INTRAVENOUS at 10:19

## 2023-02-11 RX ADMIN — PANTOPRAZOLE SODIUM 40 MG: 40 INJECTION, POWDER, LYOPHILIZED, FOR SOLUTION INTRAVENOUS at 10:29

## 2023-02-11 RX ADMIN — SODIUM CHLORIDE 25 ML: 9 INJECTION, SOLUTION INTRAVENOUS at 02:00

## 2023-02-11 RX ADMIN — PIPERACILLIN AND TAZOBACTAM 3375 MG: 3; .375 INJECTION, POWDER, LYOPHILIZED, FOR SOLUTION INTRAVENOUS at 02:01

## 2023-02-11 ASSESSMENT — ENCOUNTER SYMPTOMS
GASTROINTESTINAL NEGATIVE: 1
EYES NEGATIVE: 1
RESPIRATORY NEGATIVE: 1

## 2023-02-11 NOTE — PROGRESS NOTES
Progress Note    Admit Date: 2/7/2023  Day: 4  Diet: ADULT DIET; Easy to Chew    CC: Abdominal pain    Interval history: Patient seen and examined this morning at bedside with Dr. Chi Rubin. He denies any abdominal pain, fever, chills, nausea, vomiting, diarrhea. Had a good night without any agitation. Will be discharged today      Medications:     Scheduled Meds:   sodium chloride flush  5-40 mL IntraVENous 2 times per day    melatonin  3 mg Oral Nightly    tamsulosin  0.4 mg Oral Daily    [Held by provider] aspirin  81 mg Oral Daily    sodium chloride flush  5-40 mL IntraVENous 2 times per day    enoxaparin  40 mg SubCUTAneous Daily    piperacillin-tazobactam  3,375 mg IntraVENous q8h    pantoprazole  40 mg IntraVENous Daily     Continuous Infusions:   sodium chloride 125 mL/hr at 02/11/23 0523    sodium chloride      sodium chloride 25 mL (02/11/23 0200)     PRN Meds:sodium chloride flush, sodium chloride, meperidine, fentanNYL, HYDROmorphone, ondansetron, prochlorperazine, LORazepam, diphenhydrAMINE, labetalol, HYDROmorphone, sodium chloride flush, sodium chloride, ondansetron **OR** ondansetron, polyethylene glycol, acetaminophen **OR** acetaminophen    Objective:   Vitals:   T-max:  Patient Vitals for the past 8 hrs:   BP Temp Temp src Pulse SpO2 Weight   02/11/23 0450 (!) 154/84 97.5 °F (36.4 °C) Oral 73 93 % 187 lb 13.3 oz (85.2 kg)         Intake/Output Summary (Last 24 hours) at 2/11/2023 0737  Last data filed at 2/11/2023 0450  Gross per 24 hour   Intake 400 ml   Output 875 ml   Net -475 ml         Review of Systems   Constitutional: Negative. HENT: Negative. Eyes: Negative. Respiratory: Negative. Cardiovascular: Negative. Gastrointestinal: Negative. Genitourinary: Negative. Musculoskeletal: Negative. Skin: Negative. Neurological: Negative. Psychiatric/Behavioral: Negative. Physical Exam  Vitals reviewed. Constitutional:       General: He is awake.       Appearance: Normal appearance. He is normal weight. Interventions: He is restrained. HENT:      Head: Normocephalic and atraumatic. Eyes:      Extraocular Movements: Extraocular movements intact. Conjunctiva/sclera: Conjunctivae normal.      Pupils: Pupils are equal, round, and reactive to light. Cardiovascular:      Rate and Rhythm: Normal rate and regular rhythm. Heart sounds: Normal heart sounds. Pulmonary:      Effort: Pulmonary effort is normal.      Breath sounds: Normal breath sounds. Abdominal:      General: Abdomen is flat. Bowel sounds are normal.      Palpations: Abdomen is soft. Musculoskeletal:         General: Normal range of motion. Skin:     General: Skin is warm and dry. Neurological:      General: No focal deficit present. Mental Status: He is alert and oriented to person, place, and time. Mental status is at baseline. Psychiatric:         Mood and Affect: Mood normal.         Behavior: Behavior normal. Behavior is cooperative.        LABS:    CBC:   Recent Labs     02/09/23  0825 02/10/23  0343 02/11/23  0510   WBC 8.9 6.9 4.4   HGB 12.3* 11.8* 11.8*   HCT 37.5* 35.6* 34.8*    169 177   MCV 94.9 95.3 93.4       Renal:    Recent Labs     02/09/23  0800 02/09/23  0830 02/10/23  0343 02/11/23  0510   NA  --  138 142 140   K  --  4.3 4.4 4.1   CL  --  102 108 110   CO2  --  23 23 24   BUN  --  18 15 13   CREATININE  --  0.8 0.9 0.7*   GLUCOSE  --  126* 113* 123*   CALCIUM  --  8.6 8.2* 8.1*   MG  --  2.00 1.90 1.70*   PHOS 2.9  --  4.0 2.6   ANIONGAP  --  13 11 6       Hepatic:   Recent Labs     02/09/23  0830 02/10/23  0343 02/11/23  0510   * 68* 46*   * 110* 82*   BILITOT 0.9  0.9 0.5 0.4   BILIDIR <0.2 <0.2 <0.2   PROT 5.6* 5.0* 5.0*   LABALBU 3.2* 3.3* 2.8*   ALKPHOS 173* 143* 140*       INR:   Recent Labs     02/10/23  0343   INR 1.12       Lactate: No results for input(s): LACTATE in the last 72 hours.  Cultures:  -----------------------------------------------------------------  RAD:   NM HEPATOBILIARY   Final Result      Unremarkable HIDA scan with no evidence of obstruction of cystic duct or common bile duct. FL ERCP BILIARY S&I   Final Result      Intraoperative fluoroscopy during ERCP. For additional details please see the medical record for a procedural note by the performing physician. MRI ABDOMEN W WO CONTRAST MRCP   Final Result      1. Mild distention of the gallbladder with significant wall thickening. Small amount of pericholecystic fluid is nonspecific. Possible tiny gallstone at the neck is favored to be artifactual. Otherwise no gallstones demonstrated. 2.  Dilation of the common bile duct measuring up to 12 mm with mild diffuse intrahepatic duct dilation. This is not specific. No focal stricture, choledocholithiasis, or obstructing mass evident. 3.  Multiple duodenal diverticula including duodenal diverticula at the ampulla. No significant compression on the adjacent distal common bile duct and pancreatic ducts demonstrated. 4.  Benign cyst in the interpolar right kidney measuring 4.6 cm.   5.  Nonspecific tiny cystic lesions in the pancreas. Largest measures 6 mm at the inferior pancreatic body. These could reflect side branch IPMN. Based on current guidelines, recommend follow-up abdominal MRI in 2 years to evaluate stability. US ABDOMEN LIMITED   Final Result   1. Nonspecific gallbladder wall thickening without shadowing calculus or pain over the gallbladder. Cholecystitis is considered unlikely but cannot be entirely excluded. 2. Dilated common bile duct without definite obstructing abnormality. Consider further evaluation with MRCP or ERCP. 3. Equivocal edema along the pancreas which appeared normal on CT of the abdomen from earlier today. XR CHEST PORTABLE   Final Result   1. Mild left lower lobe atelectasis or infiltrate.       CT ABDOMEN PELVIS W IV CONTRAST Additional Contrast? Oral   Final Result      No acute abdominopelvic abnormality. INCIDENTAL FINDINGS: None. Assessment/Plan:   Abdominal pain   Elevated liver enzymes  Ecoli bacteremia   Sepsis   CT abdomen and pelvis only revealing mild periportal edema  Patient with no leukocytosis and jaundice however during ED stay he became altered with an episode of hypotension and febrile. Moderate elevation in AST and ALT 5x ULN. He likely had a gallstone which already passed indicated by elevated alk phos but normal bilirubin. - fluid resucitated  - Acute hepatitis panel nonreactive  - S/p RUQ US with dilated CBD. MRCP with mild distention of GB and confirming dilation of CBD. ERCP on 2/9 showed dilated CBD w/o evidence of stone or purulent material  - 2/2 Blood Cx +ve for Ecoli  - Continue Zosyn. Will give another 3 days of Augmentin at discharge   - S/p unremarkable HIDA scan on 2/10  - General surgery following and recs appreciated. No plans for lap lia at this time    Lactic acidosis-Resolved  Patient received a 1 L LR bolus in the ED.  -LA 2.3->4.4->2.2->2.0     Hx of Hypertension  Pt now hypotensive  -Holding home antiHTN    Code Status: Full code  FEN: N.p.o.  PPX: Lovenox  DISPO: [facility]    Tanner Duran MD PGY-3  02/11/23  7:37 AM    This patient has been staffed and discussed with Rosalia Goel MD.     Addendum to Resident H& P/Progress note:  I have personally seen,examined and evaluated the patient. I have reviewed the current history, physical findings, labs and assessment and plan and agree with note as documented by resident MD ( Servando Arenas)  Will d/c the patient home. Recommendations of GI and general surgery teams were reviewed.     Rosalia Goel MD, FACP

## 2023-02-11 NOTE — PLAN OF CARE
Problem: Discharge Planning  Goal: Discharge to home or other facility with appropriate resources  2/11/2023 1340 by Joy Herman RN  Outcome: Progressing  Flowsheets (Taken 2/11/2023 1200)  Discharge to home or other facility with appropriate resources: Identify barriers to discharge with patient and caregiver  2/11/2023 0644 by Piyush Londono RN  Outcome: Progressing  Flowsheets (Taken 2/11/2023 7045)  Discharge to home or other facility with appropriate resources: Identify barriers to discharge with patient and caregiver     Problem: Pain  Goal: Verbalizes/displays adequate comfort level or baseline comfort level  2/11/2023 1340 by Joy Herman RN  Outcome: Progressing  Flowsheets (Taken 2/11/2023 0811)  Verbalizes/displays adequate comfort level or baseline comfort level:   Assess pain using appropriate pain scale   Encourage patient to monitor pain and request assistance  2/11/2023 0644 by Piyuhs Londono RN  Outcome: Progressing  Flowsheets (Taken 2/11/2023 0644)  Verbalizes/displays adequate comfort level or baseline comfort level:   Encourage patient to monitor pain and request assistance   Assess pain using appropriate pain scale     Problem: Confusion  Goal: Confusion, delirium, dementia, or psychosis is improved or at baseline  Description: INTERVENTIONS:  1. Assess for possible contributors to thought disturbance, including medications, impaired vision or hearing, underlying metabolic abnormalities, dehydration, psychiatric diagnoses, and notify attending LIP  2. [place] high risk fall precautions, as indicated  3. Provide frequent short contacts to provide reality reorientation, refocusing and direction  4. Decrease environmental stimuli, including noise as appropriate  5. Monitor and intervene to maintain adequate nutrition, hydration, elimination, sleep and activity  6.  If unable to ensure safety without constant attention obtain sitter and review sitter guidelines with assigned personnel  7. Initiate Psychosocial CNS and Spiritual Care consult, as indicated  2/11/2023 1340 by Yoel Dickens RN  Outcome: Progressing  Flowsheets (Taken 2/11/2023 1885)  Effect of thought disturbance (confusion, delirium, dementia, or psychosis) are managed with adequate functional status: Assess for contributors to thought disturbance, including medications, impaired vision or hearing, underlying metabolic abnormalities, dehydration, psychiatric diagnoses, notify LIP  2/11/2023 0644 by Jl Coulter RN  Outcome: Progressing  Flowsheets (Taken 2/11/2023 2587)  Effect of thought disturbance (confusion, delirium, dementia, or psychosis) are managed with adequate functional status:   Assess for contributors to thought disturbance, including medications, impaired vision or hearing, underlying metabolic abnormalities, dehydration, psychiatric diagnoses, notify [facility] high risk fall precautions, as indicated   Provide frequent short contacts to provide reality reorientation, refocusing and direction   Decrease environmental stimuli, including noise as appropriate   Monitor and intervene to maintain adequate nutrition, hydration, elimination, sleep and activity  Note: On video monitor. Fall precautions placed. Problem: Safety - Adult  Goal: Free from fall injury  2/11/2023 1340 by Yoel Dickens RN  Outcome: Progressing  2/11/2023 0644 by Jl Coulter RN  Outcome: Progressing  Note: Pt is a Fall Risk. See Darya Sharper Fall Risk Score. Pt bed in low position and side rails up. Call light and belongings in reach. Pt encouraged to call for assistance. Will continue with hourly rounds for PO intake, pain needs, toileting, and repositioning as needed. Fall precautions placed.       Problem: Risk for Elopement  Goal: Patient will not exit the unit/facility without proper excort  2/11/2023 1340 by Yoel Dickens RN  Outcome: Progressing  2/11/2023 0644 by Jl Coulter RN  Outcome: Progressing  Flowsheets (Taken 2/11/2023 1951)  Nursing Interventions for Elopement Risk: Assist with personal care needs such as toileting, eating, dressing, as needed to reduce the risk of wandering     Problem: Neurosensory - Adult  Goal: Achieves stable or improved neurological status  2/11/2023 1340 by Bandar Bhatt RN  Outcome: Progressing  Flowsheets (Taken 2/11/2023 6193)  Achieves stable or improved neurological status: Assess for and report changes in neurological status  2/11/2023 0644 by Heladio Dominique RN  Outcome: Progressing  Flowsheets (Taken 2/11/2023 0644)  Achieves stable or improved neurological status: Assess for and report changes in neurological status     Problem: Cardiovascular - Adult  Goal: Maintains optimal cardiac output and hemodynamic stability  2/11/2023 1340 by Bandar Bhatt RN  Outcome: Progressing  Flowsheets (Taken 2/11/2023 4004)  Maintains optimal cardiac output and hemodynamic stability: Monitor blood pressure and heart rate  2/11/2023 0644 by Heladio Dominique RN  Outcome: Progressing  Flowsheets (Taken 2/11/2023 1507)  Maintains optimal cardiac output and hemodynamic stability:   Monitor blood pressure and heart rate   Monitor urine output and notify Licensed Independent Practitioner for values outside of normal range   Assess for signs of decreased cardiac output     Problem: Gastrointestinal - Adult  Goal: Minimal or absence of nausea and vomiting  2/11/2023 1340 by Bandar Bhatt RN  Outcome: Progressing  Flowsheets (Taken 2/11/2023 3238)  Minimal or absence of nausea and vomiting: Administer IV fluids as ordered to ensure adequate hydration  2/11/2023 0644 by Heladio Dominique RN  Outcome: Progressing  Flowsheets (Taken 2/11/2023 0644)  Minimal or absence of nausea and vomiting:   Administer IV fluids as ordered to ensure adequate hydration   Provide nonpharmacologic comfort measures as appropriate     Problem: Genitourinary - Adult  Goal: Absence of urinary retention  2/11/2023 1340 by Trixie Soto RN  Outcome: Progressing  Flowsheets (Taken 2/11/2023 1748)  Absence of urinary retention: Assess patients ability to void and empty bladder  2/11/2023 0644 by Chelsea Valentino RN  Outcome: Progressing  Flowsheets (Taken 2/11/2023 9146)  Absence of urinary retention:   Assess patients ability to void and empty bladder   Monitor intake/output and perform bladder scan as needed     Problem: Safety - Medical Restraint  Goal: Remains free of injury from restraints (Restraint for Interference with Medical Device)  Description: INTERVENTIONS:  1. Determine that other, less restrictive measures have been tried or would not be effective before applying the restraint  2. Evaluate the patient's condition at the time of restraint application  3. Inform patient/family regarding the reason for restraint  4.  Q2H: Monitor safety, psychosocial status, comfort, nutrition and hydration  Outcome: Progressing     Problem: ABCDS Injury Assessment  Goal: Absence of physical injury  Outcome: Progressing

## 2023-02-11 NOTE — PLAN OF CARE
Problem: Discharge Planning  Goal: Discharge to home or other facility with appropriate resources  Outcome: Progressing  Flowsheets (Taken 2/11/2023 5228)  Discharge to home or other facility with appropriate resources: Identify barriers to discharge with patient and caregiver     Problem: Pain  Goal: Verbalizes/displays adequate comfort level or baseline comfort level  Outcome: Progressing  Flowsheets (Taken 2/11/2023 0644)  Verbalizes/displays adequate comfort level or baseline comfort level:   Encourage patient to monitor pain and request assistance   Assess pain using appropriate pain scale     Problem: Confusion  Goal: Confusion, delirium, dementia, or psychosis is improved or at baseline  Description: INTERVENTIONS:  1. Assess for possible contributors to thought disturbance, including medications, impaired vision or hearing, underlying metabolic abnormalities, dehydration, psychiatric diagnoses, and notify attending LIP  2. [place] high risk fall precautions, as indicated  3. Provide frequent short contacts to provide reality reorientation, refocusing and direction  4. Decrease environmental stimuli, including noise as appropriate  5. Monitor and intervene to maintain adequate nutrition, hydration, elimination, sleep and activity  6. If unable to ensure safety without constant attention obtain sitter and review sitter guidelines with assigned personnel  7.  Initiate Psychosocial CNS and Spiritual Care consult, as indicated  Outcome: Progressing  Flowsheets (Taken 2/11/2023 3334)  Effect of thought disturbance (confusion, delirium, dementia, or psychosis) are managed with adequate functional status:   Assess for contributors to thought disturbance, including medications, impaired vision or hearing, underlying metabolic abnormalities, dehydration, psychiatric diagnoses, notify Arnie Choudhury high risk fall precautions, as indicated   Provide frequent short contacts to provide reality reorientation, refocusing and direction   Decrease environmental stimuli, including noise as appropriate   Monitor and intervene to maintain adequate nutrition, hydration, elimination, sleep and activity  Note: On video monitor. Fall precautions placed. Problem: Safety - Adult  Goal: Free from fall injury  Outcome: Progressing  Note: Pt is a Fall Risk. See Zoë Sims Fall Risk Score. Pt bed in low position and side rails up. Call light and belongings in reach. Pt encouraged to call for assistance. Will continue with hourly rounds for PO intake, pain needs, toileting, and repositioning as needed. Fall precautions placed.       Problem: Risk for Elopement  Goal: Patient will not exit the unit/facility without proper excort  Outcome: Progressing  Flowsheets (Taken 2/11/2023 0644)  Nursing Interventions for Elopement Risk: Assist with personal care needs such as toileting, eating, dressing, as needed to reduce the risk of wandering     Problem: Neurosensory - Adult  Goal: Achieves stable or improved neurological status  Outcome: Progressing  Flowsheets (Taken 2/11/2023 0644)  Achieves stable or improved neurological status: Assess for and report changes in neurological status     Problem: Cardiovascular - Adult  Goal: Maintains optimal cardiac output and hemodynamic stability  Outcome: Progressing  Flowsheets (Taken 2/11/2023 0644)  Maintains optimal cardiac output and hemodynamic stability:   Monitor blood pressure and heart rate   Monitor urine output and notify Licensed Independent Practitioner for values outside of normal range   Assess for signs of decreased cardiac output     Problem: Gastrointestinal - Adult  Goal: Minimal or absence of nausea and vomiting  Outcome: Progressing  Flowsheets (Taken 2/11/2023 0644)  Minimal or absence of nausea and vomiting:   Administer IV fluids as ordered to ensure adequate hydration   Provide nonpharmacologic comfort measures as appropriate     Problem: Genitourinary - Adult  Goal: Absence of urinary retention  Outcome: Progressing  Flowsheets (Taken 2/11/2023 0054)  Absence of urinary retention:   Assess patients ability to void and empty bladder   Monitor intake/output and perform bladder scan as needed

## 2023-02-11 NOTE — CARE COORDINATION
Case Management Assessment            Discharge Note                    Date / Time of Note: 2/11/2023 9:49 AM                  Discharge Note Completed by: ROSS Campbell, RASHELW    Patient Name: Daniela Farooq   YOB: 1945  Diagnosis: Abdominal pain, epigastric [R10.13]  Elevated liver enzymes [R74.8]  LFT elevation [R79.89]  Nausea and vomiting, unspecified vomiting type [R11.2]   Date / Time: 2/7/2023 10:53 PM    Current PCP: Gala Romero MD  Clinic patient: No    Hospitalization in the last 30 days: No    Advance Directives:  Code Status: Full Code  [place] DNR form completed and on chart: Yes    Financial:  Payor: Carolyne Monzon / Plan: [facility] HMO / Product Type: *No Product type* /      Pharmacy:    [facility], [address] [phone] - F [phone] [address] [facility] [address]  Phone: [phone] Fax: [phone]    [facility] [address] [phone] - f [phone]  [facility] [address]  Phone: [phone] Fax: [phone]    [address] [facility] Mail Delivery - [facility], [place] [address]  [address] [facility] [address]  Phone: [phone] Fax: [phone]      Assistance purchasing medications?:    Assistance provided by Case Management: None at this time    Does patient want to participate in local refill/ meds to beds program?: Yes    Meds To Beds General Rules:  1. Can ONLY be done Monday- Friday between 8:30am-5pm  2. Prescription(s) must be in pharmacy by 3pm to be filled same day  3. Copy of patient's insurance/ prescription drug card and patient face sheet must be sent along with the prescription(s)  4. Cost of Rx cannot be added to hospital bill. If financial assistance is needed, please contact unit  or ;   or  CANNOT provide pharmacy voucher for patients co-pays  5. Patients can then  the prescription on their way out of the hospital at discharge, or pharmacy can deliver to the bedside if staff is available. (payment due at time of pick-up or delivery - cash, check, or card accepted)     Able to afford home medications/ co-pay costs: Yes    ADLS:  Current PT AM-PAC Score:   /24  Current OT AM-PAC Score:   /24      DISCHARGE Disposition: Home- No Services Needed    LOC at discharge: Not Applicable  DANIELLE Completed: Not Indicated    Notification completed in HENS/PAS?:  Not Applicable    IMM Completed:   Not Indicated    Transportation:  Transportation PLAN for discharge: family   Mode of Transport: [address]:  [address] ordered at discharge: No    Home Oxygen and Respiratory Equipment:  Oxygen needed at discharge?: No    Dialysis:  Dialysis patient: No    Referrals made at [facility] for outpatient continued care:  Not Applicable    Additional CM Notes:  Pt will DC home today. Family at bedside, will transport home. They denied any HHC or DME needs. No other needs at this time. The Plan for Transition of Care is related to the following treatment goals of Abdominal pain, epigastric [R10.13]  Elevated liver enzymes [R74.8]  LFT elevation [R79.89]  Nausea and vomiting, unspecified vomiting type [R11.2]    The Patient and/or patient representative Michaelle Harada and his family were provided with a choice of provider and agrees with the discharge plan Yes    Freedom of choice list was provided with basic dialogue that supports the patient's individualized plan of care/goals and shares the quality data associated with the providers.  Yes    Care Transitions patient: Yes    ROSS [place], [facility]  Case Management Department  Ph: [phone]  Fax: [phone]

## 2023-02-11 NOTE — DISCHARGE INSTRUCTIONS
Complete antibiotics. Schedule follow-up appointments listed above. Return to Emergency Department if symptoms worsen.

## 2023-02-11 NOTE — PROGRESS NOTES
[address] [place] [address]  GI Progress Note          Henok Red is a 68 y.o. male patient. 1. LFT elevation    2. Abdominal pain, epigastric    3. Nausea and vomiting, unspecified vomiting type        Admit Date: 2023    Subjective:       Doing much better, no abd pain, more alert    Scheduled Meds:   phosphorus  500 mg Oral BID    magnesium sulfate  4,000 mg IntraVENous Once    sodium chloride flush  5-40 mL IntraVENous 2 times per day    melatonin  3 mg Oral Nightly    tamsulosin  0.4 mg Oral Daily    aspirin  81 mg Oral Daily    sodium chloride flush  5-40 mL IntraVENous 2 times per day    enoxaparin  40 mg SubCUTAneous Daily    piperacillin-tazobactam  3,375 mg IntraVENous q8h    pantoprazole  40 mg IntraVENous Daily     Continuous Infusions:   sodium chloride      sodium chloride 25 mL (23 0200)     Objective:       Patient Vitals for the past 24 hrs:   BP Temp Temp src Pulse Resp SpO2 Weight   23 0450 (!) 154/84 97.5 °F (36.4 °C) Oral 73 -- 93 % 187 lb 13.3 oz (85.2 kg)   02/10/23 2323 (!) 143/74 97.9 °F (36.6 °C) Oral 88 -- 93 % --   02/10/23 2045 (!) 149/79 97.4 °F (36.3 °C) Oral 65 -- 94 % --   02/10/23 1742 -- -- -- 74 -- -- --   02/10/23 1642 (!) 157/74 98.1 °F (36.7 °C) Oral 69 17 97 % --   02/10/23 1444 -- -- -- 73 -- -- --   02/10/23 1300 -- -- -- 66 -- -- --   02/10/23 1118 (!) 154/80 98.5 °F (36.9 °C) Oral 60 12 99 % --   02/10/23 1000 -- -- -- 64 -- -- --       Exam:  VITALS:  BP (!) 154/84   Pulse 73   Temp 97.5 °F (36.4 °C) (Oral)   Resp 17   Ht 6' 1\" (1.854 m)   Wt 187 lb 13.3 oz (85.2 kg)   SpO2 93%   BMI 24.78 kg/m²   TEMPERATURE:  Current - Temp: 97.5 °F (36.4 °C);  Max - Temp  Av.9 °F (36.6 °C)  Min: 97.4 °F (36.3 °C)  Max: 98.5 °F (36.9 °C)    NAD  General appearance: alert, appears stated age, cooperative and no distress  Abdomen: soft, non-tender; bowel sounds normal; no masses,  no organomegaly  AAOx3, No asterixis or encephalopathy  Extremities: No edema. Recent Labs     02/09/23  0825 02/10/23  0343 02/11/23  0510   WBC 8.9 6.9 4.4   HGB 12.3* 11.8* 11.8*   HCT 37.5* 35.6* 34.8*   MCV 94.9 95.3 93.4    169 177     Recent Labs     02/09/23  0800 02/09/23  0830 02/10/23  0343 02/11/23  0510   NA  --  138 142 140   K  --  4.3 4.4 4.1   CL  --  102 108 110   CO2  --  23 23 24   PHOS 2.9  --  4.0 2.6   BUN  --  18 15 13   CREATININE  --  0.8 0.9 0.7*     Recent Labs     02/09/23  0830 02/10/23  0343 02/11/23  0510   * 68* 46*   * 110* 82*   BILIDIR <0.2 <0.2 <0.2   BILITOT 0.9  0.9 0.5 0.4   ALKPHOS 173* 143* 140*     Recent Labs     02/09/23  0830 02/10/23  0343 02/11/23  0510   PROT 5.6* 5.0* 5.0*   INR  --  1.12  --      Assessment:       Principal Problem:    Elevated liver enzymes  Active Problems:    Sepsis without acute organ dysfunction (HCC)    Abdominal pain, epigastric    LFT elevation    Lactic acidosis    Nausea and vomiting    Ascending cholangitis    E coli bacteremia    Acute cholecystitis  Resolved Problems:    * No resolved hospital problems. *    Presume biliary infection not definitely diagnosed, is improving, liver chem much better, ?  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

## 2023-02-11 NOTE — PLAN OF CARE
Problem: Discharge Planning  Goal: Discharge to home or other facility with appropriate resources  2/11/2023 1400 by Flower Haynes RN  Outcome: Adequate for Discharge  2/11/2023 1340 by Flower Haynes RN  Outcome: Progressing  Flowsheets (Taken 2/11/2023 9023)  Discharge to home or other facility with appropriate resources: Identify barriers to discharge with patient and caregiver  2/11/2023 0644 by Pepe Olivas RN  Outcome: Progressing  Flowsheets (Taken 2/11/2023 5434)  Discharge to home or other facility with appropriate resources: Identify barriers to discharge with patient and caregiver     Problem: Pain  Goal: Verbalizes/displays adequate comfort level or baseline comfort level  2/11/2023 1400 by Flower Haynes RN  Outcome: Adequate for Discharge  2/11/2023 1340 by Flower Haynes RN  Outcome: Progressing  Flowsheets (Taken 2/11/2023 9160)  Verbalizes/displays adequate comfort level or baseline comfort level:   Assess pain using appropriate pain scale   Encourage patient to monitor pain and request assistance  2/11/2023 0644 by Pepe Olivas RN  Outcome: Progressing  Flowsheets (Taken 2/11/2023 0644)  Verbalizes/displays adequate comfort level or baseline comfort level:   Encourage patient to monitor pain and request assistance   Assess pain using appropriate pain scale     Problem: Confusion  Goal: Confusion, delirium, dementia, or psychosis is improved or at baseline  Description: INTERVENTIONS:  1. Assess for possible contributors to thought disturbance, including medications, impaired vision or hearing, underlying metabolic abnormalities, dehydration, psychiatric diagnoses, and notify attending LIP  2. [place] high risk fall precautions, as indicated  3. Provide frequent short contacts to provide reality reorientation, refocusing and direction  4. Decrease environmental stimuli, including noise as appropriate  5.  Monitor and intervene to maintain adequate nutrition, hydration, elimination, sleep and activity  6. If unable to ensure safety without constant attention obtain sitter and review sitter guidelines with assigned personnel  7. Initiate Psychosocial CNS and Spiritual Care consult, as indicated  2/11/2023 1400 by Sonja Robb RN  Outcome: Adequate for Discharge  2/11/2023 1340 by Sonja Robb RN  Outcome: Progressing  Flowsheets (Taken 2/11/2023 8339)  Effect of thought disturbance (confusion, delirium, dementia, or psychosis) are managed with adequate functional status: Assess for contributors to thought disturbance, including medications, impaired vision or hearing, underlying metabolic abnormalities, dehydration, psychiatric diagnoses, notify LIP  2/11/2023 0644 by Olayinka Bruno RN  Outcome: Progressing  Flowsheets (Taken 2/11/2023 6692)  Effect of thought disturbance (confusion, delirium, dementia, or psychosis) are managed with adequate functional status:   Assess for contributors to thought disturbance, including medications, impaired vision or hearing, underlying metabolic abnormalities, dehydration, psychiatric diagnoses, notify [facility] high risk fall precautions, as indicated   Provide frequent short contacts to provide reality reorientation, refocusing and direction   Decrease environmental stimuli, including noise as appropriate   Monitor and intervene to maintain adequate nutrition, hydration, elimination, sleep and activity  Note: On video monitor. Fall precautions placed. Problem: Safety - Adult  Goal: Free from fall injury  2/11/2023 1400 by Sonja Robb RN  Outcome: Adequate for Discharge  2/11/2023 1340 by Sonja Robb RN  Outcome: Progressing  2/11/2023 0644 by Olayinka Bruno RN  Outcome: Progressing  Note: Pt is a Fall Risk. See Faviola Embs Fall Risk Score. Pt bed in low position and side rails up. Call light and belongings in reach. Pt encouraged to call for assistance.  Will continue with hourly rounds for PO intake, pain needs, toileting, and repositioning as needed. Fall precautions placed.       Problem: Risk for Elopement  Goal: Patient will not exit the unit/facility without proper excort  2/11/2023 1400 by Dolores Quinones RN  Outcome: Adequate for Discharge  2/11/2023 1340 by Dolores Quinones RN  Outcome: Progressing  2/11/2023 0644 by April Mcdonald RN  Outcome: Progressing  Flowsheets (Taken 2/11/2023 1908)  Nursing Interventions for Elopement Risk: Assist with personal care needs such as toileting, eating, dressing, as needed to reduce the risk of wandering     Problem: Neurosensory - Adult  Goal: Achieves stable or improved neurological status  2/11/2023 1400 by Dolores Quinones RN  Outcome: Adequate for Discharge  2/11/2023 1340 by Dolores Quinones RN  Outcome: Progressing  Flowsheets (Taken 2/11/2023 0811)  Achieves stable or improved neurological status: Assess for and report changes in neurological status  2/11/2023 0644 by April Mcdonald RN  Outcome: Progressing  Flowsheets (Taken 2/11/2023 0644)  Achieves stable or improved neurological status: Assess for and report changes in neurological status     Problem: Cardiovascular - Adult  Goal: Maintains optimal cardiac output and hemodynamic stability  2/11/2023 1400 by Dolores Quinones RN  Outcome: Adequate for Discharge  2/11/2023 1340 by Dolores Quinones RN  Outcome: Progressing  Flowsheets (Taken 2/11/2023 6069)  Maintains optimal cardiac output and hemodynamic stability: Monitor blood pressure and heart rate  2/11/2023 0644 by April Mcdonald RN  Outcome: Progressing  Flowsheets (Taken 2/11/2023 0644)  Maintains optimal cardiac output and hemodynamic stability:   Monitor blood pressure and heart rate   Monitor urine output and notify Licensed Independent Practitioner for values outside of normal range   Assess for signs of decreased cardiac output     Problem: Gastrointestinal - Adult  Goal: Minimal or absence of nausea and vomiting  2/11/2023 1400 by Dolores Quinones RN  Outcome: Adequate for Discharge  2/11/2023 1340 by Yoel Dickens RN  Outcome: Progressing  Flowsheets (Taken 2/11/2023 7441)  Minimal or absence of nausea and vomiting: Administer IV fluids as ordered to ensure adequate hydration  2/11/2023 0644 by Jl Coulter RN  Outcome: Progressing  Flowsheets (Taken 2/11/2023 1422)  Minimal or absence of nausea and vomiting:   Administer IV fluids as ordered to ensure adequate hydration   Provide nonpharmacologic comfort measures as appropriate     Problem: Genitourinary - Adult  Goal: Absence of urinary retention  2/11/2023 1400 by Yoel Dickens RN  Outcome: Adequate for Discharge  2/11/2023 1340 by Yoel Dickens RN  Outcome: Progressing  [address] (Taken 2/11/2023 4338)  Absence of urinary retention: Assess patients ability to void and empty bladder  2/11/2023 0644 by Jl Coulter RN  Outcome: Progressing  Flowsheets (Taken 2/11/2023 0644)  Absence of urinary retention:   Assess patients ability to void and empty bladder   Monitor intake/output and perform bladder scan as needed     Problem: Safety - Medical Restraint  Goal: Remains free of injury from restraints (Restraint for Interference with Medical Device)  Description: INTERVENTIONS:  1. Determine that other, less restrictive measures have been tried or would not be effective before applying the restraint  2. Evaluate the patient's condition at the time of restraint application  3. Inform patient/family regarding the reason for restraint  4.  Q2H: Monitor safety, psychosocial status, comfort, nutrition and hydration  2/11/2023 1400 by Yoel Dickens RN  Outcome: Adequate for Discharge  2/11/2023 1340 by Yoel Dickens RN  Outcome: Progressing     Problem: ABCDS Injury Assessment  Goal: Absence of physical injury  2/11/2023 1400 by Yoel Dickens RN  Outcome: Adequate for Discharge  2/11/2023 1340 by Yoel Dickens RN  Outcome: Progressing

## 2023-02-11 NOTE — DISCHARGE INSTR - DIET

## 2023-02-11 NOTE — PROGRESS NOTES
General Surgery   Daily Progress Note  Patient: [place]      CC: Dilated common bile duct     SUBJECTIVE:   Patient rested well overnight. Remained afebrile and HDS. Voiding, having BM. Tolerating diet without NV. HIDA results yesterday negative. Denies abdominal pain. ROS:   A 14 point review of systems was conducted, significant findings as noted above. All other systems negative. OBJECTIVE:    PHYSICAL EXAM:    Vitals:    02/10/23 1742 02/10/23 2045 02/10/23 2323 02/11/23 0450   BP:  (!) 149/79 (!) 143/74 (!) 154/84   Pulse: 74 65 88 73   Resp:       Temp:  97.4 °F (36.3 °C) 97.9 °F (36.6 °C) 97.5 °F (36.4 °C)   TempSrc:  Oral Oral Oral   SpO2:  94% 93% 93%   Weight:    187 lb 13.3 oz (85.2 kg)   Height:           General appearance: alert, no acute distress, grooming appropriate  Eyes: No scleral icterus, EOM grossly intact  Neck: trachea midline, neck supple  Chest/Lungs: normal effort with no accessory muscle use on RA  Cardiovascular: RRR, well perfused  Abdomen: Soft, non-tender, non-distended, no rebound, no guarding or rigidity. Skin: warm and dry, no rashes  Extremities: no edema, no cyanosis  Genitourinary: Grossly normal  Neuro: A&Ox2, no focal deficits, sensation intact    LABS:   Recent Labs     02/10/23  0343 02/11/23  0510   WBC 6.9 4.4   HGB 11.8* 11.8*   HCT 35.6* 34.8*   MCV 95.3 93.4    177          Recent Labs     02/10/23  0343 02/11/23  0510    140   K 4.4 4.1    110   CO2 23 24   PHOS 4.0 2.6   BUN 15 13   CREATININE 0.9 0.7*          Recent Labs     02/10/23  0343 02/11/23  0510   AST 68* 46*   * 82*   BILIDIR <0.2 <0.2   BILITOT 0.5 0.4   ALKPHOS 143* 140*          Recent Labs     02/08/23  0914   LIPASE 15.0          Recent Labs     02/10/23  0343 02/11/23  0510   PROT 5.0* 5.0*   INR 1.12  --           No results for input(s): CKTOTAL, CKMB, CKMBINDEX, TROPONINI in the last 72 hours.         ASSESSMENT & PLAN:   This is a 68 y.o. male with Hx of epigastric abdominal pain, nausea, and emesis in the setting of elevated LFT's without evidence of clear cholecystitis or stones on RUQ US.     - Symptoms improved  - LFT trending down  - ERCP with no evidence of stones or purulent material in CBD, with mildly dilated CBD to 10mm  - HIDA scan negative   - Continue Zosyn  - Given ERCP findings and negative HIDA, No surgical indications at this time  - medical management per primary   - dispo per primary   - ? Need for outpatient lap chole Bernadine Mcardle, DO  PGY1, General Surgery  02/11/23  7:49 AM  [place]ili  Pager: [phone]    . scha

## 2023-02-11 NOTE — PLAN OF CARE
Problem: Discharge Planning  Goal: Discharge to home or other facility with appropriate resources  2/11/2023 1400 by Cherri Marie RN  Outcome: Adequate for Discharge  2/11/2023 1340 by Cherri Marie RN  Outcome: Progressing  Flowsheets (Taken 2/11/2023 5066)  Discharge to home or other facility with appropriate resources: Identify barriers to discharge with patient and caregiver  2/11/2023 0644 by Kady Davis RN  Outcome: Progressing  Flowsheets (Taken 2/11/2023 6004)  Discharge to home or other facility with appropriate resources: Identify barriers to discharge with patient and caregiver     Problem: Pain  Goal: Verbalizes/displays adequate comfort level or baseline comfort level  2/11/2023 1400 by Cherri Marie RN  Outcome: Adequate for Discharge  2/11/2023 1340 by Cherri Marie RN  Outcome: Progressing  Flowsheets (Taken 2/11/2023 3353)  Verbalizes/displays adequate comfort level or baseline comfort level:   Assess pain using appropriate pain scale   Encourage patient to monitor pain and request assistance  2/11/2023 0644 by Kady Davis RN  Outcome: Progressing  Flowsheets (Taken 2/11/2023 0644)  Verbalizes/displays adequate comfort level or baseline comfort level:   Encourage patient to monitor pain and request assistance   Assess pain using appropriate pain scale     Problem: Confusion  Goal: Confusion, delirium, dementia, or psychosis is improved or at baseline  Description: INTERVENTIONS:  1. Assess for possible contributors to thought disturbance, including medications, impaired vision or hearing, underlying metabolic abnormalities, dehydration, psychiatric diagnoses, and notify attending LIP  2. [place] high risk fall precautions, as indicated  3. Provide frequent short contacts to provide reality reorientation, refocusing and direction  4. Decrease environmental stimuli, including noise as appropriate  5.  Monitor and intervene to maintain adequate nutrition, hydration, elimination, sleep and activity  6. If unable to ensure safety without constant attention obtain sitter and review sitter guidelines with assigned personnel  7. Initiate Psychosocial CNS and Spiritual Care consult, as indicated  2/11/2023 1400 by Kacey Perez RN  Outcome: Adequate for Discharge  2/11/2023 1340 by Kacey Perez RN  Outcome: Progressing  Flowsheets (Taken 2/11/2023 0489)  Effect of thought disturbance (confusion, delirium, dementia, or psychosis) are managed with adequate functional status: Assess for contributors to thought disturbance, including medications, impaired vision or hearing, underlying metabolic abnormalities, dehydration, psychiatric diagnoses, notify LIP  2/11/2023 0644 by Gema Murguia RN  Outcome: Progressing  Flowsheets (Taken 2/11/2023 3114)  Effect of thought disturbance (confusion, delirium, dementia, or psychosis) are managed with adequate functional status:   Assess for contributors to thought disturbance, including medications, impaired vision or hearing, underlying metabolic abnormalities, dehydration, psychiatric diagnoses, notify [facility] high risk fall precautions, as indicated   Provide frequent short contacts to provide reality reorientation, refocusing and direction   Decrease environmental stimuli, including noise as appropriate   Monitor and intervene to maintain adequate nutrition, hydration, elimination, sleep and activity  Note: On video monitor. Fall precautions placed. Problem: Safety - Adult  Goal: Free from fall injury  2/11/2023 1400 by Kacey Perez RN  Outcome: Adequate for Discharge  2/11/2023 1340 by Kacye Perez RN  Outcome: Progressing  2/11/2023 0644 by Gema Murguia RN  Outcome: Progressing  Note: Pt is a Fall Risk. See Hedda Alberto Fall Risk Score. Pt bed in low position and side rails up. Call light and belongings in reach. Pt encouraged to call for assistance.  Will continue with hourly rounds for PO intake, pain needs, toileting, and repositioning as needed. Fall precautions placed.       Problem: Risk for Elopement  Goal: Patient will not exit the unit/facility without proper excort  2/11/2023 1400 by Jong Caicedo RN  Outcome: Adequate for Discharge  2/11/2023 1340 by Jong Caiecdo RN  Outcome: Progressing  2/11/2023 0644 by Chad Black RN  Outcome: Progressing  Flowsheets (Taken 2/11/2023 6504)  Nursing Interventions for Elopement Risk: Assist with personal care needs such as toileting, eating, dressing, as needed to reduce the risk of wandering     Problem: Neurosensory - Adult  Goal: Achieves stable or improved neurological status  2/11/2023 1400 by Jong Caicedo RN  Outcome: Adequate for Discharge  2/11/2023 1340 by Jong Caicedo RN  Outcome: Progressing  Flowsheets (Taken 2/11/2023 0811)  Achieves stable or improved neurological status: Assess for and report changes in neurological status  2/11/2023 0644 by Chad Black RN  Outcome: Progressing  Flowsheets (Taken 2/11/2023 0644)  Achieves stable or improved neurological status: Assess for and report changes in neurological status     Problem: Cardiovascular - Adult  Goal: Maintains optimal cardiac output and hemodynamic stability  2/11/2023 1400 by Jong Caicedo RN  Outcome: Adequate for Discharge  2/11/2023 1340 by Jnog Caicedo RN  Outcome: Progressing  Flowsheets (Taken 2/11/2023 6602)  Maintains optimal cardiac output and hemodynamic stability: Monitor blood pressure and heart rate  2/11/2023 0644 by Chad Black RN  Outcome: Progressing  Flowsheets (Taken 2/11/2023 0644)  Maintains optimal cardiac output and hemodynamic stability:   Monitor blood pressure and heart rate   Monitor urine output and notify Licensed Independent Practitioner for values outside of normal range   Assess for signs of decreased cardiac output     Problem: Gastrointestinal - Adult  Goal: Minimal or absence of nausea and vomiting  2/11/2023 1400 by Jong Caicedo RN  Outcome: Adequate for Discharge  2/11/2023 1340 by Yamilka Perry RN  Outcome: Progressing  Flowsheets (Taken 2/11/2023 5380)  Minimal or absence of nausea and vomiting: Administer IV fluids as ordered to ensure adequate hydration  2/11/2023 0644 by Bernard Granados RN  Outcome: Progressing  Flowsheets (Taken 2/11/2023 7006)  Minimal or absence of nausea and vomiting:   Administer IV fluids as ordered to ensure adequate hydration   Provide nonpharmacologic comfort measures as appropriate     Problem: Genitourinary - Adult  Goal: Absence of urinary retention  2/11/2023 1400 by Yamilka Perry RN  Outcome: Adequate for Discharge  2/11/2023 1340 by Yamilka Perry RN  Outcome: Progressing  [address] (Taken 2/11/2023 2992)  Absence of urinary retention: Assess patients ability to void and empty bladder  2/11/2023 0644 by Bernard Granados RN  Outcome: Progressing  Flowsheets (Taken 2/11/2023 0644)  Absence of urinary retention:   Assess patients ability to void and empty bladder   Monitor intake/output and perform bladder scan as needed     Problem: Safety - Medical Restraint  Goal: Remains free of injury from restraints (Restraint for Interference with Medical Device)  Description: INTERVENTIONS:  1. Determine that other, less restrictive measures have been tried or would not be effective before applying the restraint  2. Evaluate the patient's condition at the time of restraint application  3. Inform patient/family regarding the reason for restraint  4.  Q2H: Monitor safety, psychosocial status, comfort, nutrition and hydration  2/11/2023 1400 by Yamilka Perry RN  Outcome: Adequate for Discharge  2/11/2023 1340 by Yamilka Perry RN  Outcome: Progressing     Problem: ABCDS Injury Assessment  Goal: Absence of physical injury  2/11/2023 1400 by Yamilka Perry RN  Outcome: Adequate for Discharge  2/11/2023 1340 by Yamilka Perry RN  Outcome: Progressing

## 2023-02-12 NOTE — DISCHARGE SUMMARY
INTERNAL MEDICINE DEPARTMENT AT [address] [place] [address]  DISCHARGE SUMMARY    Patient ID: Durga Lentz                                             Discharge Date: 2/12/2023   Patient's PCP: Monique Chin MD                                          Discharge Physician: Kasey Tai DO   Admit Date: 2/7/2023   Admitting Physician: Monique Chin MD    PROBLEMS DURING HOSPITALIZATION:  Present on Admission:   Elevated liver enzymes   Sepsis without acute organ dysfunction (HCC)   Abdominal pain, epigastric   LFT elevation   Lactic acidosis   Nausea and vomiting   Ascending cholangitis   E coli bacteremia   Acute cholecystitis      DISCHARGE DIAGNOSES: Abdominal pain, transaminates, Sepsis, and lactic acidosis     HPI: Durga Lentz is a 68 y.o. male with a PMHx: HTN, CAD, DM, Prior MI who presented to the ED c/o of N/V and abdominal pain onset 4 hours prior to presentation. Per spouse at bedside he had 2 episodes of vomiting- non bilious, mainly stomach contents with no blood. After his second episode of emesis his abdominal pain improved. Patient described abdominal pain mainly in the epigastric region that initially resolved when he presented to the ED however it started to increase again. Last bowel movement noted to be same day of ED presentation. Due to patient having a prior MI wife reported that she was very concerned that he was having another 1 therefore she gave him a sublingual nitroglycerin tablet which did not improve his abdominal pain. In the ED patient reported that the pain felt much different from when he had his myocardial infarction. On presentation to the ED he was hemodynamically stable, CBC was unremarkable and BMP also unremarkable however CMP with elevated alkaline phosphatase, ALT/AST. Urinalysis was not revealing of any infection. CT abdomen overall revealed no acute abnormalities however there was some periportal edema seen.   Per ED physician when he returned from his CT abdomen he was noted to be more confused and febrile at 100.6. Per wife he does wax and wane with confusion over the past year and a half therefore she was not remarkably concerned. He was given a dose of Haldol 2.5 mg in the ED due to him being agitated and attempting to leave the emergency department. He was noted not to have decision-making capacity. Patient seen at bedside. Only alert to name however he Responded That He Does Not Know the Time of Year Alteplase. He is able to hold a conversation and answer questions concerning his symptoms. Wife reports that early on during the day he had a mild headache. He denies any chest pain, palpitations, diarrhea, dizziness or lightheadedness. He will be admitted for further evaluation of his elevated liver enzymes, confusion and new fever. The following issues were addressed during hospitalization:    Abdominal pain   Elevated liver enzymes  Ecoli bacteremia   Sepsis   Patient presenting with complaints of abdominal pain and nausea and vomiting. His pain resolved when he was in the emergency department. CT abdomen and pelvis only revealing mild periportal edema. Patient with no leukocytosis or jaundice however during ED stay he became altered with an episode of hypotension and febrile. Moderate elevation in AST and ALT 5x ULN. He likely had a gallstone which already passed indicated by elevated alk phos but normal bilirubin. He was fluid resuscitated. His acute hepatitis panel was nonreactive. He had a RUQ US done which showed dilated CBD. GI was consulted and he had a MRCP done which showed mild distention of GB and confirming dilation of CBD. They then did a ERCP on 2/9 which showed dilated CBD w/o evidence of stone or purulent material.  He then had a HIDA scan done on 2/10 which was unremarkable and showed no evidence of obstruction of cystic duct or common bile duct.   General surgery was consulted and did not recommend cholecystectomy at this time. Patient's blood cultures came back positive for E. coli and he was started on Zosyn. Upon discharge he was given 3 days of Augmentin. At the time of discharge patient's abdominal pain had completely resolved and he is tolerating a regular diet well. His liver enzymes continue to downtrend. He had no complaints on the day of discharge. He was seen and evaluated and deemed stable enough for discharge home. All questions were answered and he was provided with the appropriate follow-up. Lactic acidosis  Patient received a 1 L LR bolus in the ED. His lactic acid initially was 2.3 and bumped up to 4.4 then down trended to 2.2 and then 2.0.       Consults: GI and general surgery  Significant Diagnostic Studies: Chest x-ray, CT abdomen pelvis, right upper quadrant ultrasound, MRCP, and ERCP  Treatments: antibiotics: Zosyn  Disposition: home  Discharged Condition: Stable  Follow Up: Primary Care Physician in one week and GI within 3 weeks    DISCHARGE MEDICATION:       Medication List        START taking these medications      amoxicillin-clavulanate 875-125 MG per tablet  Commonly known as: AUGMENTIN  Take 1 tablet by mouth 2 times daily for 3 days     tamsulosin 0.4 MG capsule  Commonly known as: FLOMAX  Take 1 capsule by mouth daily            CONTINUE taking these medications      aspirin 81 MG EC tablet     atorvastatin 80 MG tablet  Commonly known as: LIPITOR  TAKE 1 TABLET EVERY DAY     esomeprazole 20 MG delayed release capsule  Commonly known as: NexIUM     metFORMIN 1000 MG tablet  Commonly known as: GLUCOPHAGE  TAKE 1 TABLET BY MOUTH TWICE A DAY WITH MEALS     metoprolol tartrate 25 MG tablet  Commonly known as: LOPRESSOR  TAKE 1/2 TABLET TWICE DAILY     nitroGLYCERIN 0.4 MG SL tablet  Commonly known as: NITROSTAT  Place 1 tablet under the tongue every 5 minutes as needed (PRN)     ramipril 5 MG capsule  Commonly known as: ALTACE  TAKE 1 CAPSULE EVERY DAY               Where to Get Your Medications        These medications were sent to [facility], [address] [address]      Phone: [phone]   amoxicillin-clavulanate 875-125 MG per tablet  tamsulosin 0.4 MG capsule          Activity: activity as tolerated  Diet: regular diet  Wound Care: none needed    Time Spent on discharge is more than 30 minutes    Signed:  Mckayla Lezama DO, PGY-1  2/12/2023

## 2023-02-13 ENCOUNTER — TELEPHONE (OUTPATIENT)
Dept: INTERNAL MEDICINE CLINIC | Age: 78
End: 2023-02-13

## 2023-02-13 ENCOUNTER — CARE COORDINATION (OUTPATIENT)
Dept: CASE MANAGEMENT | Age: 78
End: 2023-02-13

## 2023-02-13 DIAGNOSIS — R74.8 ELEVATED LIVER ENZYMES: Primary | ICD-10-CM

## 2023-02-13 PROCEDURE — 1111F DSCHRG MED/CURRENT MED MERGE: CPT | Performed by: HOSPITALIST

## 2023-02-13 NOTE — CARE COORDINATION
[address] Care Transitions Initial Follow Up Call    Call within 2 business days of discharge: Yes    Patient Current Location:  Home: [address] [place] [address]    LPN Care Coordinator contacted the patient by telephone to perform post hospital discharge assessment. Verified name and  with patient as identifiers. Provided introduction to self, and explanation of the LPN Care Coordinator role. Patient: Brianna Adrian Patient : 1945   MRN: [id number]  Reason for Admission: LFT elevation  Discharge Date: 23 RARS: Readmission Risk Score: 12.6      Last Discharge [address]       Date Complaint Diagnosis Description Type Department Provider    23 Abdominal Pain LFT elevation . .. ED to Hosp-Admission (Discharged) (ADMITTED) [place] [address] PCU Yadi Ragsdale MD; Simón Cao. .. Was this an external facility discharge? No Discharge Facility: NA    Challenges to be reviewed by the provider   Additional needs identified to be addressed with provider: No  none               Method of communication with provider: none. N CC spoke with patient. States he is pretty good. Was drowsy this AM, but has been able to do his ADLs ok & even go outside. Denies pain, N/V, fever/chills. Appetite improving. Denies problems with bladder, had some loose stools this AM that seem to have resolved. Monitors BP regularly, states it is \"good. \" No values given. States he is active. Full medication reconciliation and 1111f completed. PCP f/u 3/21. Has spoken with GI. Denies needs. Tiff Garcia LPN CC  Care Transitions  [phone]    [facility] Care Coordinator reviewed discharge instructions, medical action plan, and red flags with patient who verbalized understanding. The patient was given an opportunity to ask questions and does not have any further questions or concerns at this time. Were discharge instructions available to patient? Yes.  Reviewed appropriate site of care based on symptoms and resources available to patient including: PCP  Specialist  Urgent care clinics  When to call 911. The patient agrees to contact the PCP office for questions related to their healthcare. Advance Care Planning:   Does patient have an Advance Directive: not on file. Medication reconciliation was performed with patient, who verbalizes understanding of administration of home medications. Medications reviewed, 1111F entered: yes    Was patient discharged with a pulse oximeter? no    Non-face-to-face services provided:  Obtained and reviewed discharge summary and/or continuity of care documents  Education of patient/family/caregiver/guardian to support self-management-BP, f/u  Assessment and support for treatment adherence and medication management-full medication reconciliation completed    Offered patient enrollment in the Remote Patient Monitoring (RPM) program for in-home monitoring: NA.    Care Transitions 24 Hour Call    Do you have a copy of your discharge instructions?: Yes  Do you have all of your prescriptions and are they filled?: Yes  Have you been contacted by a [facility] Pharmacist?: No  Have you scheduled your follow up appointment?: Yes  How are you going to get to your appointment?: Car - family or friend to transport  Do you have support at home?: Partner/Spouse/SO  Do you feel like you have everything you need to keep you well at home?: Yes  Are you an active caregiver in your home?: No  Care Transitions Interventions         Discussed follow-up appointments. If no appointment was previously scheduled, appointment scheduling offered: Yes. Is follow up appointment scheduled within 7 days of discharge? Yes. Follow Up  Future Appointments   Date Time Provider Monisha Yeung   3/21/2023 10:00 AM Delfin Fung MD [facility] [place] - [facility]   5/3/2023  1:30 PM [facility] provided contact information.   Plan for follow-up call in 5-7 days based on severity of symptoms and risk factors.   Plan for next call: symptom management-pain, N/V/D  self management-BP  follow-up appointment-PCP 3/21  medication management-new or changed meds  ULISES Guerrero LPN

## 2023-02-13 NOTE — TELEPHONE ENCOUNTER
Pt wife states her  was prescribed antibiotic at the hospital and she's hearing 3 different things as far as dispense instructions.     She's calling to get clarification regarding the amount her  should be taking        amoxicillin-clavulanate (AUGMENTIN) 875-125 MG per tablet          Please advise

## 2023-02-14 NOTE — TELEPHONE ENCOUNTER
Patients wife called regarding patients diarrhea     She said he has had it ever since he started the amoxicillin     She wants to know what to do    She said its not bad he may have it once

## 2023-02-16 ENCOUNTER — TELEPHONE (OUTPATIENT)
Dept: INTERNAL MEDICINE CLINIC | Age: 78
End: 2023-02-16

## 2023-02-16 NOTE — TELEPHONE ENCOUNTER
Patient is still having diarrhea. Would like to know if there is any foods he should avoid or anything he can take for this. Pls call and advise.

## 2023-02-17 NOTE — TELEPHONE ENCOUNTER
Per  Stop antibiotic, take probiotic or yogurt 1 cup daily Gatorade or Pedilyte. Addie Dotson advised.

## 2023-02-17 NOTE — TELEPHONE ENCOUNTER
Patients wife called again regarding this she wanted to know what should he be eating       Please advise and call

## 2023-02-20 ENCOUNTER — CARE COORDINATION (OUTPATIENT)
Dept: CASE MANAGEMENT | Age: 78
End: 2023-02-20

## 2023-02-22 ENCOUNTER — CARE COORDINATION (OUTPATIENT)
Dept: CASE MANAGEMENT | Age: 78
End: 2023-02-22

## 2023-02-22 NOTE — CARE COORDINATION
[facility] Care Transitions Follow Up Call    Patient Current Location:  Home: [address] [place] [id number]    Care Transition Nurse contacted the patient by telephone to follow up after admission. Verified name and  with patient as identifiers. Patient: Willian Carpenter  Patient : 1945   MRN: <[id number]>  Reason for Admission: elevated liver enzymes  Discharge Date: 23 RARS: Readmission Risk Score: 12.6      Needs to be reviewed by the provider   Additional needs identified to be addressed with provider: No  none             Method of communication with provider: none. Patient answered call and verified . Patient pleasant and agreeable to transition call. Briefly spoke to patient, but stated that he feels like a whole new person. Stated that he is feeling good and back to his normal self. Patient appreciative of call, but was unable to talk any further and ended call. Addressed changes since last contact:  none  Discussed follow-up appointments. If no appointment was previously scheduled, appointment scheduling offered: No.   Is follow up appointment scheduled within 7 days of discharge? Yes. Follow Up  Future Appointments   Date Time Provider Monisha Yeung   2023 11:40 AM Naif Amador MD [facility] [place] - [facility]   5/3/2023  1:30 PM Dank Altman MD [facility] follow up appointment(s):     Care Transition Nurse reviewed medical action plan with patient and discussed any barriers to care and/or understanding of plan of care after discharge. Discussed appropriate site of care based on symptoms and resources available to patient including: PCP  Specialist. The patient agrees to contact the PCP office for questions related to their healthcare. Advance Care Planning:   not on file.      Patients top risk factors for readmission: functional physical ability  Interventions to address risk factors: Education of patient/family/caregiver/guardian to support self-management-     Offered patient enrollment in the Remote Patient Monitoring (RPM) program for in-home monitoring:  unable to discuss at call . Care Transitions Subsequent and Final Call    Subsequent and Final Calls  Do you have any ongoing symptoms?: No  Have your medications changed?: No  Do you have any questions related to your medications?: No  Do you currently have any active services?: No  Do you have any needs or concerns that I can assist you with?: No  Identified Barriers: None  Care Transitions Interventions  Other Interventions:             Care Transition Nurse provided contact information for future needs. Plan for follow-up call in 7-10 days based on severity of symptoms and risk factors.   Plan for next call: follow-up appointment-scheduled to see Dr Sade Back RN

## 2023-02-23 ENCOUNTER — OFFICE VISIT (OUTPATIENT)
Dept: INTERNAL MEDICINE CLINIC | Age: 78
End: 2023-02-23

## 2023-02-23 VITALS
OXYGEN SATURATION: 97 % | TEMPERATURE: 98.6 F | SYSTOLIC BLOOD PRESSURE: 132 MMHG | WEIGHT: 187 LBS | HEART RATE: 71 BPM | DIASTOLIC BLOOD PRESSURE: 66 MMHG | BODY MASS INDEX: 24.67 KG/M2

## 2023-02-23 DIAGNOSIS — E83.42 HYPOMAGNESEMIA: ICD-10-CM

## 2023-02-23 DIAGNOSIS — Z09 HOSPITAL DISCHARGE FOLLOW-UP: Primary | ICD-10-CM

## 2023-02-23 DIAGNOSIS — N40.1 BENIGN PROSTATIC HYPERPLASIA WITH URINARY FREQUENCY: ICD-10-CM

## 2023-02-23 DIAGNOSIS — R74.8 ELEVATED LIVER ENZYMES: ICD-10-CM

## 2023-02-23 DIAGNOSIS — E11.9 TYPE 2 DIABETES MELLITUS WITHOUT COMPLICATION, WITHOUT LONG-TERM CURRENT USE OF INSULIN (HCC): ICD-10-CM

## 2023-02-23 DIAGNOSIS — R35.0 BENIGN PROSTATIC HYPERPLASIA WITH URINARY FREQUENCY: ICD-10-CM

## 2023-02-23 DIAGNOSIS — I10 ESSENTIAL HYPERTENSION: ICD-10-CM

## 2023-02-23 RX ORDER — TAMSULOSIN HYDROCHLORIDE 0.4 MG/1
0.4 CAPSULE ORAL DAILY
Qty: 30 CAPSULE | Refills: 5 | Status: SHIPPED | OUTPATIENT
Start: 2023-02-23

## 2023-02-23 SDOH — ECONOMIC STABILITY: FOOD INSECURITY: WITHIN THE PAST 12 MONTHS, THE FOOD YOU BOUGHT JUST DIDN'T LAST AND YOU DIDN'T HAVE MONEY TO GET MORE.: NEVER TRUE

## 2023-02-23 SDOH — ECONOMIC STABILITY: INCOME INSECURITY: HOW HARD IS IT FOR YOU TO PAY FOR THE VERY BASICS LIKE FOOD, HOUSING, MEDICAL CARE, AND HEATING?: NOT HARD AT ALL

## 2023-02-23 SDOH — ECONOMIC STABILITY: HOUSING INSECURITY
IN THE LAST 12 MONTHS, WAS THERE A TIME WHEN YOU DID NOT HAVE A STEADY PLACE TO SLEEP OR SLEPT IN A SHELTER (INCLUDING NOW)?: NO

## 2023-02-23 SDOH — ECONOMIC STABILITY: FOOD INSECURITY: WITHIN THE PAST 12 MONTHS, YOU WORRIED THAT YOUR FOOD WOULD RUN OUT BEFORE YOU GOT MONEY TO BUY MORE.: NEVER TRUE

## 2023-02-23 ASSESSMENT — PATIENT HEALTH QUESTIONNAIRE - PHQ9
1. LITTLE INTEREST OR PLEASURE IN DOING THINGS: 0
SUM OF ALL RESPONSES TO PHQ QUESTIONS 1-9: 0
SUM OF ALL RESPONSES TO PHQ QUESTIONS 1-9: 0
2. FEELING DOWN, DEPRESSED OR HOPELESS: 0
SUM OF ALL RESPONSES TO PHQ QUESTIONS 1-9: 0
SUM OF ALL RESPONSES TO PHQ QUESTIONS 1-9: 0
SUM OF ALL RESPONSES TO PHQ9 QUESTIONS 1 & 2: 0

## 2023-02-23 NOTE — PROGRESS NOTES
Post-Discharge Transitional Care Follow Up      Jed Mcintosh   YOB: 1945    Date of Office Visit:  2/23/2023  Date of Hospital Admission: 2/7/23  Date of Hospital Discharge: 2/11/23  Readmission Risk Score (high >=14%. Medium >=10%):Readmission Risk Score: 12.6      Care management risk score Rising risk (score 2-5) and Complex Care (Scores >=6): No Risk Score On File     Non face to face  following discharge, date last encounter closed (first attempt may have been earlier): 02/13/2023     Call initiated 2 business days of discharge: Yes     Hospital discharge follow-up  Type 2 diabetes mellitus without complication, without long-term current use of insulin ([facility] [address]  -     CBC with Auto Differential; Future  -     Comprehensive Metabolic Panel; Future  -     Carb controlled diet  -     Continue to take metformin 1000 mg orally twice a day  Hypomagnesemia  -     Magnesium; Future  -     We will supplement with oral magnesium as necessary    Elevated liver enzymes  -     Comprehensive Metabolic Panel; Future    Benign prostatic hyperplasia with urinary frequency  -     tamsulosin (FLOMAX) 0.4 MG capsule; Take 1 capsule by mouth daily, Disp-30 capsule, R-5 Normal    Essential hypertension, controlled        -     Continue ramipril  Medical Decision Making: moderate complexity  Return in about 3 months (around 5/23/2023) for diabetes, HTN, BPH. On this date 2/23/2023 I have spent 20 minutes reviewing previous notes, test results and face to face with the patient discussing the diagnosis and importance of compliance with the treatment plan as well as documenting on the day of the visit. Subjective:   HPI  Mr. Millicent Archuleta was admitted to the [facility] for evaluation of nausea and vomiting for hour of duration. While in the emergency department he was hemodynamically stable, complete blood count was unremarkable, although, he had significantly elevated alkaline phosphatase as well as ALT/AST. Urinalysis was negative for acute UTI. CT scan of the abdomen overall revealed no acute abnormalities but mild periportal edema. Also on presentation to the hospital the patient's temperature was 100.6F  The patient was admitted to the hospital started on IV fluids and broad-spectrum IV antibiotics. Supportive care for nausea and vomiting was provided. Right upper quadrant ultrasound was performed on 2/8/2023:  1. Nonspecific gallbladder wall thickening without shadowing calculus or pain over the gallbladder. Cholecystitis is considered unlikely but cannot be entirely excluded. 2. Dilated common bile duct without definite obstructing abnormality. Consider further evaluation with MRCP or ERCP. 3. Equivocal edema along the pancreas which appeared normal on CT of the abdomen from earlier today. The patient was evaluated by gastroenterology team and MRI of the abdomen was ordered:  1. Mild distention of the gallbladder with significant wall thickening. Small amount of pericholecystic fluid is nonspecific. Possible tiny gallstone at the neck is favored to be artifactual. Otherwise no gallstones demonstrated. 2.  Dilation of the common bile duct measuring up to 12 mm with mild diffuse intrahepatic duct dilation. This is not specific. No focal stricture, choledocholithiasis, or obstructing mass evident. 3.  Multiple duodenal diverticula including duodenal diverticula at the ampulla. No significant compression on the adjacent distal common bile duct and pancreatic ducts demonstrated. 4.  Benign cyst in the interpolar right kidney measuring 4.6 cm.   5.  Nonspecific tiny cystic lesions in the pancreas. Largest measures 6 mm at the inferior pancreatic body. These could reflect side branch IPMN. Based on current guidelines, recommend follow-up abdominal MRI in 2 years to evaluate stability. ERCP with endoscopic ultrasound was performed on 2/10/2023; again no significant abnormality was discovered.   It was our mutual understanding that the patient most probably had gallstones which she was able to pass and presentation of nausea vomiting and fever was acute cholecystitis/ascending cholangitis secondary to that. Patient also was evaluated by our surgical team.  He had HIDA scan on 2/10/2023, which was essentially unremarkable. The decision was made that patient would not require laparoscopic cholecystectomy. Inpatient course: Discharge summary reviewed- see chart. Interval history/Current status: Today he is doing well. No chest pain/shortness of breath. No heart palpitations. No nausea, no vomiting, no diarrhea. No right upper quadrant abdominal pain. He adheres to low-fat diet. Takes all his scheduled medications regularly. Patient Active Problem List   Diagnosis    HLD (hyperlipidemia)    Essential hypertension, benign    Old myocardial infarction    Coronary atherosclerosis of native coronary artery    Respiratory failure (HCC)    Hemorrhagic shock (HCC)    Alicja-Cancino tear    Pre-syncope    GI bleed    History of lower GI bleeding    Encounter to establish care    Tinea corporis    Skin lesion    Hyperglycemia    Right upper quadrant abdominal pain    Elevated liver enzymes    Sepsis without acute organ dysfunction (HCC)    Abdominal pain, epigastric    LFT elevation    Lactic acidosis    Nausea and vomiting    Ascending cholangitis    E coli bacteremia    Acute cholecystitis       Medications listed as ordered at the time of discharge from hospital     Medication List            Accurate as of February 23, 2023  6:48 PM. If you have any questions, ask your nurse or doctor.                 CONTINUE taking these medications      aspirin 81 MG EC tablet     atorvastatin 80 MG tablet  Commonly known as: LIPITOR  TAKE 1 TABLET EVERY DAY     esomeprazole 20 MG delayed release capsule  Commonly known as: NexIUM     metFORMIN 1000 MG tablet  Commonly known as: GLUCOPHAGE  TAKE 1 TABLET BY MOUTH TWICE A DAY WITH MEALS     metoprolol tartrate 25 MG tablet  Commonly known as: LOPRESSOR  TAKE 1/2 TABLET TWICE DAILY     nitroGLYCERIN 0.4 MG SL tablet  Commonly known as: NITROSTAT  Place 1 tablet under the tongue every 5 minutes as needed (PRN)     ramipril 5 MG capsule  Commonly known as: ALTACE  TAKE 1 CAPSULE EVERY DAY     tamsulosin 0.4 MG capsule  Commonly known as: FLOMAX  Take 1 capsule by mouth daily               Where to Get Your Medications        These medications were sent to [facility] [address] [address] [place] [address], [facility] [address]      Phone: [phone]   tamsulosin 0.4 MG capsule          Medications marked \"taking\" at this time  Outpatient Medications Marked as Taking for the 2/23/23 encounter (Office Visit) with Porfirio Laura MD   Medication Sig Dispense Refill    tamsulosin (FLOMAX) 0.4 MG capsule Take 1 capsule by mouth daily 30 capsule 5    metFORMIN (GLUCOPHAGE) 1000 MG tablet TAKE 1 TABLET BY MOUTH TWICE A DAY WITH MEALS 180 tablet 3    metoprolol tartrate (LOPRESSOR) 25 MG tablet TAKE 1/2 TABLET TWICE DAILY 90 tablet 3    atorvastatin (LIPITOR) 80 MG tablet TAKE 1 TABLET EVERY DAY 90 tablet 3    ramipril (ALTACE) 5 MG capsule TAKE 1 CAPSULE EVERY DAY 90 capsule 3    nitroGLYCERIN (NITROSTAT) 0.4 MG SL tablet Place 1 tablet under the tongue every 5 minutes as needed (PRN) 25 tablet 5    esomeprazole (NEXIUM) 20 MG delayed release capsule Take 20 mg by mouth every morning (before breakfast)      aspirin 81 MG EC tablet Take 81 mg by mouth daily Take 1/2 tablet twice daily          Medications patient taking as of now reconciled against medications ordered at time of hospital discharge: Yes    Review of Systems   All other systems reviewed and are negative.     Objective:    /66   Pulse 71   Temp 98.6 °F (37 °C) (Temporal)   Wt 187 lb (84.8 kg)   SpO2 97%   BMI 24.67 kg/m²   Physical Exam  Vitals and nursing note reviewed. Constitutional:       General: He is not in acute distress. Appearance: Normal appearance. He is well-developed. HENT:      Head: Normocephalic and atraumatic. Right Ear: Tympanic membrane, ear canal and external ear normal. There is no impacted cerumen. Left Ear: Tympanic membrane, ear canal and external ear normal. There is no impacted cerumen. Nose:      Comments: Nasal mucosa is moderately swollen and mildly inflamed     Mouth/Throat:      Mouth: Mucous membranes are moist.      Pharynx: Oropharynx is clear. No oropharyngeal exudate or posterior oropharyngeal erythema. Eyes:      General: No scleral icterus. Extraocular Movements: Extraocular movements intact. Conjunctiva/sclera: Conjunctivae normal.      Pupils: Pupils are equal, round, and reactive to light. Neck:      Vascular: No carotid bruit or JVD. Cardiovascular:      Rate and Rhythm: Normal rate and regular rhythm. Heart sounds: Normal heart sounds. No murmur heard. No friction rub. No gallop. Pulmonary:      Effort: Pulmonary effort is normal. No respiratory distress. Breath sounds: Normal breath sounds. No wheezing or rales. Abdominal:      General: Bowel sounds are normal. There is no distension. Palpations: Abdomen is soft. There is no mass. Tenderness: There is no abdominal tenderness. There is no right CVA tenderness or left CVA tenderness. Hernia: No hernia is present. Musculoskeletal:         General: No tenderness. Normal range of motion. Cervical back: Normal range of motion and neck supple. Right lower leg: No edema. Left lower leg: No edema. Lymphadenopathy:      Cervical: No cervical adenopathy. Skin:     General: Skin is warm and dry. Capillary Refill: Capillary refill takes less than 2 seconds. Findings: No erythema, lesion or rash. Neurological:      General: No focal deficit present.       Mental Status: He is alert and oriented to person, place, and time. Cranial Nerves: No cranial nerve deficit. Sensory: No sensory deficit. Gait: Gait normal.      Deep Tendon Reflexes: Reflexes normal.       An electronic signature was used to authenticate this note.   --Jun Steen MD

## 2023-03-01 ENCOUNTER — CARE COORDINATION (OUTPATIENT)
Dept: CASE MANAGEMENT | Age: 78
End: 2023-03-01

## 2023-03-01 NOTE — CARE COORDINATION
[facility] Care Transitions Follow Up Call    Care Transition Nurse contacted the patient by telephone to perform post hospital discharge assessment. Verified name with patient as identifier. Provided introduction to self, and explanation of the Care Transition Nurse role. Patient: Barbara Soliman Patient :  1945  MRN: [id number]  Reason for Admission:  sepsis / abdominal pain   Discharge Date:    RARS: 13    Challenges to be reviewed by the provider   Additional needs identified to be addressed with provider:    no    AMB CC Provider Discharge Needs:  none          Method of communication with provider : none      SUMMARY  CTN spoke to the Pt who reports the following:    -Denies c/o fever, chills, nausea, vomiting, cough, congestion, SOB / DB, chest pain, and abdominal pain. -Denies issues with fluid intake, appetite, urination, and LBM was today.  -Saw PCP .  -Has not scheduled with Dr Justina Magana. Pt plans to call and schedule.  -Confirms they have all meds and taking as prescribed. From CDC: Are you at higher risk for severe illness? Wash your hands often. Avoid close contact (6 feet, which is about two arm lengths) w/people who are sick. Put distance between yourself and other people if COVID-19 is spreading in your community. Clean and disinfect frequently touched surfaces. Avoid all cruise travel and non-essential air travel. Call your healthcare professional if you have concerns about COVID-19 and your underlying condition or if you are sick. Pt will take all meds as prescribed and schedule / keep doctor's appt. [facility] provided education on s/s that require medical attention and when to seek medical attention. [facility] advised Pt of use urgent care or physician's 24 hr access line if assistance is needed after hours or on the weekend. Patient denies any needs or concerns and is agreeable with additional calls.     Addressed changes since last contact:  medications-  see note    Discussed follow-up appointments. If no appointment was previously scheduled, appointment scheduling offered: Yes      Is follow up appointment scheduled within 7 days of discharge? No    Care Transitions 24 Hour Call    Do you have any ongoing symptoms?: No  Do you have all of your prescriptions and are they filled?: Yes  Were you discharged with any Home Care or Post Acute Services: No  Do you have support at home?: Partner/Spouse/SO  Are you an active caregiver in your home?: No  Care Transitions Interventions         Non-[facility] follow up appointment(s):    Care Transition Nurse reviewed discharge instructions, medical action plan and red flags with patient and discussed any barriers to care and/or understanding of plan of care after discharge. Discussed appropriate site of care based on symptoms and resources available to patient including:    Specialist  After hours contact number  Benefits related nurse triage line  Urgent care clinics  [facility] 24/7  When to call [address] [place] [address] for reactivation or family member permission [phone]. The patient agrees to contact the PCP office for questions related to their healthcare.     Advance Care Planning: Does patient have an Advance Directive: patient declined education    Patients top risk factors for readmission:   functional cognitive ability  functional physical ability  lack of knowledge about disease  medical condition  medication management  multiple health system providers    Interventions to address risk factors:  Education of patient/family/caregiver/guardian to support self-management  Assessment and support for treatment adherence and medication management     Care Transitions Subsequent and Final Call    Subsequent and Final Calls  Do you have any ongoing symptoms?: No  Have your medications changed?: Yes  Patient Reports: completed antibiotic  Do you have any questions related to your medications?: No  Do you currently have any active services?: No  Do you have any needs or concerns that I can assist you with?: No  Care Transitions Interventions  Other Interventions:              Care Transition Nurse provided contact information for future needs. Plan for f/u call in 7-10 days based on severity of symptoms and risk factors. Plan for next call:   symptom management  self-management  follow up appointment  medication management    Future Appointments   Date Time Provider Monisha Yeung   5/3/2023  1:30 PM Ariadna Lehman MD [facility].  SUKHWINDER Adhikari, RN  Care Transition Coordinator  Contact Number:  [phone]

## 2023-03-08 ENCOUNTER — CARE COORDINATION (OUTPATIENT)
Dept: CASE MANAGEMENT | Age: 78
End: 2023-03-08

## 2023-03-08 NOTE — CARE COORDINATION
[facility] Care Transitions Follow Up Call    Care Transition Nurse contacted the patient by telephone to perform post hospital discharge assessment. Verified name with patient as identifier. Provided introduction to self, and explanation of the Care Transition Nurse role. Patient: Moira Fournier Patient :  1945  MRN: [id number]  Reason for Admission:  sepsis / abdominal pain  Discharge Date:    RARS: 13    Challenges to be reviewed by the provider   Additional needs identified to be addressed with provider:   no    AMB CC Provider Discharge Needs:  none         Method of communication with provider : none      SUMMARY  CTN spoke to the Pt who reports the following:    -Has no issues today, \"doing good, and \"walking a lot\". -Denies fever, chills, nausea, vomiting, cough, congestion, SOB / DB, chest pain, and abdominal pain. -Denies issues with fluid intake, appetite, urination, and LBM was today. -Confirms he has all meds and taking as prescribed. -HFU with Dr Leticia Salazar / GI doctor on 3/20. From CDC: Are you at higher risk for severe illness? Wash your hands often. Avoid close contact (6 feet, which is about two arm lengths) w/people who are sick. Put distance between yourself and other people if COVID-19 is spreading in your community. Clean and disinfect frequently touched surfaces. Avoid all cruise travel and non-essential air travel. Call your healthcare professional if you have concerns about COVID-19 and your underlying condition or if you are sick. Pt will take all meds as prescribed and schedule / keep doctor's appt. [facility] provided education on s/s that require medical attention and when to seek medical attention. [facility] advised Pt of use urgent care or physician's 24 hr access line if assistance is needed after hours or on the weekend. Patient denies any needs or concerns and is agreeable with additional calls.     Addressed changes since last contact:  medications- no changes Discussed follow-up appointments. If no appointment was previously scheduled, appointment scheduling offered: Yes     Is follow up appointment scheduled within 7 days of discharge? No    Care Transitions 24 Hour Call    Do you have any ongoing symptoms?: No  Do you have all of your prescriptions and are they filled?: Yes  Were you discharged with any Home Care or Post Acute Services: No  Do you have support at home?: Partner/Spouse/SO  Are you an active caregiver in your home?: No  Care Transitions Interventions         Non-[facility] follow up appointment(s):    Care Transition Nurse reviewed discharge instructions, medical action plan and red flags with patient and discussed any barriers to care and/or understanding of plan of care after discharge. Discussed appropriate site of care based on symptoms and resources available to patient including:    Specialist  After hours contact number  Benefits related nurse triage line  Urgent care clinics  [facility] 24/7  When to call [address] [place] [address] for reactivation or family member permission [phone]. The patient agrees to contact the PCP office for questions related to their healthcare.     Advance Care Planning: Does patient have an Advance Directive: patient declined education    Patients top risk factors for readmission:   functional cognitive ability  lack of knowledge about disease  medical condition  medication management  multiple health system providers    Interventions to address risk factors:  Education of patient/family/caregiver/guardian to support self-management  Assessment and support for treatment adherence and medication management       Care Transitions Subsequent and Final Call    Subsequent and Final Calls  Do you have any ongoing symptoms?: No  Have your medications changed?: No  Do you have any questions related to your medications?: No  Do you currently have any active services?: No  Do you have any needs or concerns that I can assist you with?: No  Care Transitions Interventions  Other Interventions:              Care Transition Nurse provided contact information for future needs. Plan for f/u call in 7-10 days based on severity of symptoms and risk factors. Plan for next call:   symptom management  self-management  follow up appointment  medication management    Future Appointments   Date Time Provider Monisha Yeung   5/3/2023  1:30 PM [facility]       [facility] [place].  ADA EliasN, RN  Care Transition Coordinator  Contact Number:  [phone]

## 2023-03-17 ENCOUNTER — CARE COORDINATION (OUTPATIENT)
Dept: CASE MANAGEMENT | Age: 78
End: 2023-03-17

## 2023-03-17 NOTE — CARE COORDINATION
[facility] Care Transitions Follow Up Call    Care Transition Nurse contacted the patient by telephone to perform post hospital discharge assessment. Verified name with patient as identifier. Provided introduction to self, and explanation of the Care Transition Nurse role. Patient: Lyssa Fuller Patient :  1945  MRN: [id number]  Reason for Admission:  sepsis / abdominal pain  Discharge Date:    RARS: 13    Challenges to be reviewed by the provider   Additional needs identified to be addressed with provider:     no    AMB CC Provider Discharge Needs:  none         Method of communication with provider : none      SUMMARY  CTN spoke to the Pt who reports the following:    -\"Doing good\" and denies c/o nausea, vomiting, and abdominal pain. -Denies fever, chills, cough, congestion, SOB / DB, and chest pain. -Denies issues with fluid intake, appetite, urination, and bowel habits.   -Confirms he has all meds and taking as prescribed. -HFU with Dr America Hammonds, GI, 3/20.  -HFU with PCP completed . From CDC: Are you at higher risk for severe illness? Wash your hands often. Avoid close contact (6 feet, which is about two arm lengths) w/people who are sick. Put distance between yourself and other people if COVID-19 is spreading in your community. Clean and disinfect frequently touched surfaces. Avoid all cruise travel and non-essential air travel. Call your healthcare professional if you have concerns about COVID-19 and your underlying condition or if you are sick. Pt will take all meds as prescribed and schedule / keep doctor's appt. [facility] provided education on s/s that require medical attention and when to seek medical attention. [facility] advised Pt of use urgent care or physician's 24 hr access line if assistance is needed after hours or on the weekend. Patient denies any needs or concerns. Addressed changes since last contact:  medications-no changes     Discussed follow-up appointments.  If no appointment was previously scheduled, appointment scheduling offered: Yes      Is follow up appointment scheduled within 7 days of discharge? No    Care Transitions 24 Hour Call    Do you have any ongoing symptoms?: No  Do you have all of your prescriptions and are they filled?: Yes  Were you discharged with any Home Care or Post Acute Services: No  Do you have support at home?: Partner/Spouse/SO  Are you an active caregiver in your home?: No  Care Transitions Interventions         Non-[facility] follow up appointment(s):    Care Transition Nurse reviewed discharge instructions, medical action plan and red flags with patient and discussed any barriers to care and/or understanding of plan of care after discharge. Discussed appropriate site of care based on symptoms and resources available to patient including:    Specialist  After hours contact number  Benefits related nurse triage line  Urgent care clinics  [facility] 24/7  When to call [address] [place] [address] for reactivation or family member permission [phone]. The patient agrees to contact the PCP office for questions related to their healthcare. Interventions to address risk factors:  Education of patient/family/caregiver/guardian to support self-management  Assessment and support for treatment adherence and medication management       Care Transitions Subsequent and Final Call    Subsequent and Final Calls  Do you have any ongoing symptoms?: No  Have your medications changed?: No  Do you have any questions related to your medications?: No  Do you currently have any active services?: No  Do you have any needs or concerns that I can assist you with?: No  Care Transitions Interventions  Other Interventions:              Care Transition Nurse provided contact information for future needs.     Plan for f/u call in -  No further follow-up call indicated    Future Appointments   Date Time Provider Monisha Yeung   5/3/2023  1:30 PM MD [facility]       Aisha Lynn.  ADA BanuelosN, RN  Care Transition Coordinator  Contact Number:  [phone]

## 2023-03-27 LAB
ALBUMIN SERPL-MCNC: 4.2 G/DL (ref 3.4–5)
ALP SERPL-CCNC: 116 U/L (ref 40–129)
ALT SERPL-CCNC: 18 U/L (ref 10–40)
AST SERPL-CCNC: 20 U/L (ref 15–37)
BILIRUB DIRECT SERPL-MCNC: <0.2 MG/DL (ref 0–0.3)
BILIRUB INDIRECT SERPL-MCNC: NORMAL MG/DL (ref 0–1)
BILIRUB SERPL-MCNC: 0.3 MG/DL (ref 0–1)
DEPRECATED RDW RBC AUTO: 13.8 % (ref 12.4–15.4)
HCT VFR BLD AUTO: 38.3 % (ref 40.5–52.5)
HGB BLD-MCNC: 12.7 G/DL (ref 13.5–17.5)
MCH RBC QN AUTO: 31.4 PG (ref 26–34)
MCHC RBC AUTO-ENTMCNC: 33.3 G/DL (ref 31–36)
MCV RBC AUTO: 94.3 FL (ref 80–100)
PLATELET # BLD AUTO: 232 K/UL (ref 135–450)
PMV BLD AUTO: 9.4 FL (ref 5–10.5)
PROT SERPL-MCNC: 6.5 G/DL (ref 6.4–8.2)
RBC # BLD AUTO: 4.06 M/UL (ref 4.2–5.9)
WBC # BLD AUTO: 5.2 K/UL (ref 4–11)

## 2023-03-28 ENCOUNTER — TELEPHONE (OUTPATIENT)
Dept: INTERNAL MEDICINE CLINIC | Age: 78
End: 2023-03-28

## 2023-04-06 DIAGNOSIS — E11.9 TYPE 2 DIABETES MELLITUS WITHOUT COMPLICATION, WITHOUT LONG-TERM CURRENT USE OF INSULIN (HCC): ICD-10-CM

## 2023-04-06 NOTE — TELEPHONE ENCOUNTER
Refill-       metFORMIN (GLUCOPHAGE) 1000 MG tablet      90 day supply     [address] [facility] [phone] Josiah Gilford [phone]      Please advise

## 2023-04-17 ENCOUNTER — TELEPHONE (OUTPATIENT)
Dept: INTERNAL MEDICINE CLINIC | Age: 78
End: 2023-04-17

## 2023-04-17 NOTE — TELEPHONE ENCOUNTER
Pt will be having a colonoscopy on may 2nd. Pt is on aspirin and metformin. The office doing the colonoscopy told them if he is on blood thinners to call their primary for instructions. Please advise.

## 2023-05-02 ENCOUNTER — HOSPITAL ENCOUNTER (OUTPATIENT)
Age: 78
Setting detail: OUTPATIENT SURGERY
Discharge: HOME | End: 2023-05-02
Attending: INTERNAL MEDICINE | Admitting: INTERNAL MEDICINE
Payer: MEDICARE

## 2023-05-02 ENCOUNTER — ANESTHESIA (OUTPATIENT)
Dept: ENDOSCOPY | Age: 78
End: 2023-05-02
Payer: MEDICARE

## 2023-05-02 ENCOUNTER — ANESTHESIA EVENT (OUTPATIENT)
Dept: ENDOSCOPY | Age: 78
End: 2023-05-02
Payer: MEDICARE

## 2023-05-02 VITALS
SYSTOLIC BLOOD PRESSURE: 133 MMHG | DIASTOLIC BLOOD PRESSURE: 74 MMHG | RESPIRATION RATE: 17 BRPM | TEMPERATURE: 97.4 F | WEIGHT: 180 LBS | HEIGHT: 72 IN | HEART RATE: 67 BPM | BODY MASS INDEX: 24.38 KG/M2 | OXYGEN SATURATION: 98 %

## 2023-05-02 DIAGNOSIS — D63.8 ANEMIA DUE TO CHRONIC ILLNESS: ICD-10-CM

## 2023-05-02 DIAGNOSIS — D12.6 COLON ADENOMAS: ICD-10-CM

## 2023-05-02 LAB
GLUCOSE BLD-MCNC: 131 MG/DL (ref 70–99)
PERFORMED ON: ABNORMAL

## 2023-05-02 PROCEDURE — 7100000010 HC PHASE II RECOVERY - FIRST 15 MIN: Performed by: INTERNAL MEDICINE

## 2023-05-02 PROCEDURE — 3609010600 HC COLONOSCOPY POLYPECTOMY SNARE/COLD BIOPSY: Performed by: INTERNAL MEDICINE

## 2023-05-02 PROCEDURE — 7100000011 HC PHASE II RECOVERY - ADDTL 15 MIN: Performed by: INTERNAL MEDICINE

## 2023-05-02 PROCEDURE — 88305 TISSUE EXAM BY PATHOLOGIST: CPT

## 2023-05-02 PROCEDURE — 2709999900 HC NON-CHARGEABLE SUPPLY: Performed by: INTERNAL MEDICINE

## 2023-05-02 PROCEDURE — 2580000003 HC RX 258: Performed by: FAMILY MEDICINE

## 2023-05-02 PROCEDURE — 3700000000 HC ANESTHESIA ATTENDED CARE: Performed by: INTERNAL MEDICINE

## 2023-05-02 PROCEDURE — 2580000003 HC RX 258: Performed by: NURSE ANESTHETIST, CERTIFIED REGISTERED

## 2023-05-02 PROCEDURE — 2500000003 HC RX 250 WO HCPCS: Performed by: NURSE ANESTHETIST, CERTIFIED REGISTERED

## 2023-05-02 PROCEDURE — 6360000002 HC RX W HCPCS: Performed by: NURSE ANESTHETIST, CERTIFIED REGISTERED

## 2023-05-02 PROCEDURE — 3700000001 HC ADD 15 MINUTES (ANESTHESIA): Performed by: INTERNAL MEDICINE

## 2023-05-02 RX ORDER — PROPOFOL 10 MG/ML
INJECTION, EMULSION INTRAVENOUS PRN
Status: DISCONTINUED | OUTPATIENT
Start: 2023-05-02 | End: 2023-05-02 | Stop reason: SDUPTHER

## 2023-05-02 RX ORDER — SODIUM CHLORIDE, SODIUM LACTATE, POTASSIUM CHLORIDE, CALCIUM CHLORIDE 600; 310; 30; 20 MG/100ML; MG/100ML; MG/100ML; MG/100ML
INJECTION, SOLUTION INTRAVENOUS CONTINUOUS
Status: DISCONTINUED | OUTPATIENT
Start: 2023-05-02 | End: 2023-05-02 | Stop reason: HOSPADM

## 2023-05-02 RX ORDER — LIDOCAINE HYDROCHLORIDE 20 MG/ML
INJECTION, SOLUTION EPIDURAL; INFILTRATION; INTRACAUDAL; PERINEURAL PRN
Status: DISCONTINUED | OUTPATIENT
Start: 2023-05-02 | End: 2023-05-02 | Stop reason: SDUPTHER

## 2023-05-02 RX ORDER — SODIUM CHLORIDE, SODIUM LACTATE, POTASSIUM CHLORIDE, CALCIUM CHLORIDE 600; 310; 30; 20 MG/100ML; MG/100ML; MG/100ML; MG/100ML
INJECTION, SOLUTION INTRAVENOUS CONTINUOUS PRN
Status: DISCONTINUED | OUTPATIENT
Start: 2023-05-02 | End: 2023-05-02 | Stop reason: SDUPTHER

## 2023-05-02 RX ORDER — PROPOFOL 10 MG/ML
INJECTION, EMULSION INTRAVENOUS CONTINUOUS PRN
Status: DISCONTINUED | OUTPATIENT
Start: 2023-05-02 | End: 2023-05-02 | Stop reason: SDUPTHER

## 2023-05-02 RX ADMIN — PROPOFOL 20 MG: 10 INJECTION, EMULSION INTRAVENOUS at 11:09

## 2023-05-02 RX ADMIN — LIDOCAINE HYDROCHLORIDE 100 MG: 20 INJECTION, SOLUTION EPIDURAL; INFILTRATION; INTRACAUDAL; PERINEURAL at 11:07

## 2023-05-02 RX ADMIN — SODIUM CHLORIDE, POTASSIUM CHLORIDE, SODIUM LACTATE AND CALCIUM CHLORIDE: 600; 310; 30; 20 INJECTION, SOLUTION INTRAVENOUS at 10:39

## 2023-05-02 RX ADMIN — PROPOFOL 150 MCG/KG/MIN: 10 INJECTION, EMULSION INTRAVENOUS at 11:07

## 2023-05-02 RX ADMIN — SODIUM CHLORIDE, SODIUM LACTATE, POTASSIUM CHLORIDE, AND CALCIUM CHLORIDE: .6; .31; .03; .02 INJECTION, SOLUTION INTRAVENOUS at 11:04

## 2023-05-02 RX ADMIN — PROPOFOL 50 MG: 10 INJECTION, EMULSION INTRAVENOUS at 11:07

## 2023-05-02 ASSESSMENT — PAIN SCALES - WONG BAKER: WONGBAKER_NUMERICALRESPONSE: 0

## 2023-05-02 ASSESSMENT — PAIN - FUNCTIONAL ASSESSMENT: PAIN_FUNCTIONAL_ASSESSMENT: 0-10

## 2023-05-02 ASSESSMENT — PAIN SCALES - GENERAL: PAINLEVEL_OUTOF10: 0

## 2023-05-02 NOTE — PROGRESS NOTES
Ambulatory Surgery/Procedure Discharge Note    Vitals:    05/02/23 1138   BP: 117/63   Pulse: 70   Resp: 18   Temp: 97.4 °F (36.3 °C)   SpO2: 97%     Patient arrived to Phase II recovery drowsy and Oriented x4. Vitals stable. Patient denies pain. No nausea or vomiting. Spouse at bedside. Discharge instructions reviewed with patient and spouse. Both verbalize understanding. In: 600 [I.V.:600]  Out: -     Restroom use offered before discharge. Yes    Pain assessment:  none  Pain Level: 0        Patient discharged to home/self care. Patient discharged via wheel chair home with spouse.       5/2/2023 11:50 AM

## 2023-05-02 NOTE — H&P
Pre-operative History and Physical    Patient: Wendell Babinski  : 1945     History Obtained From:  patient, electronic medical record    HISTORY OF PRESENT ILLNESS:    The patient is a 66 y.o. male who presents for a colonoscopy for surveillance. Past Medical History:        Diagnosis Date    CAD (coronary artery disease)     Diabetes mellitus ([facility]     Hyperlipidemia     Hypertension     Lower GI bleed     Myocardial infarction ([facility]     Sensorineural hearing loss of right ear     Shingles      Past Surgical History:        Procedure Laterality Date    COLONOSCOPY      CORONARY ANGIOPLASTY WITH STENT PLACEMENT      ERCP N/A 2023    ERCP SPHINCTER/PAPILLOTOMY performed by David Salvador MD at [facility] ENDOSCOPY    ERCP N/A 2023    ERCP STONE REMOVAL performed by David Salvador MD at [address] [place] [address]      received clipping for ania rice tear     Medications Prior to Admission:   No current facility-administered medications on file prior to encounter. Current Outpatient Medications on File Prior to Encounter   Medication Sig Dispense Refill    tamsulosin (FLOMAX) 0.4 MG capsule Take 1 capsule by mouth daily 30 capsule 5    metoprolol tartrate (LOPRESSOR) 25 MG tablet TAKE 1/2 TABLET TWICE DAILY 90 tablet 3    atorvastatin (LIPITOR) 80 MG tablet TAKE 1 TABLET EVERY DAY 90 tablet 3    ramipril (ALTACE) 5 MG capsule TAKE 1 CAPSULE EVERY DAY 90 capsule 3    nitroGLYCERIN (NITROSTAT) 0.4 MG SL tablet Place 1 tablet under the tongue every 5 minutes as needed (PRN) 25 tablet 5    esomeprazole (NEXIUM) 20 MG delayed release capsule Take 1 capsule by mouth every morning (before breakfast)      aspirin 81 MG EC tablet Take 1 tablet by mouth daily Take 1/2 tablet twice daily       Allergies:  Codeine and Sulfa antibiotics    History of allergic reaction to anesthesia:  No    Social History:   TOBACCO:   reports that he has never smoked.  He has quit using smokeless

## 2023-05-02 NOTE — ANESTHESIA PRE PROCEDURE
Results   Component Value Date/Time    PHART 7.28 06/12/2015 07:08 AM    PO2ART 365 06/12/2015 07:08 AM    RKG5JUD 44 06/12/2015 07:08 AM    UTJ7CQF 20 06/12/2015 07:08 AM    BEART -6.0 06/12/2015 07:08 AM    Z0HFXJPD 100 06/12/2015 07:08 AM        Type & Screen (If Applicable):  No results found for: LABABO, LABRH    Drug/Infectious Status (If Applicable):  No results found for: HIV, HEPCAB    COVID-19 Screening (If Applicable):   Lab Results   Component Value Date/Time    COVID19 NOT DETECTED 02/08/2023 06:00 AM    COVID19 NOT DETECTED 12/15/2020 08:48 PM    COVID19 DETECTED 11/24/2020 09:06 AM           Anesthesia Evaluation  Patient summary reviewed and Nursing notes reviewed no history of anesthetic complications:   Airway: Mallampati: II  TM distance: >3 FB   Neck ROM: full  Mouth opening: > = 3 FB   Dental: normal exam         Pulmonary:Negative Pulmonary ROS and normal exam  breath sounds clear to auscultation                             Cardiovascular:  Exercise tolerance: good (>4 METS),   (+) hypertension:, past MI:, CAD:,     (-)  angina, orthopnea, PND and  TYLER      Rhythm: regular  Rate: normal                    Neuro/Psych:   (+) depression/anxiety             GI/Hepatic/Renal:   (+) liver disease:, bowel prep,           Endo/Other: Negative Endo/Other ROS                    Abdominal:       Abdomen: soft. Vascular: negative vascular ROS. Other Findings:             Anesthesia Plan      MAC     ASA 2       Induction: intravenous. Anesthetic plan and risks discussed with patient. Plan discussed with CRNA.     Attending anesthesiologist reviewed and agrees with Preprocedure content                Serafin Pham DO   5/2/2023

## 2023-05-02 NOTE — ANESTHESIA POSTPROCEDURE EVALUATION
Department of Anesthesiology  Postprocedure Note    Patient: Rudy Goldstein  MRN: [id number]  YOB: 1945  Date of evaluation: 5/2/2023      Procedure Summary     Date: 05/02/23 Room / Location: [facility]    Anesthesia Start: 1104 Anesthesia Stop: 1134    Procedure: COLONOSCOPY POLYPECTOMY SNARE/COLD BIOPSY Diagnosis:       Colon adenomas      Anemia due to chronic illness      (Colon adenomas [D12.6])      (Anemia due to chronic illness [D63.8])    Surgeons: Bimal Mckinley MD Responsible Provider: Edmundo Morales DO    Anesthesia Type: MAC ASA Status: 2          Anesthesia Type: No value filed.     Gale Phase I: Gale Score: 9    Gale Phase II: Gale Score: 10      Anesthesia Post Evaluation    Patient location during evaluation: PACU  Patient participation: complete - patient participated  Level of consciousness: awake and alert  Airway patency: patent  Nausea & Vomiting: no nausea and no vomiting  Cardiovascular status: blood pressure returned to baseline  Respiratory status: acceptable  Hydration status: euvolemic

## 2023-05-02 NOTE — PROCEDURES
The scope was then withdrawn (>6minutes) with close inspection of the mucosa in a circumferential manor. Right colon visualized twice in the anteflexed position. 11 polyps 1 to 10mm from cecum, ascending, transverse, descending, sigmoid, and rectum removed with cold snare. Retroflexed views of the rectum show hemorrhoids. No immediate complications. The preparation was good. Estimated blood loss none    Impression:  11 polyps  Plan:  The patient is aware it is their responsibility to call for biopsy results in 7 days. Repeat colonoscopy in 1 year.

## 2023-05-02 NOTE — DISCHARGE INSTRUCTIONS
Impression:  11 polyps  Plan:  The patient is aware it is their responsibility to call for biopsy results in 7 days. Repeat colonoscopy in 1 year. ENDOSCOPY DISCHARGE INSTRUCTIONS:    Call the physician that did your procedure for any questions or concern:    [facility]: [phone]  DR. COLBY PENA      ACTIVITY:    There are potential side effects to the medications used for sedation and anesthesia during your procedure. These include:  Dizziness or light-headedness, confusion or memory loss, delayed reaction times, loss of coordination, nausea and vomiting. Because of your increased risk for injury, we ask that you observe the following precautions: For the next 24 hours,  DO NOT operate an automobile, bicycle, motorcycle, , power tools or large equipment of any kind. Do not drink alcohol, sign any legal documents or make any legal decisions for 24 hours. Do not bend your head over lower than your heart. DO sit on the side of bed/couch awhile before getting up. Plan on bedrest or quiet relaxation today. You may resume normal activities in 24 hours. DIET:    Your first meal today should be light, avoiding spicy and fatty foods. If you tolerate this first meal, then you may advance to your regular diet unless otherwise advised by your physician. NORMAL SYMPTOMS:  -Mild sore throat if youve had an EGD   -Gaseous discomfort    NOTIFY YOUR PHYSICIAN IF THESE SYMPTOMS OCCUR:  1. Fever (greater than 100)  5. Increased abdominal bloating  2. Severe pain    6. Excessive bleeding  3. Nausea and vomiting  7. Chest pain                                                                    4. Chills    8. Shortness of breath    ADDITIONAL INSTRUCTIONS:    Biopsy results: Call [address],[facility] for biopsy results in 1 week    Educational Information:          Please review these discharge instructions this evening or tomorrow for  information you may have forgotten.             We want to thank

## 2023-05-11 ENCOUNTER — OFFICE VISIT (OUTPATIENT)
Dept: CARDIOLOGY CLINIC | Age: 78
End: 2023-05-11
Payer: MEDICARE

## 2023-05-11 VITALS
SYSTOLIC BLOOD PRESSURE: 132 MMHG | DIASTOLIC BLOOD PRESSURE: 58 MMHG | WEIGHT: 189.2 LBS | OXYGEN SATURATION: 95 % | BODY MASS INDEX: 25.66 KG/M2 | HEART RATE: 80 BPM

## 2023-05-11 DIAGNOSIS — I25.2 MI, OLD: ICD-10-CM

## 2023-05-11 DIAGNOSIS — I10 ESSENTIAL HYPERTENSION: ICD-10-CM

## 2023-05-11 DIAGNOSIS — I25.10 CAD IN NATIVE ARTERY: Primary | ICD-10-CM

## 2023-05-11 PROCEDURE — 1036F TOBACCO NON-USER: CPT | Performed by: INTERNAL MEDICINE

## 2023-05-11 PROCEDURE — 1123F ACP DISCUSS/DSCN MKR DOCD: CPT | Performed by: INTERNAL MEDICINE

## 2023-05-11 PROCEDURE — 3078F DIAST BP <80 MM HG: CPT | Performed by: INTERNAL MEDICINE

## 2023-05-11 PROCEDURE — G8427 DOCREV CUR MEDS BY ELIG CLIN: HCPCS | Performed by: INTERNAL MEDICINE

## 2023-05-11 PROCEDURE — 99214 OFFICE O/P EST MOD 30 MIN: CPT | Performed by: INTERNAL MEDICINE

## 2023-05-11 PROCEDURE — 3075F SYST BP GE 130 - 139MM HG: CPT | Performed by: INTERNAL MEDICINE

## 2023-05-11 PROCEDURE — G8417 CALC BMI ABV UP PARAM F/U: HCPCS | Performed by: INTERNAL MEDICINE

## 2023-05-11 NOTE — PROGRESS NOTES
Subjective:      Patient ID: Margareth Goff is a 66 y.o. male. RODERICK Allen is here today for follow up CAD/HTN/MI. Continues to walk 5x per wk. Very active. No angina. No sob. No pnd or orthopnea. No edema. Wt stable. No tachy/syncope. BP good at home. Feeling good       Past Medical History:   Diagnosis Date    CAD (coronary artery disease)     Diabetes mellitus ([facility]     Hyperlipidemia     Hypertension     Lower GI bleed     Myocardial infarction [facility])     Sensorineural hearing loss of right ear     Shingles      Past Surgical History:   Procedure Laterality Date    COLONOSCOPY      COLONOSCOPY N/A 5/2/2023    COLONOSCOPY POLYPECTOMY SNARE/COLD BIOPSY performed by Nikki Contreras MD at [facility]      ERCP N/A 02/09/2023    ERCP SPHINCTER/PAPILLOTOMY performed by Renzo Walker MD at [facility]    ERCP N/A 02/09/2023    ERCP STONE REMOVAL performed by Renzo Walker MD at [facility]      received clipping for ania rice tear       Social History     Socioeconomic History    Marital status:      Spouse name: Not on file    Number of children: 2    Years of education: Not on file    Highest education level: Not on file   Occupational History    Occupation:      Comment: retired   Tobacco Use    Smoking status: Never    Smokeless tobacco: Former     Types: Chew   Substance and Sexual Activity    Alcohol use: No    Drug use: No    Sexual activity: Not on file   Other Topics Concern    Not on file   Social History Narrative    Not on file     Social Determinants of Health     Financial Resource Strain: Low Risk     Difficulty of Paying Living Expenses: Not hard at all   Food Insecurity: No Food Insecurity    Worried About Running Out of Food in the Last Year: Never true    [address] in the Last Year: Never true   Transportation Needs: Unknown    Lack of Transportation (Medical):  Not on

## 2023-05-16 RX ORDER — RAMIPRIL 5 MG/1
CAPSULE ORAL
Qty: 90 CAPSULE | Refills: 3 | Status: SHIPPED | OUTPATIENT
Start: 2023-05-16

## 2023-07-22 RX ORDER — NITROGLYCERIN 0.4 MG/1
0.4 TABLET SUBLINGUAL EVERY 5 MIN PRN
Qty: 25 TABLET | Refills: 5 | Status: SHIPPED | OUTPATIENT
Start: 2023-07-22

## 2023-07-24 RX ORDER — NITROGLYCERIN 0.4 MG/1
TABLET SUBLINGUAL
Qty: 75 TABLET | Refills: 1 | Status: SHIPPED | OUTPATIENT
Start: 2023-07-24

## 2023-08-01 ENCOUNTER — TELEPHONE (OUTPATIENT)
Dept: INTERNAL MEDICINE CLINIC | Age: 78
End: 2023-08-01

## 2023-08-01 NOTE — TELEPHONE ENCOUNTER
Offered an appointment for Friday with Dr. Paula Smith  but refused that appt  Asked if Dr. Adilia Rios had an earlier appt  Scheduled for Thursday at 1:30 pm  If worse will go to the ED

## 2023-08-01 NOTE — TELEPHONE ENCOUNTER
Pt's wife called in and stated that she is trying to get an appt with Dr. Malcolm Jerez for pt. Pt fell today and has been having memory issues. Pt also wanted labs ordered for pt. Pt would like to be seen ASAP. Please call and advise.

## 2023-08-03 ENCOUNTER — OFFICE VISIT (OUTPATIENT)
Dept: INTERNAL MEDICINE CLINIC | Age: 78
End: 2023-08-03

## 2023-08-03 VITALS
HEART RATE: 74 BPM | TEMPERATURE: 98.5 F | WEIGHT: 194 LBS | HEIGHT: 72 IN | BODY MASS INDEX: 26.28 KG/M2 | DIASTOLIC BLOOD PRESSURE: 70 MMHG | OXYGEN SATURATION: 94 % | SYSTOLIC BLOOD PRESSURE: 122 MMHG

## 2023-08-03 DIAGNOSIS — I25.119 ATHEROSCLEROSIS OF NATIVE CORONARY ARTERY OF NATIVE HEART WITH ANGINA PECTORIS (HCC): ICD-10-CM

## 2023-08-03 DIAGNOSIS — I20.9 ANGINA PECTORIS, UNSPECIFIED (HCC): ICD-10-CM

## 2023-08-03 DIAGNOSIS — E11.9 TYPE 2 DIABETES MELLITUS WITHOUT COMPLICATION, WITHOUT LONG-TERM CURRENT USE OF INSULIN (HCC): Primary | ICD-10-CM

## 2023-08-03 DIAGNOSIS — G47.09 OTHER INSOMNIA: ICD-10-CM

## 2023-08-03 DIAGNOSIS — D50.9 IRON DEFICIENCY ANEMIA, UNSPECIFIED IRON DEFICIENCY ANEMIA TYPE: ICD-10-CM

## 2023-08-03 RX ORDER — VITAMIN B COMPLEX
1 CAPSULE ORAL DAILY
COMMUNITY

## 2023-08-03 ASSESSMENT — ENCOUNTER SYMPTOMS
SORE THROAT: 0
DIARRHEA: 0
BLOOD IN STOOL: 0
CONSTIPATION: 0
SINUS PAIN: 0
SINUS PRESSURE: 0
SHORTNESS OF BREATH: 0
BACK PAIN: 0
CHEST TIGHTNESS: 0
ABDOMINAL PAIN: 0
VOICE CHANGE: 0
COUGH: 0
TROUBLE SWALLOWING: 0
VOMITING: 0
ABDOMINAL DISTENTION: 0
WHEEZING: 0
NAUSEA: 0

## 2023-08-03 NOTE — PROGRESS NOTES
Normal breath sounds. No wheezing or rales. Abdominal:      Palpations: Abdomen is soft. Tenderness: There is no abdominal tenderness. There is no rebound. Musculoskeletal:         General: No signs of injury. Normal range of motion. Cervical back: Normal range of motion and neck supple. Skin:     General: Skin is warm and dry. Coloration: Skin is not jaundiced. Neurological:      General: No focal deficit present. Mental Status: He is alert and oriented to person, place, and time. Psychiatric:         Behavior: Behavior normal.         Thought Content: Thought content normal.         Judgment: Judgment normal.       ASSESSMENT/ PLAN:  1. Other insomnia  -Counseled sleep hygiene. Increase melatonin to 10-20.    2. Type 2 diabetes mellitus without complication, without long-term current use of insulin ([facility])  - Comprehensive Metabolic Panel; Future  - Hemoglobin A1C; Future    3. Angina pectoris, unspecified  - Lipid, Fasting; Future    4. Atherosclerosis of native coronary artery of native heart with angina pectoris ([address])  -Lipid profile. 5. Iron deficiency anemia, unspecified iron deficiency anemia type  - CBC; Future  - Iron and TIBC; Future       Orders Placed This Encounter   Procedures    CBC    Comprehensive Metabolic Panel    Hemoglobin A1C    Iron and TIBC    Lipid, Fasting      No orders of the defined types were placed in this encounter. There are no discontinued medications. Return in about 1 month (around 9/3/2023) for AWV. Viktor Byrd MD    This dictation was generated by voice recognition computer software. Although all attempts are made to edit the dictation for accuracy, there may be errors in the transcription that are not intended.

## 2023-08-05 DIAGNOSIS — E11.9 TYPE 2 DIABETES MELLITUS WITHOUT COMPLICATION, WITHOUT LONG-TERM CURRENT USE OF INSULIN (HCC): ICD-10-CM

## 2023-08-05 DIAGNOSIS — D50.9 IRON DEFICIENCY ANEMIA, UNSPECIFIED IRON DEFICIENCY ANEMIA TYPE: ICD-10-CM

## 2023-08-05 DIAGNOSIS — I20.9 ANGINA PECTORIS, UNSPECIFIED (HCC): ICD-10-CM

## 2023-08-05 LAB
ALBUMIN SERPL-MCNC: 4.1 G/DL (ref 3.4–5)
ALBUMIN/GLOB SERPL: 2.2 {RATIO} (ref 1.1–2.2)
ALP SERPL-CCNC: 116 U/L (ref 40–129)
ALT SERPL-CCNC: 31 U/L (ref 10–40)
ANION GAP SERPL CALCULATED.3IONS-SCNC: 12 MMOL/L (ref 3–16)
AST SERPL-CCNC: 37 U/L (ref 15–37)
BILIRUB SERPL-MCNC: 0.6 MG/DL (ref 0–1)
BUN SERPL-MCNC: 18 MG/DL (ref 7–20)
CALCIUM SERPL-MCNC: 9.3 MG/DL (ref 8.3–10.6)
CHLORIDE SERPL-SCNC: 97 MMOL/L (ref 99–110)
CHOLEST SERPL-MCNC: 146 MG/DL (ref 0–199)
CO2 SERPL-SCNC: 28 MMOL/L (ref 21–32)
CREAT SERPL-MCNC: 0.7 MG/DL (ref 0.8–1.3)
DEPRECATED RDW RBC AUTO: 14.2 % (ref 12.4–15.4)
GFR SERPLBLD CREATININE-BSD FMLA CKD-EPI: >60 ML/MIN/{1.73_M2}
GLUCOSE SERPL-MCNC: 131 MG/DL (ref 70–99)
HCT VFR BLD AUTO: 38.3 % (ref 40.5–52.5)
HDLC SERPL-MCNC: 46 MG/DL (ref 40–60)
HGB BLD-MCNC: 13 G/DL (ref 13.5–17.5)
IRON SATN MFR SERPL: 21 % (ref 20–50)
IRON SERPL-MCNC: 67 UG/DL (ref 59–158)
LDL CHOLESTEROL CALCULATED: 79 MG/DL
MCH RBC QN AUTO: 31.2 PG (ref 26–34)
MCHC RBC AUTO-ENTMCNC: 33.9 G/DL (ref 31–36)
MCV RBC AUTO: 92.2 FL (ref 80–100)
PLATELET # BLD AUTO: 275 K/UL (ref 135–450)
PMV BLD AUTO: 9 FL (ref 5–10.5)
POTASSIUM SERPL-SCNC: 4.8 MMOL/L (ref 3.5–5.1)
PROT SERPL-MCNC: 6 G/DL (ref 6.4–8.2)
RBC # BLD AUTO: 4.16 M/UL (ref 4.2–5.9)
SODIUM SERPL-SCNC: 137 MMOL/L (ref 136–145)
TIBC SERPL-MCNC: 312 UG/DL (ref 260–445)
TRIGL SERPL-MCNC: 105 MG/DL (ref 0–150)
VLDLC SERPL CALC-MCNC: 21 MG/DL
WBC # BLD AUTO: 6.6 K/UL (ref 4–11)

## 2023-08-06 LAB
EST. AVERAGE GLUCOSE BLD GHB EST-MCNC: 139.9 MG/DL
HBA1C MFR BLD: 6.5 %

## 2023-08-21 ENCOUNTER — OFFICE VISIT (OUTPATIENT)
Dept: INTERNAL MEDICINE CLINIC | Age: 78
End: 2023-08-21

## 2023-08-21 VITALS
WEIGHT: 193 LBS | BODY MASS INDEX: 26.14 KG/M2 | HEIGHT: 72 IN | SYSTOLIC BLOOD PRESSURE: 178 MMHG | DIASTOLIC BLOOD PRESSURE: 65 MMHG | HEART RATE: 78 BPM

## 2023-08-21 DIAGNOSIS — R35.0 URINARY FREQUENCY: ICD-10-CM

## 2023-08-21 DIAGNOSIS — R09.89 BILATERAL CAROTID BRUITS: ICD-10-CM

## 2023-08-21 DIAGNOSIS — Z12.5 PROSTATE CANCER SCREENING: ICD-10-CM

## 2023-08-21 DIAGNOSIS — G47.01 INSOMNIA DUE TO MEDICAL CONDITION: Primary | ICD-10-CM

## 2023-08-21 DIAGNOSIS — R68.89 FORGETFULNESS: ICD-10-CM

## 2023-08-21 DIAGNOSIS — R05.8 DRY COUGH: ICD-10-CM

## 2023-08-21 DIAGNOSIS — I10 ESSENTIAL HYPERTENSION: ICD-10-CM

## 2023-08-21 LAB
BILIRUBIN, POC: NORMAL
BLOOD URINE, POC: NORMAL
CLARITY, POC: CLEAR
COLOR, POC: YELLOW
GLUCOSE URINE, POC: NORMAL
KETONES, POC: NORMAL
LEUKOCYTE EST, POC: NORMAL
NITRITE, POC: NORMAL
PH, POC: 5
PROTEIN, POC: NORMAL
PSA SERPL DL<=0.01 NG/ML-MCNC: 0.53 NG/ML (ref 0–4)
SPECIFIC GRAVITY, POC: 1.01
TSH SERPL DL<=0.005 MIU/L-ACNC: 2.52 UIU/ML (ref 0.27–4.2)
UROBILINOGEN, POC: 2
VIT B12 SERPL-MCNC: 630 PG/ML (ref 211–911)

## 2023-08-21 RX ORDER — CHOLECALCIFEROL (VITAMIN D3) 125 MCG
5 CAPSULE ORAL DAILY
COMMUNITY

## 2023-08-21 RX ORDER — LOSARTAN POTASSIUM 50 MG/1
50 TABLET ORAL DAILY
Qty: 90 TABLET | Refills: 1 | Status: SHIPPED | OUTPATIENT
Start: 2023-08-21

## 2023-08-21 RX ORDER — CALCIUM CARBONATE 300MG(750)
TABLET,CHEWABLE ORAL
COMMUNITY

## 2023-08-21 NOTE — PROGRESS NOTES
General: Bowel sounds are normal. There is no distension. Palpations: Abdomen is soft. Tenderness: There is no abdominal tenderness. There is no right CVA tenderness or left CVA tenderness. Musculoskeletal:         General: No tenderness. Normal range of motion. Cervical back: Normal range of motion and neck supple. Right lower leg: No edema. Left lower leg: No edema. Lymphadenopathy:      Cervical: No cervical adenopathy. Skin:     General: Skin is warm and dry. Capillary Refill: Capillary refill takes less than 2 seconds. Findings: No erythema, lesion or rash. Neurological:      General: No focal deficit present. Mental Status: He is alert and oriented to person, place, and time. Cranial Nerves: No cranial nerve deficit. Sensory: Sensory deficit (Reduced vibration sensation over distal feet bilaterally) present. Gait: Gait normal.      Deep Tendon Reflexes: Reflexes normal.   Psychiatric:         Mood and Affect: Mood normal.       Assessment/ plan:     Dede Edmonds was seen today for insomnia and gait problem. Diagnoses and all orders for this visit:    Insomnia due to medical condition        -    Most probably, secondary to frequent awakenings due to urinary frequency/urgency        -     Continue with melatonin 5 mg nightly 1 hour before going to bed    Forgetfulness, most probably secondary to mild cognitive impairment and poor sleep quality  -     TSH; Future  -     Vitamin B12; Future    Dry cough         -    Will discontinue lisinopril    Essential hypertension  -     losartan (COZAAR) 50 MG tablet; Take 1 tablet by mouth daily    Urinary frequency  -     AFL - Teresa Vanessa MD, Urology, [facility]  -     POCT Urinalysis no Micro    Prostate cancer screening  -     PSA Screening; Future    Bilateral carotid bruits  -     VL DUP CAROTID BILATERAL;  Future        Return in about 1 month (around 9/21/2023) for Insomnia and

## 2023-08-24 ENCOUNTER — TELEPHONE (OUTPATIENT)
Dept: INTERNAL MEDICINE CLINIC | Age: 78
End: 2023-08-24

## 2023-08-29 ENCOUNTER — HOSPITAL ENCOUNTER (OUTPATIENT)
Dept: VASCULAR LAB | Age: 78
Discharge: HOME | End: 2023-08-29
Attending: HOSPITALIST
Payer: MEDICARE

## 2023-08-29 DIAGNOSIS — R09.89 BILATERAL CAROTID BRUITS: ICD-10-CM

## 2023-08-29 PROCEDURE — 93880 EXTRACRANIAL BILAT STUDY: CPT

## 2023-09-05 ENCOUNTER — TELEPHONE (OUTPATIENT)
Dept: INTERNAL MEDICINE CLINIC | Age: 78
End: 2023-09-05

## 2023-09-05 DIAGNOSIS — I65.21 STENOSIS OF RIGHT CAROTID ARTERY: Primary | ICD-10-CM

## 2023-09-05 RX ORDER — ASPIRIN 81 MG/1
81 TABLET ORAL DAILY
Qty: 90 TABLET | Refills: 1 | Status: SHIPPED | OUTPATIENT
Start: 2023-09-05

## 2023-09-05 NOTE — TELEPHONE ENCOUNTER
Pt's wife called in and would like to discuss the recent  VL DUP CAROTID BILATERAL pt has had. Pt also woke up this AM and was very confused. Pt's wife would also like a referral for a neurologist.  Please call and advise.

## 2023-09-08 ENCOUNTER — OFFICE VISIT (OUTPATIENT)
Dept: INTERNAL MEDICINE CLINIC | Age: 78
End: 2023-09-08

## 2023-09-08 VITALS
HEIGHT: 72 IN | SYSTOLIC BLOOD PRESSURE: 140 MMHG | OXYGEN SATURATION: 95 % | WEIGHT: 193 LBS | DIASTOLIC BLOOD PRESSURE: 80 MMHG | TEMPERATURE: 98.2 F | HEART RATE: 54 BPM | BODY MASS INDEX: 26.14 KG/M2

## 2023-09-08 DIAGNOSIS — M79.89 LEFT LEG SWELLING: Primary | ICD-10-CM

## 2023-09-08 NOTE — PROGRESS NOTES
Brandin Christie (:  1945) is a 66 y.o. male here for evaluation of the following chief complaint(s): Foot Swelling (Check feet and ankles)         ASSESSMENT/PLAN:  1. Left leg swelling  Assessment & Plan:   Noticing leg swelling x 2 days, significant increase in LLE circumference but denies calf tenderness   - Wells criteria 3   - Will obtain Duplex to rule out DVT. We were able to call and schedule patient an appointment on 2023  - Instructions provided in regards of worsening sx or develop PE. Patient and wife aware of when they should present to ER if indicated  Orders:  -     VL DUP LOWER EXTREMITY VENOUS LEFT; Future      Return if symptoms worsen or fail to improve, for after venous duplex of LLE . Subjective   SUBJECTIVE/OBJECTIVE:  HPI  Presented with wife with a complaint of LLE swelling for about 2 days (might be longer - patient is not sure). Initially noted slightly swelling in his right ankle. However, 2 days ago, his wife notice his left leg significantly swollen, involve his lower leg up to the knee. Denies any calf tenderness, CP, SOB, light headedness. Also complains of bruising on right forearm. He did not recall any fall but might have bump into the door. Has a hard nodule by the bruising area but no tender. This nodularity most likely due to superficial vein thrombophlebitis. Reassurance provided. Can apply ice at home.      Review of Systems:   Constitutional:  Denies fever or chills   Eyes:  Denies change in visual acuity   HENT:  Denies nasal congestion or sore throat   Respiratory:  Denies cough or shortness of breath   Cardiovascular:  Denies chest pain or edema   GI:  Denies abdominal pain, nausea, vomiting, bloody stools or diarrhea   :  Denies dysuria   Musculoskeletal:  Denies back pain or joint pain   Integument:  Denies rash   Neurologic:  Denies headache, focal weakness or sensory changes   Endocrine:  Denies polyuria or polydipsia   Lymphatic:

## 2023-09-08 NOTE — ASSESSMENT & PLAN NOTE
Noticing leg swelling x 2 days, significant increase in LLE circumference but denies calf tenderness   - Wells criteria 3   - Will obtain Duplex to rule out DVT. We were able to call and schedule patient an appointment on Tuesday 9/12/2023  - Instructions provided in regards of worsening sx or develop PE. Patient and wife aware of when they should present to ER.

## 2023-09-12 ENCOUNTER — HOSPITAL ENCOUNTER (OUTPATIENT)
Dept: VASCULAR LAB | Age: 78
Discharge: HOME | End: 2023-09-12
Payer: MEDICARE

## 2023-09-12 ENCOUNTER — OFFICE VISIT (OUTPATIENT)
Dept: INTERNAL MEDICINE CLINIC | Age: 78
End: 2023-09-12

## 2023-09-12 VITALS
BODY MASS INDEX: 26.28 KG/M2 | WEIGHT: 194 LBS | DIASTOLIC BLOOD PRESSURE: 57 MMHG | HEIGHT: 72 IN | SYSTOLIC BLOOD PRESSURE: 121 MMHG

## 2023-09-12 DIAGNOSIS — M79.89 LEFT LEG SWELLING: ICD-10-CM

## 2023-09-12 DIAGNOSIS — L03.116 CELLULITIS OF LEFT LOWER EXTREMITY: Primary | ICD-10-CM

## 2023-09-12 DIAGNOSIS — B35.1 ONYCHOMYCOSIS OF TOENAIL: ICD-10-CM

## 2023-09-12 PROCEDURE — 93971 EXTREMITY STUDY: CPT

## 2023-09-12 RX ORDER — DOXYCYCLINE HYCLATE 100 MG
100 TABLET ORAL 2 TIMES DAILY
Qty: 14 TABLET | Refills: 0 | Status: SHIPPED | OUTPATIENT
Start: 2023-09-12 | End: 2023-09-19

## 2023-09-12 NOTE — PROGRESS NOTES
Follow Up Visit Established Patient Visit    Patient:  Moises Branham                                               : 1945  Age: 66 y.o. MRN: [id number]  Date : 2023    Moises Branham is a 66 y.o. male who presents for :  Left leg swelling     He was seen in clinic 23 for left leg swelling. Venous doppler was ordered. Preliminary results of doppler done today is negative for DVT. Reports left leg swelling has become more red. Reports he has been falling. Reports dizziness when he stands up. He does not want physical therapy for now.      Chief Complaint   Patient presents with    Hypertension    Edema       Past Medical History:   Diagnosis Date    CAD (coronary artery disease)     Diabetes mellitus ([facility])     Hyperlipidemia     Hypertension     Lower GI bleed     Myocardial infarction [facility])     Sensorineural hearing loss of right ear     Shingles        Past Surgical History:   Procedure Laterality Date    COLONOSCOPY      COLONOSCOPY N/A 2023    COLONOSCOPY POLYPECTOMY SNARE/COLD BIOPSY performed by Michelle Ortiz MD at [address] [place] [address]      ERCP N/A 2023    ERCP SPHINCTER/PAPILLOTOMY performed by Junior Guzman MD at [facility] ENDOSCOPY    ERCP N/A 2023    ERCP STONE REMOVAL performed by Junior Guzman MD at [address] [place] [address] Avenue      received clipping for ania rice tear       Current Outpatient Medications   Medication Sig Dispense Refill    doxycycline hyclate (VIBRA-TABS) 100 MG tablet Take 1 tablet by mouth 2 times daily for 7 days 14 tablet 0    aspirin 81 MG EC tablet Take 1 tablet by mouth daily 90 tablet 1    Magnesium 400 MG TABS Take by mouth      melatonin 5 MG TABS tablet Take 1 tablet by mouth daily      losartan (COZAAR) 50 MG tablet Take 1 tablet by mouth daily 90 tablet 1    b complex vitamins capsule Take 1 capsule by mouth daily      nitroGLYCERIN (NITROSTAT) 0.4 MG SL tablet Place 1

## 2023-09-18 ENCOUNTER — TELEPHONE (OUTPATIENT)
Dept: INTERNAL MEDICINE CLINIC | Age: 78
End: 2023-09-18
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
## 2023-09-18 NOTE — TELEPHONE ENCOUNTER
Dr Andrés guzmán  Spoke with Daughter leg improving some   Take yogurt  Follow up appointment thursday

## 2023-09-21 ENCOUNTER — OFFICE VISIT (OUTPATIENT)
Dept: INTERNAL MEDICINE CLINIC | Age: 78
End: 2023-09-21

## 2023-09-21 VITALS
BODY MASS INDEX: 25.77 KG/M2 | HEART RATE: 68 BPM | WEIGHT: 190 LBS | OXYGEN SATURATION: 98 % | SYSTOLIC BLOOD PRESSURE: 110 MMHG | DIASTOLIC BLOOD PRESSURE: 54 MMHG

## 2023-09-21 DIAGNOSIS — R60.0 BILATERAL LEG EDEMA: ICD-10-CM

## 2023-09-21 DIAGNOSIS — L03.116 CELLULITIS OF LEFT LOWER EXTREMITY: Primary | ICD-10-CM

## 2023-09-21 DIAGNOSIS — R68.89 FORGETFULNESS: ICD-10-CM

## 2023-09-21 DIAGNOSIS — I10 ESSENTIAL HYPERTENSION: ICD-10-CM

## 2023-09-21 NOTE — PROGRESS NOTES
Normal range of motion and neck supple. Right lower leg: Edema present. Left lower leg: Edema present. Comments: The patient has 2+ pitting below the knee bilateral lower extremity edema   Lymphadenopathy:      Cervical: No cervical adenopathy. Skin:     General: Skin is warm and dry. Capillary Refill: Capillary refill takes less than 2 seconds. Findings: No erythema, lesion or rash. Comments: Left distal leg erythema has resolved with use of oral doxycycline.  + Multiple bilateral chronic toenail onychomycosis   Neurological:      General: No focal deficit present. Mental Status: He is alert and oriented to person, place, and time. Cranial Nerves: No cranial nerve deficit. Gait: Gait normal.      Deep Tendon Reflexes: Reflexes normal.   Psychiatric:         Mood and Affect: Mood normal.         Assessment/ plan:     Diagnoses and all orders for this visit:    Cellulitis of left lower extremity  Resolved    Forgetfulness  Keep scheduled appointment for neurologic evaluation with Dr. Talat Dumont    Bilateral leg edema  Strongly encouraged the patient to wear knee-high compression stockings and keep feet elevated whenever possible    Essential hypertension  Controlled; continue current medications      Return in about 3 months (around 12/21/2023), or if symptoms worsen or fail to improve.     Thea Leonard MD

## 2023-10-13 ENCOUNTER — TELEPHONE (OUTPATIENT)
Dept: INTERNAL MEDICINE CLINIC | Age: 78
End: 2023-10-13

## 2023-10-17 NOTE — TELEPHONE ENCOUNTER
Spouse calling and checking to see if pt should pt be taking Omeprazole (Prilosec) or Nexium and should he be on magnesium had recent labs from Dr. Giselle Enamorado in Sept not sure what the results are. I refreshed Care Everywhere. Ok to wait for Monday.
Take GERD medication if having symptoms  Will have to check with Dr Bharat Patel on magnesium level
WIFE CALLING BACK FOLLOWING UP ON MESSAGE SHE RECEIVED     DUSTIN TO CALL BACK
EMS

## 2023-10-20 ENCOUNTER — TELEPHONE (OUTPATIENT)
Dept: INTERNAL MEDICINE CLINIC | Age: 78
End: 2023-10-20

## 2023-10-20 NOTE — TELEPHONE ENCOUNTER
Patient's wife called in stating that the patient was seen by neurologist and they ordered a MRI and EEG. Patient will have a copay of $325. Would like to know if Dr. Nick Adamson feels that the patient needs MRI and EEG.        Pls call and advise

## 2023-10-24 ENCOUNTER — TELEPHONE (OUTPATIENT)
Dept: INTERNAL MEDICINE CLINIC | Age: 78
End: 2023-10-24

## 2023-10-24 NOTE — TELEPHONE ENCOUNTER
----- Message from Andria Merida sent at 10/23/2023 11:47 AM EDT -----  Subject: Message to Provider    QUESTIONS  Information for Provider? Patient needs to have stiches removed please   call back to assist everything was VV nothing in person or in time   ---------------------------------------------------------------------------  --------------  Seun Arteaga INFO  [phone]; OK to leave message on voicemail  ---------------------------------------------------------------------------  --------------  SCRIPT ANSWERS  Relationship to Patient? Spouse/Partner  Representative Name? Alanna Newby  Is the representative on the Communication Release of Information (GÓMEZ)   form in Epic?  Yes

## 2023-10-31 ENCOUNTER — OFFICE VISIT (OUTPATIENT)
Dept: INTERNAL MEDICINE CLINIC | Age: 78
End: 2023-10-31

## 2023-10-31 VITALS
DIASTOLIC BLOOD PRESSURE: 60 MMHG | SYSTOLIC BLOOD PRESSURE: 110 MMHG | BODY MASS INDEX: 24.79 KG/M2 | HEIGHT: 72 IN | WEIGHT: 183 LBS

## 2023-10-31 DIAGNOSIS — E11.9 TYPE 2 DIABETES MELLITUS WITHOUT COMPLICATION, WITHOUT LONG-TERM CURRENT USE OF INSULIN (HCC): ICD-10-CM

## 2023-10-31 DIAGNOSIS — I10 ESSENTIAL HYPERTENSION: ICD-10-CM

## 2023-10-31 DIAGNOSIS — Z23 NEED FOR INFLUENZA VACCINATION: ICD-10-CM
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
## 2023-10-31 NOTE — PROGRESS NOTES
Follow Up Visit Established Patient Visit    Patient:  Moises Branham                                               : 1945  Age: 66 y.o. MRN: [id number]  Date : 10/31/2023    Moises Branham is a 66 y.o. male who presents for :  follow up after fall, routine    Alphonsus Barters face forward hitting head 8-10 days ago on walking trail, possibly witnessed by bystanders. Pt reports he only remembers waking up on floor. Bystanders called [employer]. Patient reports trail has roots which caused him to fall. Infrequent history of falls at home witnessed by wife which she attributes to balance issues, none as severe as this, no hx of seizures. Denies other prodromal symptoms prior to this fall. Denies headaches, dizziness, dizziness upon standing, blurry vision. Denies dysuria, constipation, diarrhea. Reports stable diet, walks daily for exercise (typically without issue). Compliant with medications and Dr visits. Takes BP at home occasionally, gets \"normal values. \"  Colonoscopy 2023: 11 polyps removed, repeat  (Dr. Dasha Stack)  Sees [facility]; has upcoming MRI scheduled.     Chief Complaint   Patient presents with    Diabetes       Past Medical History:   Diagnosis Date    CAD (coronary artery disease)     Diabetes mellitus ([facility])     Hyperlipidemia     Hypertension     Lower GI bleed     Myocardial infarction [facility])     Sensorineural hearing loss of right ear     Shingles        Past Surgical History:   Procedure Laterality Date    COLONOSCOPY      COLONOSCOPY N/A 2023    COLONOSCOPY POLYPECTOMY SNARE/COLD BIOPSY performed by Michelle Ortiz MD at [address] [facility] [address]      ERCP N/A 2023    ERCP SPHINCTER/PAPILLOTOMY performed by Junior Guzman MD at [address] [facility]    ERCP N/A 2023    ERCP STONE REMOVAL performed by Junior Guzman MD at [address] [facility]      received clipping for ania rice tear       Current

## 2023-11-21 ENCOUNTER — OFFICE VISIT (OUTPATIENT)
Dept: CARDIOLOGY CLINIC | Age: 78
End: 2023-11-21
Payer: MEDICARE

## 2023-11-21 VITALS
HEART RATE: 64 BPM | BODY MASS INDEX: 25.36 KG/M2 | SYSTOLIC BLOOD PRESSURE: 132 MMHG | DIASTOLIC BLOOD PRESSURE: 58 MMHG | WEIGHT: 187 LBS

## 2023-11-21 DIAGNOSIS — I25.2 MI, OLD: ICD-10-CM

## 2023-11-21 DIAGNOSIS — I25.10 CAD IN NATIVE ARTERY: Primary | ICD-10-CM

## 2023-11-21 DIAGNOSIS — I10 ESSENTIAL HYPERTENSION: ICD-10-CM

## 2023-11-21 PROCEDURE — 99214 OFFICE O/P EST MOD 30 MIN: CPT | Performed by: INTERNAL MEDICINE

## 2023-11-21 PROCEDURE — G8428 CUR MEDS NOT DOCUMENT: HCPCS | Performed by: INTERNAL MEDICINE

## 2023-11-21 PROCEDURE — G8484 FLU IMMUNIZE NO ADMIN: HCPCS | Performed by: INTERNAL MEDICINE

## 2023-11-21 PROCEDURE — 1123F ACP DISCUSS/DSCN MKR DOCD: CPT | Performed by: INTERNAL MEDICINE

## 2023-11-21 PROCEDURE — 1036F TOBACCO NON-USER: CPT | Performed by: INTERNAL MEDICINE

## 2023-11-21 PROCEDURE — G8417 CALC BMI ABV UP PARAM F/U: HCPCS | Performed by: INTERNAL MEDICINE

## 2023-11-21 PROCEDURE — 3075F SYST BP GE 130 - 139MM HG: CPT | Performed by: INTERNAL MEDICINE

## 2023-11-21 PROCEDURE — 3078F DIAST BP <80 MM HG: CPT | Performed by: INTERNAL MEDICINE

## 2023-11-21 NOTE — PROGRESS NOTES
sounds are normal. No tenderness. Musculoskeletal: Normal range of motion. He exhibits no edema. Neurological: He is alert and oriented to person, place, and time. Skin: Skin is warm and dry. Psychiatric: He has a normal mood and affect. His behavior is normal. Thought content normal.       Assessment:       Diagnosis Orders   1. CAD in native artery        2. Essential hypertension        3. MI, old                    Plan:      CV stable. No chest pain. Continues to exercise with no issues. .  BP is good . No exertional sx. Will continue ASA/ramipril/lopressor/lipitor for CAD/HTN. Reviewed previous cardiac records and testing including myoview 11/14. Continue to monitor. Follow up 6 months. Lipids per PCP. Continues wishes to be followed clinically.

## 2023-11-29 RX ORDER — ATORVASTATIN CALCIUM 80 MG/1
TABLET, FILM COATED ORAL
Qty: 90 TABLET | Refills: 3 | Status: SHIPPED | OUTPATIENT
Start: 2023-11-29

## 2024-01-11 DIAGNOSIS — E11.9 TYPE 2 DIABETES MELLITUS WITHOUT COMPLICATION, WITHOUT LONG-TERM CURRENT USE OF INSULIN (HCC): ICD-10-CM

## 2024-01-11 NOTE — TELEPHONE ENCOUNTER
Pt needs a refill sent to local pharmacy due to mail service not being on time won't even be mailed out until the 18th and needs a small supply of  metFORMIN (GLUCOPHAGE) 1000 MG tablet [[id number]]    Pharm- [id number] - [place], [place] - [address] - P [phone] - F [phone]      Please call and advise

## 2024-01-17 DIAGNOSIS — E11.9 TYPE 2 DIABETES MELLITUS WITHOUT COMPLICATION, WITHOUT LONG-TERM CURRENT USE OF INSULIN (HCC): ICD-10-CM

## 2024-01-18 DIAGNOSIS — E11.9 TYPE 2 DIABETES MELLITUS WITHOUT COMPLICATION, WITHOUT LONG-TERM CURRENT USE OF INSULIN (HCC): ICD-10-CM

## 2024-01-18 NOTE — TELEPHONE ENCOUNTER
It looks like metformin was requested 3 times.    metFORMIN (GLUCOPHAGE) 1000 MG tablet     Patients wife requested a refill from [facility] and they sent it out today.    We also received refill request(s).  She is asking if we can send a partial script to cover for the next 5 - 7 days until the home delivery meds are received.    He only has enough meds for today!!    Please notify pharmacy of the partial request as some  of the refills requests came from the [facility] pharmacy.    Please call if you have any questions.    [id number] - [place], [place] - [address] - [place] [phone] - F [phone]

## 2024-02-01 DIAGNOSIS — I10 ESSENTIAL HYPERTENSION: ICD-10-CM

## 2024-02-02 RX ORDER — LOSARTAN POTASSIUM 50 MG/1
50 TABLET ORAL DAILY
Qty: 90 TABLET | Refills: 1 | Status: SHIPPED | OUTPATIENT
Start: 2024-02-02

## 2024-05-23 ENCOUNTER — OFFICE VISIT (OUTPATIENT)
Dept: CARDIOLOGY CLINIC | Age: 79
End: 2024-05-23
Payer: MEDICARE

## 2024-05-23 VITALS
HEART RATE: 72 BPM | BODY MASS INDEX: 28.07 KG/M2 | SYSTOLIC BLOOD PRESSURE: 140 MMHG | DIASTOLIC BLOOD PRESSURE: 70 MMHG | WEIGHT: 207 LBS

## 2024-05-23 DIAGNOSIS — I25.2 MI, OLD: ICD-10-CM

## 2024-05-23 DIAGNOSIS — I10 ESSENTIAL HYPERTENSION: ICD-10-CM

## 2024-05-23 DIAGNOSIS — I25.10 CAD IN NATIVE ARTERY: Primary | ICD-10-CM

## 2024-05-23 PROCEDURE — 3077F SYST BP >= 140 MM HG: CPT | Performed by: INTERNAL MEDICINE

## 2024-05-23 PROCEDURE — 99214 OFFICE O/P EST MOD 30 MIN: CPT | Performed by: INTERNAL MEDICINE

## 2024-05-23 PROCEDURE — G8417 CALC BMI ABV UP PARAM F/U: HCPCS | Performed by: INTERNAL MEDICINE

## 2024-05-23 PROCEDURE — 1036F TOBACCO NON-USER: CPT | Performed by: INTERNAL MEDICINE

## 2024-05-23 PROCEDURE — 3078F DIAST BP <80 MM HG: CPT | Performed by: INTERNAL MEDICINE

## 2024-05-23 PROCEDURE — 1123F ACP DISCUSS/DSCN MKR DOCD: CPT | Performed by: INTERNAL MEDICINE

## 2024-05-23 PROCEDURE — G8427 DOCREV CUR MEDS BY ELIG CLIN: HCPCS | Performed by: INTERNAL MEDICINE

## 2024-05-23 NOTE — PROGRESS NOTES
Subjective:      Patient ID: Kisha Brandon is a 79 y.o. male.    RODERICK Beard is here today for follow up CAD/HTN/MI.  Continues to walk 5x per wk.  Very active.   No angina. No sob. No pnd or orthopnea.  No edema.  Wt stable.   No tachy/syncope.  BP good at home.  Feeling good       Past Medical History:   Diagnosis Date    CAD (coronary artery disease)     Diabetes mellitus ([facility]CC)     Hyperlipidemia     Hypertension     Lower GI bleed     Myocardial infarction ([facility])     Sensorineural hearing loss of right ear     Shingles      Past Surgical History:   Procedure Laterality Date    COLONOSCOPY      COLONOSCOPY N/A 5/2/2023    COLONOSCOPY POLYPECTOMY SNARE/COLD BIOPSY performed by Brad Carvalho MD at [facility] ENDOSCOPY    CORONARY ANGIOPLASTY WITH STENT PLACEMENT      ERCP N/A 02/09/2023    ERCP SPHINCTER/PAPILLOTOMY performed by Giancarlo Kimbrough MD at [facility]    ERCP N/A 02/09/2023    ERCP STONE REMOVAL performed by Giancarlo Kimbrough MD at [facility] END[facility]    UPPER GASTROINTESTINAL ENDOSCOPY      received clipping for ania rice tear       Social History     Socioeconomic History    Marital status:      Spouse name: Not on file    Number of children: 2    Years of education: Not on file    Highest education level: Not on file   Occupational History    Occupation:      Comment: retired   Tobacco Use    Smoking status: Never     Passive exposure: Never    Smokeless tobacco: Former     Types: Chew   Substance and Sexual Activity    Alcohol use: No    Drug use: No    Sexual activity: Not on file   Other Topics Concern    Not on file   Social History Narrative    Not on file     Social Determinants of Health     Financial Resource Strain: Low Risk  (2/23/2023)    Overall Financial Resource Strain (CARDIA)     Difficulty of Paying Living Expenses: Not hard at all   Food Insecurity: Not on file (2/23/2023)   Transportation Needs: Unknown (2/23/2023)    PRAPARE - Transportation     Lack of Transportation

## 2024-06-26 DIAGNOSIS — E11.9 TYPE 2 DIABETES MELLITUS WITHOUT COMPLICATION, WITHOUT LONG-TERM CURRENT USE OF INSULIN (HCC): ICD-10-CM

## 2024-07-31 DIAGNOSIS — I10 ESSENTIAL HYPERTENSION: ICD-10-CM

## 2024-07-31 RX ORDER — LOSARTAN POTASSIUM 50 MG/1
50 TABLET ORAL DAILY
Qty: 90 TABLET | Refills: 1 | Status: SHIPPED | OUTPATIENT
Start: 2024-07-31

## 2024-11-25 RX ORDER — METOPROLOL TARTRATE 25 MG/1
TABLET, FILM COATED ORAL
Qty: 90 TABLET | Refills: 3 | Status: SHIPPED | OUTPATIENT
Start: 2024-11-25

## 2024-11-25 RX ORDER — ATORVASTATIN CALCIUM 80 MG/1
TABLET, FILM COATED ORAL
Qty: 90 TABLET | Refills: 3 | Status: SHIPPED | OUTPATIENT
Start: 2024-11-25

## 2024-11-25 NOTE — TELEPHONE ENCOUNTER
Requested Prescriptions     Pending Prescriptions Disp Refills    atorvastatin (LIPITOR) 80 MG tablet [Pharmacy Med Name: Atorvastatin Calcium Oral Tablet 80 MG] 90 tablet 3     Sig: TAKE 1 TABLET EVERY DAY    metoprolol tartrate (LOPRESSOR) 25 MG tablet [Pharmacy Med Name: Metoprolol Tartrate Oral Tablet 25 MG] 90 tablet 3     Sig: TAKE 1/2 TABLET TWICE DAILY            Checked Correct Pharmacy: Yes    Any changes since last refill? No         Last Office Visit: 05.23.2024    Next Office Visit: 03.04.2025      Last Labs: 09.30.2023

## 2025-01-23 DIAGNOSIS — I10 ESSENTIAL HYPERTENSION: ICD-10-CM

## 2025-01-23 RX ORDER — LOSARTAN POTASSIUM 50 MG/1
50 TABLET ORAL DAILY
Qty: 90 TABLET | Refills: 1 | Status: SHIPPED | OUTPATIENT
Start: 2025-01-23

## 2025-03-19 ENCOUNTER — OFFICE VISIT (OUTPATIENT)
Dept: CARDIOLOGY CLINIC | Age: 80
End: 2025-03-19
Payer: MEDICARE

## 2025-03-19 VITALS
BODY MASS INDEX: 27.56 KG/M2 | DIASTOLIC BLOOD PRESSURE: 60 MMHG | SYSTOLIC BLOOD PRESSURE: 130 MMHG | HEART RATE: 70 BPM | WEIGHT: 203.2 LBS

## 2025-03-19 DIAGNOSIS — I25.10 ATHEROSCLEROSIS OF NATIVE CORONARY ARTERY OF NATIVE HEART WITHOUT ANGINA PECTORIS: Primary | ICD-10-CM

## 2025-03-19 PROCEDURE — 3078F DIAST BP <80 MM HG: CPT | Performed by: INTERNAL MEDICINE

## 2025-03-19 PROCEDURE — 1036F TOBACCO NON-USER: CPT | Performed by: INTERNAL MEDICINE

## 2025-03-19 PROCEDURE — G8427 DOCREV CUR MEDS BY ELIG CLIN: HCPCS | Performed by: INTERNAL MEDICINE

## 2025-03-19 PROCEDURE — G2211 COMPLEX E/M VISIT ADD ON: HCPCS | Performed by: INTERNAL MEDICINE

## 2025-03-19 PROCEDURE — 1123F ACP DISCUSS/DSCN MKR DOCD: CPT | Performed by: INTERNAL MEDICINE

## 2025-03-19 PROCEDURE — G8417 CALC BMI ABV UP PARAM F/U: HCPCS | Performed by: INTERNAL MEDICINE

## 2025-03-19 PROCEDURE — 1159F MED LIST DOCD IN RCRD: CPT | Performed by: INTERNAL MEDICINE

## 2025-03-19 PROCEDURE — 3075F SYST BP GE 130 - 139MM HG: CPT | Performed by: INTERNAL MEDICINE

## 2025-03-19 PROCEDURE — 99215 OFFICE O/P EST HI 40 MIN: CPT | Performed by: INTERNAL MEDICINE

## 2025-03-19 RX ORDER — CARBIDOPA AND LEVODOPA 25; 100 MG/1; MG/1
TABLET ORAL
COMMUNITY
Start: 2025-03-11

## 2025-03-19 RX ORDER — METOPROLOL SUCCINATE 25 MG/1
25 TABLET, EXTENDED RELEASE ORAL DAILY
Qty: 90 TABLET | Refills: 3 | Status: SHIPPED | OUTPATIENT
Start: 2025-03-19

## 2025-03-19 NOTE — PROGRESS NOTES
Cc: CAD s/p MI and stent    HPI:     Patient is a 79-year-old man with history of HTN, HLP, DM, right-sided neurosensory hearing loss, CAD status post MI and stent x 1 in the early 2000's.    Nuclear stress 11/2014: Inferior lateral scar with hypokinesis, no ischemia, LVEF 45%.    Echo 06/2015: Normal    Lipid profile 08/2023 , HDL 46, LDL 79,  on Lipitor 80 daily.    Patient returns to the clinic today for a follow-up with his wife Manasa.  He reports increased urination at night (gets up to use the bathroom 4-5 times per night).  Otherwise he denies any ischemic or congestive symptoms.      Histories     Past Medical History:   has a past medical history of CAD (coronary artery disease), Diabetes mellitus (HCC), Hyperlipidemia, Hypertension, Lower GI bleed, Myocardial infarction (HCC), Sensorineural hearing loss of right ear, and Shingles.    Surgical History:   has a past surgical history that includes Coronary angioplasty with stent; Upper gastrointestinal endoscopy; ERCP (N/A, 02/09/2023); ERCP (N/A, 02/09/2023); Colonoscopy; and Colonoscopy (N/A, 5/2/2023).     Social History:   reports that he has never smoked. He has never been exposed to tobacco smoke. He has quit using smokeless tobacco.  His smokeless tobacco use included chew. He reports that he does not drink alcohol and does not use drugs.     Family History:  No evidence for sudden cardiac death or premature CAD      Medications:     Home medications were reviewed and are listed below    Prior to Admission medications    Medication Sig Start Date End Date Taking? Authorizing Provider   carbidopa-levodopa (SINEMET)  MG per tablet PLEASE SEE ATTACHED FOR DETAILED DIRECTIONS 3/11/25  Yes Provider, MD Corrine   metoprolol succinate (TOPROL XL) 25 MG extended release tablet Take 1 tablet by mouth daily 3/19/25  Yes Narayan He MD   losartan (COZAAR) 50 MG tablet TAKE 1 TABLET BY MOUTH EVERY DAY 1/23/25  Yes Tor Rodarte MD

## 2025-03-25 ENCOUNTER — TELEPHONE (OUTPATIENT)
Dept: CARDIOLOGY CLINIC | Age: 80
End: 2025-03-25

## 2025-03-25 NOTE — TELEPHONE ENCOUNTER
Per Chart review patient has been on Lopressor 12.5 mg BID which is equivalent to Toprol XL 25 mg daily. One is short acting and the other is long acting. He should not have any issues with switching to Toprol if he didn't have any issues with Lopressor. But if she is concerned, she can start Toprol XL 2 weeks after starting Carbidopa-levodopa.

## 2025-03-25 NOTE — TELEPHONE ENCOUNTER
Pts wife called to ask about holding off on starting the new metoprolol dr cervantes prescibed. She is worried cause her  got a new medication  carbidopa-levodopa 25/100 TID . She Is worried about starting two medications at same time. She would like advice on how and when to start.     Please advise in  absences

## 2025-06-12 DIAGNOSIS — E11.9 TYPE 2 DIABETES MELLITUS WITHOUT COMPLICATION, WITHOUT LONG-TERM CURRENT USE OF INSULIN (HCC): ICD-10-CM

## 2025-06-12 NOTE — TELEPHONE ENCOUNTER
Last appointment: 10/31/2023  Next appointment: Visit date not found        Last refill: (6/26/2024) by Tor Rodarte MD

## 2025-07-13 DIAGNOSIS — I10 ESSENTIAL HYPERTENSION: ICD-10-CM

## 2025-07-14 RX ORDER — LOSARTAN POTASSIUM 50 MG/1
50 TABLET ORAL DAILY
Qty: 30 TABLET | Refills: 0 | Status: SHIPPED | OUTPATIENT
Start: 2025-07-14

## 2025-07-14 NOTE — TELEPHONE ENCOUNTER
Last appointment: 10/31/2023  Next appointment: Visit date not found        Last refill: (1/23/2025) by Tor Rodarte MD

## 2025-07-24 DIAGNOSIS — E11.9 TYPE 2 DIABETES MELLITUS WITHOUT COMPLICATION, WITHOUT LONG-TERM CURRENT USE OF INSULIN (HCC): ICD-10-CM

## 2025-07-24 NOTE — TELEPHONE ENCOUNTER
Last appointment: 10/31/2023  Next appointment: Visit date not found        Last refill:   (6/13/2025) by Tor Rodarte MD

## 2025-08-18 DIAGNOSIS — E11.9 TYPE 2 DIABETES MELLITUS WITHOUT COMPLICATION, WITHOUT LONG-TERM CURRENT USE OF INSULIN (HCC): ICD-10-CM

## 2025-08-18 SDOH — ECONOMIC STABILITY: INCOME INSECURITY: IN THE LAST 12 MONTHS, WAS THERE A TIME WHEN YOU WERE NOT ABLE TO PAY THE MORTGAGE OR RENT ON TIME?: NO

## 2025-08-18 SDOH — ECONOMIC STABILITY: FOOD INSECURITY: WITHIN THE PAST 12 MONTHS, THE FOOD YOU BOUGHT JUST DIDN'T LAST AND YOU DIDN'T HAVE MONEY TO GET MORE.: NEVER TRUE

## 2025-08-18 SDOH — ECONOMIC STABILITY: TRANSPORTATION INSECURITY
IN THE PAST 12 MONTHS, HAS THE LACK OF TRANSPORTATION KEPT YOU FROM MEDICAL APPOINTMENTS OR FROM GETTING MEDICATIONS?: NO

## 2025-08-18 SDOH — ECONOMIC STABILITY: FOOD INSECURITY: WITHIN THE PAST 12 MONTHS, YOU WORRIED THAT YOUR FOOD WOULD RUN OUT BEFORE YOU GOT MONEY TO BUY MORE.: NEVER TRUE

## 2025-08-18 SDOH — ECONOMIC STABILITY: TRANSPORTATION INSECURITY
IN THE PAST 12 MONTHS, HAS LACK OF TRANSPORTATION KEPT YOU FROM MEETINGS, WORK, OR FROM GETTING THINGS NEEDED FOR DAILY LIVING?: NO

## 2025-08-19 ENCOUNTER — OFFICE VISIT (OUTPATIENT)
Dept: INTERNAL MEDICINE CLINIC | Age: 80
End: 2025-08-19
Payer: MEDICARE

## 2025-08-19 VITALS
DIASTOLIC BLOOD PRESSURE: 70 MMHG | SYSTOLIC BLOOD PRESSURE: 128 MMHG | HEIGHT: 72 IN | WEIGHT: 190 LBS | BODY MASS INDEX: 25.73 KG/M2

## 2025-08-19 DIAGNOSIS — R68.89 FORGETFULNESS: ICD-10-CM

## 2025-08-19 DIAGNOSIS — R35.0 URINARY FREQUENCY: ICD-10-CM

## 2025-08-19 DIAGNOSIS — I25.10 CORONARY ARTERY DISEASE DUE TO LIPID RICH PLAQUE: ICD-10-CM

## 2025-08-19 DIAGNOSIS — B35.1 ONYCHOMYCOSIS OF TOENAIL: ICD-10-CM

## 2025-08-19 DIAGNOSIS — I25.83 CORONARY ARTERY DISEASE DUE TO LIPID RICH PLAQUE: ICD-10-CM

## 2025-08-19 DIAGNOSIS — E11.9 TYPE 2 DIABETES MELLITUS WITHOUT COMPLICATION, WITHOUT LONG-TERM CURRENT USE OF INSULIN (HCC): ICD-10-CM

## 2025-08-19 DIAGNOSIS — R26.81 UNSTEADY GAIT: ICD-10-CM

## 2025-08-19 DIAGNOSIS — I10 ESSENTIAL HYPERTENSION: ICD-10-CM

## 2025-08-19 DIAGNOSIS — Z00.00 MEDICARE ANNUAL WELLNESS VISIT, SUBSEQUENT: Primary | ICD-10-CM

## 2025-08-19 PROCEDURE — 3074F SYST BP LT 130 MM HG: CPT | Performed by: HOSPITALIST

## 2025-08-19 PROCEDURE — 1123F ACP DISCUSS/DSCN MKR DOCD: CPT | Performed by: HOSPITALIST

## 2025-08-19 PROCEDURE — 3078F DIAST BP <80 MM HG: CPT | Performed by: HOSPITALIST

## 2025-08-19 PROCEDURE — G0439 PPPS, SUBSEQ VISIT: HCPCS | Performed by: HOSPITALIST

## 2025-08-19 PROCEDURE — 1159F MED LIST DOCD IN RCRD: CPT | Performed by: HOSPITALIST

## 2025-08-19 RX ORDER — LOSARTAN POTASSIUM 50 MG/1
50 TABLET ORAL DAILY
Qty: 90 TABLET | Refills: 1 | Status: SHIPPED | OUTPATIENT
Start: 2025-08-19

## 2025-08-19 RX ORDER — PHENOL 1.4 %
AEROSOL, SPRAY (ML) MUCOUS MEMBRANE
COMMUNITY

## 2025-08-19 RX ORDER — NITROGLYCERIN 0.4 MG/1
0.4 TABLET SUBLINGUAL EVERY 5 MIN PRN
Qty: 25 TABLET | Refills: 5 | Status: SHIPPED | OUTPATIENT
Start: 2025-08-19

## 2025-08-19 SDOH — HEALTH STABILITY: PHYSICAL HEALTH: ON AVERAGE, HOW MANY DAYS PER WEEK DO YOU ENGAGE IN MODERATE TO STRENUOUS EXERCISE (LIKE A BRISK WALK)?: 0 DAYS

## 2025-08-19 SDOH — ECONOMIC STABILITY: FOOD INSECURITY: WITHIN THE PAST 12 MONTHS, YOU WORRIED THAT YOUR FOOD WOULD RUN OUT BEFORE YOU GOT MONEY TO BUY MORE.: NEVER TRUE

## 2025-08-19 SDOH — ECONOMIC STABILITY: FOOD INSECURITY: WITHIN THE PAST 12 MONTHS, THE FOOD YOU BOUGHT JUST DIDN'T LAST AND YOU DIDN'T HAVE MONEY TO GET MORE.: NEVER TRUE

## 2025-08-19 SDOH — ECONOMIC STABILITY: INCOME INSECURITY: IN THE LAST 12 MONTHS, WAS THERE A TIME WHEN YOU WERE NOT ABLE TO PAY THE MORTGAGE OR RENT ON TIME?: NO

## 2025-08-19 ASSESSMENT — PATIENT HEALTH QUESTIONNAIRE - PHQ9
SUM OF ALL RESPONSES TO PHQ QUESTIONS 1-9: 6
SUM OF ALL RESPONSES TO PHQ QUESTIONS 1-9: 6
8. MOVING OR SPEAKING SO SLOWLY THAT OTHER PEOPLE COULD HAVE NOTICED. OR THE OPPOSITE - BEING SO FIDGETY OR RESTLESS THAT YOU HAVE BEEN MOVING AROUND A LOT MORE THAN USUAL: NOT AT ALL
7. TROUBLE CONCENTRATING ON THINGS, SUCH AS READING THE NEWSPAPER OR WATCHING TELEVISION: NOT AT ALL
3. TROUBLE FALLING OR STAYING ASLEEP: SEVERAL DAYS
6. FEELING BAD ABOUT YOURSELF - OR THAT YOU ARE A FAILURE OR HAVE LET YOURSELF OR YOUR FAMILY DOWN: NOT AT ALL
3. TROUBLE FALLING OR STAYING ASLEEP: SEVERAL DAYS
10. IF YOU CHECKED OFF ANY PROBLEMS, HOW DIFFICULT HAVE THESE PROBLEMS MADE IT FOR YOU TO DO YOUR WORK, TAKE CARE OF THINGS AT HOME, OR GET ALONG WITH OTHER PEOPLE: SOMEWHAT DIFFICULT
9. THOUGHTS THAT YOU WOULD BE BETTER OFF DEAD, OR OF HURTING YOURSELF: NOT AT ALL
9. THOUGHTS THAT YOU WOULD BE BETTER OFF DEAD, OR OF HURTING YOURSELF: NOT AT ALL
4. FEELING TIRED OR HAVING LITTLE ENERGY: SEVERAL DAYS
SUM OF ALL RESPONSES TO PHQ QUESTIONS 1-9: 6
5. POOR APPETITE OR OVEREATING: NOT AT ALL
1. LITTLE INTEREST OR PLEASURE IN DOING THINGS: MORE THAN HALF THE DAYS
SUM OF ALL RESPONSES TO PHQ QUESTIONS 1-9: 6
10. IF YOU CHECKED OFF ANY PROBLEMS, HOW DIFFICULT HAVE THESE PROBLEMS MADE IT FOR YOU TO DO YOUR WORK, TAKE CARE OF THINGS AT HOME, OR GET ALONG WITH OTHER PEOPLE: SOMEWHAT DIFFICULT
SUM OF ALL RESPONSES TO PHQ QUESTIONS 1-9: 6
6. FEELING BAD ABOUT YOURSELF - OR THAT YOU ARE A FAILURE OR HAVE LET YOURSELF OR YOUR FAMILY DOWN: NOT AT ALL
2. FEELING DOWN, DEPRESSED OR HOPELESS: MORE THAN HALF THE DAYS
SUM OF ALL RESPONSES TO PHQ9 QUESTIONS 1 & 2: 4
1. LITTLE INTEREST OR PLEASURE IN DOING THINGS: MORE THAN HALF THE DAYS
4. FEELING TIRED OR HAVING LITTLE ENERGY: SEVERAL DAYS
2. FEELING DOWN, DEPRESSED OR HOPELESS: MORE THAN HALF THE DAYS
5. POOR APPETITE OR OVEREATING: NOT AT ALL
7. TROUBLE CONCENTRATING ON THINGS, SUCH AS READING THE NEWSPAPER OR WATCHING TELEVISION: NOT AT ALL
8. MOVING OR SPEAKING SO SLOWLY THAT OTHER PEOPLE COULD HAVE NOTICED. OR THE OPPOSITE, BEING SO FIGETY OR RESTLESS THAT YOU HAVE BEEN MOVING AROUND A LOT MORE THAN USUAL: NOT AT ALL

## 2025-08-19 ASSESSMENT — LIFESTYLE VARIABLES
HOW OFTEN DO YOU HAVE SIX OR MORE DRINKS ON ONE OCCASION: 1
HOW MANY STANDARD DRINKS CONTAINING ALCOHOL DO YOU HAVE ON A TYPICAL DAY: PATIENT DOES NOT DRINK
HOW OFTEN DO YOU HAVE A DRINK CONTAINING ALCOHOL: NEVER
HOW MANY STANDARD DRINKS CONTAINING ALCOHOL DO YOU HAVE ON A TYPICAL DAY: 0
HOW OFTEN DO YOU HAVE A DRINK CONTAINING ALCOHOL: 1

## 2025-08-26 DIAGNOSIS — E11.9 TYPE 2 DIABETES MELLITUS WITHOUT COMPLICATION, WITHOUT LONG-TERM CURRENT USE OF INSULIN (HCC): ICD-10-CM

## 2025-08-26 DIAGNOSIS — I10 ESSENTIAL HYPERTENSION: ICD-10-CM

## 2025-08-27 LAB
ALBUMIN SERPL-MCNC: 4.3 G/DL (ref 3.4–5)
ALBUMIN/GLOB SERPL: 2.3 {RATIO} (ref 1.1–2.2)
ALP SERPL-CCNC: 115 U/L (ref 40–129)
ALT SERPL-CCNC: 30 U/L (ref 10–40)
ANION GAP SERPL CALCULATED.3IONS-SCNC: 10 MMOL/L (ref 3–16)
AST SERPL-CCNC: 21 U/L (ref 15–37)
BASOPHILS # BLD: 0 K/UL (ref 0–0.2)
BASOPHILS NFR BLD: 0.7 %
BILIRUB SERPL-MCNC: 0.5 MG/DL (ref 0–1)
BUN SERPL-MCNC: 14 MG/DL (ref 7–20)
CALCIUM SERPL-MCNC: 9.3 MG/DL (ref 8.3–10.6)
CHLORIDE SERPL-SCNC: 99 MMOL/L (ref 99–110)
CHOLEST SERPL-MCNC: 168 MG/DL (ref 0–199)
CO2 SERPL-SCNC: 28 MMOL/L (ref 21–32)
CREAT SERPL-MCNC: 0.8 MG/DL (ref 0.8–1.3)
CREAT UR-MCNC: 89.4 MG/DL (ref 39–259)
DEPRECATED RDW RBC AUTO: 14 % (ref 12.4–15.4)
EOSINOPHIL # BLD: 0 K/UL (ref 0–0.6)
EOSINOPHIL NFR BLD: 1 %
EST. AVERAGE GLUCOSE BLD GHB EST-MCNC: 137 MG/DL
GFR SERPLBLD CREATININE-BSD FMLA CKD-EPI: 89 ML/MIN/{1.73_M2}
GLUCOSE SERPL-MCNC: 118 MG/DL (ref 70–99)
HBA1C MFR BLD: 6.4 %
HCT VFR BLD AUTO: 37.7 % (ref 40.5–52.5)
HDLC SERPL-MCNC: 39 MG/DL (ref 40–60)
HGB BLD-MCNC: 13 G/DL (ref 13.5–17.5)
LDLC SERPL CALC-MCNC: ABNORMAL MG/DL
LDLC SERPL-MCNC: 77 MG/DL
LYMPHOCYTES # BLD: 1.9 K/UL (ref 1–5.1)
LYMPHOCYTES NFR BLD: 39.8 %
MCH RBC QN AUTO: 31.2 PG (ref 26–34)
MCHC RBC AUTO-ENTMCNC: 34.6 G/DL (ref 31–36)
MCV RBC AUTO: 90 FL (ref 80–100)
MICROALBUMIN UR DL<=1MG/L-MCNC: <1.2 MG/DL
MICROALBUMIN/CREAT UR: NORMAL MG/G (ref 0–30)
MONOCYTES # BLD: 0.4 K/UL (ref 0–1.3)
MONOCYTES NFR BLD: 7.8 %
NEUTROPHILS # BLD: 2.4 K/UL (ref 1.7–7.7)
NEUTROPHILS NFR BLD: 50.7 %
PLATELET # BLD AUTO: 227 K/UL (ref 135–450)
PMV BLD AUTO: 9.4 FL (ref 5–10.5)
POTASSIUM SERPL-SCNC: 4.7 MMOL/L (ref 3.5–5.1)
PROT SERPL-MCNC: 6.2 G/DL (ref 6.4–8.2)
RBC # BLD AUTO: 4.19 M/UL (ref 4.2–5.9)
SODIUM SERPL-SCNC: 137 MMOL/L (ref 136–145)
TRIGL SERPL-MCNC: 325 MG/DL (ref 0–150)
TSH SERPL DL<=0.005 MIU/L-ACNC: 3.94 UIU/ML (ref 0.27–4.2)
VLDLC SERPL CALC-MCNC: ABNORMAL MG/DL
WBC # BLD AUTO: 4.7 K/UL (ref 4–11)

## 2025-09-02 ENCOUNTER — HOSPITAL ENCOUNTER (OUTPATIENT)
Dept: PHYSICAL THERAPY | Age: 80
Setting detail: THERAPIES SERIES
Discharge: HOME | End: 2025-09-02
Attending: HOSPITALIST
Payer: MEDICARE

## 2025-09-02 PROCEDURE — 97116 GAIT TRAINING THERAPY: CPT | Performed by: PHYSICAL THERAPIST

## 2025-09-02 PROCEDURE — 97530 THERAPEUTIC ACTIVITIES: CPT | Performed by: PHYSICAL THERAPIST

## 2025-09-02 PROCEDURE — 97162 PT EVAL MOD COMPLEX 30 MIN: CPT | Performed by: PHYSICAL THERAPIST

## 2025-09-04 ENCOUNTER — HOSPITAL ENCOUNTER (OUTPATIENT)
Dept: PHYSICAL THERAPY | Age: 80
Setting detail: THERAPIES SERIES
Discharge: HOME | End: 2025-09-04
Attending: HOSPITALIST
Payer: MEDICARE

## 2025-09-04 PROCEDURE — 97112 NEUROMUSCULAR REEDUCATION: CPT | Performed by: PHYSICAL THERAPIST

## 2025-09-04 PROCEDURE — 97110 THERAPEUTIC EXERCISES: CPT | Performed by: PHYSICAL THERAPIST

## 2025-09-04 PROCEDURE — 97116 GAIT TRAINING THERAPY: CPT | Performed by: PHYSICAL THERAPIST

## (undated) DEVICE — TROCAR: Brand: KII FIOS FIRST ENTRY

## (undated) DEVICE — SYRINGE MED 10ML LUERLOCK TIP W/O SFTY DISP

## (undated) DEVICE — CATHETER URET 4FR L70CM POLYUR WHSTL FLX TIP KINK RESIST W/

## (undated) DEVICE — SNARES COLD OVAL 10MM THIN

## (undated) DEVICE — SUTURE MCRYL SZ 4-0 L18IN ABSRB UD L19MM PS-2 3/8 CIR PRIM Y496G

## (undated) DEVICE — ERBE NESSY®PLATE 170 SPLIT; 168CM²; CABLE 3M: Brand: ERBE

## (undated) DEVICE — TOWEL,STOP FLAG GOLD N-W: Brand: MEDLINE

## (undated) DEVICE — APPLICATOR PREP 26ML 0.7% IOD POVACRYLEX 74% ISO ALC ST

## (undated) DEVICE — COVER,LIGHT HANDLE,FLX,1/PK: Brand: MEDLINE INDUSTRIES, INC.

## (undated) DEVICE — SCISSORS ENDOSCP DIA5MM CRV MPLR CAUT W/ RATCH HNDL

## (undated) DEVICE — TISSUE RETRIEVAL SYSTEM: Brand: INZII RETRIEVAL SYSTEM

## (undated) DEVICE — SUTURE VCRL SZ 0 L27IN ABSRB UD L26MM CT-2 1/2 CIR J270H

## (undated) DEVICE — CORD ES L10FT MPLR LAP

## (undated) DEVICE — SET EXTN PRIMING 4.9ML L30IN INCL SLDE CLMP SPIN M LUERLOCK

## (undated) DEVICE — CANNULA SAMP CO2 AD GRN 7FT CO2 AND 7FT O2 TBNG UNIV CONN

## (undated) DEVICE — GENERAL LAPAROSCOPIC: Brand: MEDLINE INDUSTRIES, INC.

## (undated) DEVICE — CANNULATING SPHINCTEROTOME: Brand: TRUETOME DREAMWIRE 44

## (undated) DEVICE — TROCAR: Brand: KII SHIELDED BLADED ACCESS SYSTEM

## (undated) DEVICE — BLADE ES ELASTOMERIC COAT INSUL DURABLE BEND UPTO 90DEG

## (undated) DEVICE — MEMORY II DOUBLE LUMEN EXTRACTION BASKET: Brand: MEMORY BASKET

## (undated) DEVICE — GLOVE SURG SZ 7 L12IN FNGR THK75MIL WHT LTX POLYMER BEAD

## (undated) DEVICE — C-ARM: Brand: UNBRANDED

## (undated) DEVICE — SOLUTION INJ LR VISIV 1000ML BG

## (undated) DEVICE — KIT,ANTI FOG,W/SPONGE & FLUID,SOFT PACK: Brand: MEDLINE

## (undated) DEVICE — PUMP SUC IRR TBNG L10FT W/ HNDPC ASSEMB STRYKEFLOW 2

## (undated) DEVICE — TROCARS: Brand: KII® BALLOON BLUNT TIP SYSTEM

## (undated) DEVICE — TRAP SPEC RETRV CLR PLAS POLYP IN LN SUCT QUIK CTCH

## (undated) DEVICE — Z DISCONTINUED NEEDLE HYPO 22GA L1.5IN BLK POLYPR HUB S STL REG BVL STR - SEE COMMENT

## (undated) DEVICE — LARGE BORE STOPCOCK WITH ROTATING MALE LUER LOCK

## (undated) DEVICE — 40583 XL ADVANCED TRENDELENBURG POSITIONING KIT: Brand: 40583 XL ADVANCED TRENDELENBURG POSITIONING KIT